# Patient Record
Sex: FEMALE | Race: WHITE | NOT HISPANIC OR LATINO | Employment: FULL TIME | URBAN - METROPOLITAN AREA
[De-identification: names, ages, dates, MRNs, and addresses within clinical notes are randomized per-mention and may not be internally consistent; named-entity substitution may affect disease eponyms.]

---

## 2017-01-01 ENCOUNTER — TELEPHONE (OUTPATIENT)
Dept: NURSING | Facility: CLINIC | Age: 37
End: 2017-01-01

## 2017-01-02 NOTE — TELEPHONE ENCOUNTER
"Call Type: Triage Call    Presenting Problem: 'I have taken the last lopez of a medrol dose pack  for back pain and today I just felt very anxious and my heart was  pounding.  Can I expect this to go on?\"  Advised that  steroid of  ten cause  increase in previous anxiety, mood diaorders per  [employer];  advised if symptoms persist see PCP; call if any new or  worsening symptoms.  Triage Note:  Guideline Title: Anxiety: Panic  Recommended Disposition: See Provider within 2 Weeks  Original Inclination: Would have called clinic  Override Disposition:  Intended Action: Follow Selfcare / Homecare  Physician Contacted: No  All other situations ?  YES  Unable to stand due to faintness, dizziness, or lightheadedness ? NO  History of panic attacks AND current episode NOT resolved ? NO  Experiencing multiple daily episodes of severe anxiety or panic ? NO  Panic attack lasts longer than 30 minutes ? NO  Frequent episodes of panic (several times per week) ? NO  Anxiety symptoms OR panic episodes increasing in frequency or length ? NO  Frequent or longer crying spells and/or despondent about symptoms ? NO  Symptoms associated with behavioral rituals ? NO  Occasional episodes of unexplained panic ? NO  Limited or no support system available ? NO  Describing symptoms of depression ? NO  Recent or new change in symptom pattern ? NO  Discomfort being in public ? NO  Disturbance in sleep patterns ? NO  Severe breathing problems ? NO  Unable to care for self or others or perform activities of daily living (ADLs) ? NO  Hearing voices that no one else hears or seeing things that no one else sees ? NO  Recurring or worsening symptoms AND history of psychiatric illness ? NO  Known or suspected current alcohol or drug use ? NO  Recent loss (through death, divorce, relocation, job loss or change, financial  issues, change in health status, etc.) ? NO  New or increasing symptoms AND not currently in treatment; not taking  medications/therapy; " or change in medication or therapy ? NO  New increase or worsening of ideas or beliefs that most other people find strange  ? NO  Dizziness, faintness, or lightheadedness lasting more than 15 minutes ? NO  Previously evaluated and worsening symptoms interfering with ability to carry out  activities of daily living (ADLs) ? NO  New or worsening confusion, disorientation, or agitation ? NO  Any other cardiac signs/symptoms for more than 5 minutes, now or within last hour.  Pain is NOT associated with taking a deep breath or a productive cough, movement,  or touch to a localized area on the chest or upper body. ? NO  Suspicious without justification; has doubts about loyalty or trust, often avoids  responsibility and blames others ? NO  Any homicidal/destructive ideation, any suicidal ideation, any history of suicide  attempts, and/or any history of self destructive behavior ? NO  Frequent periods of apprehension or excessive worry that are difficult to control  ? NO  Unintentional weight loss of 5% or more of usual body weight in less than 1 month  with or without other signs and symptoms ? NO  New or increasing symptoms AND taking medications/following therapy as prescribed  ? NO  Any suicidal or homicidal attempt(s) or gesture(s) in progress. ? NO  Physician Instructions:  Care Advice: Call provider immediately if behavior becomes a threat to self  or others.

## 2017-01-04 ENCOUNTER — THERAPY VISIT (OUTPATIENT)
Dept: PHYSICAL THERAPY | Facility: CLINIC | Age: 37
End: 2017-01-04
Payer: COMMERCIAL

## 2017-01-04 DIAGNOSIS — G89.29 CHRONIC MIDLINE LOW BACK PAIN WITHOUT SCIATICA: Primary | ICD-10-CM

## 2017-01-04 DIAGNOSIS — M54.50 CHRONIC MIDLINE LOW BACK PAIN WITHOUT SCIATICA: Primary | ICD-10-CM

## 2017-01-04 PROCEDURE — 97110 THERAPEUTIC EXERCISES: CPT | Mod: GP | Performed by: PHYSICAL THERAPIST

## 2017-01-04 PROCEDURE — 97140 MANUAL THERAPY 1/> REGIONS: CPT | Mod: GP | Performed by: PHYSICAL THERAPIST

## 2017-01-18 ENCOUNTER — THERAPY VISIT (OUTPATIENT)
Dept: PHYSICAL THERAPY | Facility: CLINIC | Age: 37
End: 2017-01-18
Payer: COMMERCIAL

## 2017-01-18 DIAGNOSIS — M54.50 CHRONIC MIDLINE LOW BACK PAIN WITHOUT SCIATICA: Primary | ICD-10-CM

## 2017-01-18 DIAGNOSIS — G89.29 CHRONIC MIDLINE LOW BACK PAIN WITHOUT SCIATICA: Primary | ICD-10-CM

## 2017-01-18 PROCEDURE — 97110 THERAPEUTIC EXERCISES: CPT | Mod: GP | Performed by: PHYSICAL THERAPIST

## 2017-01-18 PROCEDURE — 97140 MANUAL THERAPY 1/> REGIONS: CPT | Mod: GP | Performed by: PHYSICAL THERAPIST

## 2017-01-20 NOTE — PROGRESS NOTES
Subjective:    HPI                    Objective:    System    Physical Exam    General     ROS    Assessment/Plan:      PROGRESS  REPORT    Progress reporting period is from IE to 1/18/2017.     SUBJECTIVE  Subjective: Doing better but still w difficulty bending- not wanting to lift   Current Pain level: 3/10            ;   ,     The objective findings are from DOS 1/18/2017.    OBJECTIVE  Objective: Lateral shift present, Rep SGIS inc ext, flexion worse.  FlSS dec ext, inc flexion.  Trial FISS x 2-3 days. Next focus on mid range reduction (SGIS w flexion bias) w core stab due to inconsistency with reductive motion.  Overall pain is improved, however requires sself limiting functional activitites such as bending or lifting.  Neuro signs stable.      ASSESSMENT/PLAN  Updated problem list and treatment plan: Diagnosis 1:  LBP w sciatica  Pain -  manual therapy, splint/taping/bracing/orthotics, self management, education, directional preference exercise and home program  Decreased ROM/flexibility - manual therapy and therapeutic exercise  Decreased joint mobility - manual therapy and therapeutic exercise  Decreased strength - therapeutic exercise and therapeutic activities  Decreased proprioception - neuro re-education and therapeutic activities  Impaired muscle performance - neuro re-education  Decreased function - therapeutic activities  STG/LTGs have been met or progress has been made towards goals:  Yes, but exacerbations have slowed progress  Assessment of Progress: The patient's condition has potential to improve.  Patient is meeting short term goals and is progressing towards long term goals.  Self Management Plans:  Patient has been instructed in a home treatment program.  Patient  has been instructed in self management of symptoms.  I have re-evaluated this patient and find that the nature, scope, duration and intensity of the therapy is appropriate for the medical condition of the patient.  Марина continues to  require the following intervention to meet STG and LTG's: PT      Recommendations:  This patient would benefit from continued therapy.     Frequency:  1 X week, once daily  Duration:  for 6 weeks        Please refer to the daily flowsheet for treatment today, total treatment time and time spent performing 1:1 timed codes.

## 2017-01-31 ENCOUNTER — THERAPY VISIT (OUTPATIENT)
Dept: PHYSICAL THERAPY | Facility: CLINIC | Age: 37
End: 2017-01-31
Payer: COMMERCIAL

## 2017-01-31 DIAGNOSIS — M54.50 CHRONIC MIDLINE LOW BACK PAIN WITHOUT SCIATICA: Primary | ICD-10-CM

## 2017-01-31 DIAGNOSIS — G89.29 CHRONIC MIDLINE LOW BACK PAIN WITHOUT SCIATICA: Primary | ICD-10-CM

## 2017-01-31 PROCEDURE — 97110 THERAPEUTIC EXERCISES: CPT | Mod: GP | Performed by: PHYSICAL THERAPIST

## 2017-01-31 PROCEDURE — 97112 NEUROMUSCULAR REEDUCATION: CPT | Mod: GP | Performed by: PHYSICAL THERAPIST

## 2017-02-12 ENCOUNTER — OFFICE VISIT (OUTPATIENT)
Dept: URGENT CARE | Facility: URGENT CARE | Age: 37
End: 2017-02-12
Payer: COMMERCIAL

## 2017-02-12 VITALS
DIASTOLIC BLOOD PRESSURE: 70 MMHG | WEIGHT: 187 LBS | OXYGEN SATURATION: 98 % | HEART RATE: 75 BPM | SYSTOLIC BLOOD PRESSURE: 120 MMHG | TEMPERATURE: 98.2 F | BODY MASS INDEX: 27.62 KG/M2

## 2017-02-12 DIAGNOSIS — H60.502 ACUTE OTITIS EXTERNA OF LEFT EAR, UNSPECIFIED TYPE: Primary | ICD-10-CM

## 2017-02-12 DIAGNOSIS — J01.90 ACUTE SINUSITIS WITH COEXISTING CONDITION REQUIRING PROPHYLACTIC TREATMENT: ICD-10-CM

## 2017-02-12 DIAGNOSIS — H10.33 ACUTE BACTERIAL CONJUNCTIVITIS OF BOTH EYES: ICD-10-CM

## 2017-02-12 PROCEDURE — 99213 OFFICE O/P EST LOW 20 MIN: CPT | Performed by: PHYSICIAN ASSISTANT

## 2017-02-12 RX ORDER — TOBRAMYCIN 3 MG/ML
1 SOLUTION/ DROPS OPHTHALMIC EVERY 4 HOURS
Qty: 1 BOTTLE | Refills: 0 | Status: SHIPPED | OUTPATIENT
Start: 2017-02-12 | End: 2017-02-19

## 2017-02-12 RX ORDER — CIPROFLOXACIN AND DEXAMETHASONE 3; 1 MG/ML; MG/ML
4 SUSPENSION/ DROPS AURICULAR (OTIC) 2 TIMES DAILY
Qty: 7.5 ML | Refills: 0 | Status: SHIPPED | OUTPATIENT
Start: 2017-02-12 | End: 2017-02-19

## 2017-02-12 RX ORDER — AZITHROMYCIN 250 MG/1
TABLET, FILM COATED ORAL
Qty: 6 TABLET | Refills: 0 | Status: SHIPPED | OUTPATIENT
Start: 2017-02-12 | End: 2017-04-11

## 2017-02-12 NOTE — NURSING NOTE
"Chief Complaint   Patient presents with     Ear Problem     lt ear pain for 4-5 days       Initial /70 (BP Location: Left arm, Patient Position: Chair, Cuff Size: Adult Regular)  Pulse 75  Temp 98.2  F (36.8  C) (Oral)  Wt 187 lb (84.8 kg)  SpO2 98%  BMI 27.62 kg/m2 Estimated body mass index is 27.62 kg/(m^2) as calculated from the following:    Height as of 12/23/16: 5' 9\" (1.753 m).    Weight as of this encounter: 187 lb (84.8 kg).  Medication Reconciliation: complete    "

## 2017-02-12 NOTE — MR AVS SNAPSHOT
After Visit Summary   2/12/2017    Марина Whitehead    MRN: [id number]           Patient Information     Date Of Birth          1980        Visit Information        Provider Department      2/12/2017 11:45 AM Tony Lee PA-C [facility]        Today's Diagnoses     Acute otitis externa of left ear, unspecified type    -  1    Acute sinusitis with coexisting condition requiring prophylactic treatment        Acute bacterial conjunctivitis of both eyes           Follow-ups after your visit        Your next 10 appointments already scheduled     Feb 20, 2017 10:20 AM CST   [facility] with Terri Ferrell, PT   [place] [facility] ([facility]  )    [address]  [place] [address] [phone]              Who to contact     If you have questions or need follow up information about today's clinic visit or your schedule please contact [facility] directly at [phone].  Normal or non-critical lab and imaging results will be communicated to you by Chalkflyhart, letter or phone within 4 business days after the clinic has received the results. If you do not hear from us within 7 days, please contact the clinic through RegaloCardt or phone. If you have a critical or abnormal lab result, we will notify you by phone as soon as possible.  Submit refill requests through [employer] or call your pharmacy and they will forward the refill request to us. Please allow 3 business days for your refill to be completed.          Additional Information About Your Visit        MyChart Information     [employer] gives you secure access to your electronic health record. If you see a primary care provider, you can also send messages to your care team and make appointments. If you have questions, please call your primary care clinic.  If you do not have a primary care provider, please call [phone] and they will assist you.         Care EveryWhere ID     This is your Care EveryWhere ID. This could be used by other organizations to access your [facility] medical records  [id number]        Your Vitals Were     Pulse Temperature Pulse Oximetry BMI (Body Mass Index)          75 98.2  F (36.8  C) (Oral) 98% 27.62 kg/m2         Blood Pressure from Last 3 Encounters:   02/17/17 121/87   02/12/17 120/70   12/23/16 104/70    Weight from Last 3 Encounters:   02/17/17 175 lb (79.4 kg)   02/12/17 187 lb (84.8 kg)   12/23/16 182 lb 8 oz (82.8 kg)              Today, you had the following     No orders found for display         Today's Medication Changes          These changes are accurate as of: 2/12/17 11:59 PM.  If you have any questions, ask your nurse or doctor.               Start taking these medicines.        Dose/Directions    azithromycin 250 MG tablet   Commonly known as:  ZITHROMAX   Used for:  Acute otitis externa of left ear, unspecified type, Acute sinusitis with coexisting condition requiring prophylactic treatment        2 tabs po qd day 1, then 1 tab po qd days 2-5   Quantity:  6 tablet   Refills:  0       ciprofloxacin-dexamethasone otic suspension   Commonly known as:  CIPRODEX   Used for:  Acute sinusitis with coexisting condition requiring prophylactic treatment        Dose:  4 drop   Place 4 drops Into the left ear 2 times daily for 7 days   Quantity:  7.5 mL   Refills:  0       tobramycin 0.3 % ophthalmic solution   Commonly known as:  TOBREX   Used for:  Acute bacterial conjunctivitis of both eyes        Dose:  1 drop   Apply 1 drop to eye every 4 hours for 7 days   Quantity:  1 Bottle   Refills:  0            Where to get your medicines      These medications were sent to [id number] - [place], [place] - [address] AT [facility] [address]  [address], [place] [address]     Phone:  [phone]     azithromycin 250 MG tablet    ciprofloxacin-dexamethasone otic suspension    tobramycin 0.3 % ophthalmic  solution                Primary Care Provider Office Phone # Fax #    DAVIN Armstrong [phone]       [place] [address]  [place] [address]        Thank you!     Thank you for choosing [facility]  for your care. Our goal is always to provide you with excellent care. Hearing back from our patients is one way we can continue to improve our services. Please take a few minutes to complete the written survey that you may receive in the mail after your visit with us. Thank you!             Your Updated Medication List - Protect others around you: Learn how to safely use, store and throw away your medicines at www.disposemymeds.org.          This list is accurate as of: 2/12/17 11:59 PM.  Always use your most recent med list.                   Brand Name Dispense Instructions for use    azithromycin 250 MG tablet    ZITHROMAX    6 tablet    2 tabs po qd day 1, then 1 tab po qd days 2-5       ciprofloxacin-dexamethasone otic suspension    CIPRODEX    7.5 mL    Place 4 drops Into the left ear 2 times daily for 7 days       fluticasone 50 MCG/ACT spray    FLONASE     Spray 1 spray into both nostrils daily Reported on 2/17/2017       methylPREDNISolone 4 MG tablet    MEDROL DOSEPAK    21 tablet    Follow package instructions       sertraline 50 MG tablet    ZOLOFT    90 tablet    Take 1 tablet (50 mg) by mouth daily       tobramycin 0.3 % ophthalmic solution    TOBREX    1 Bottle    Apply 1 drop to eye every 4 hours for 7 days

## 2017-02-13 ENCOUNTER — TELEPHONE (OUTPATIENT)
Dept: FAMILY MEDICINE | Facility: CLINIC | Age: 37
End: 2017-02-13

## 2017-02-13 NOTE — TELEPHONE ENCOUNTER
Pt called in d/t she had scalp sensitivity this am when brushing her hair. Pt was seen yesterday for outter ear infection, rx'd drops, oral abx and eye drops.  Reassurance given and will monitor for worsening or no improvement and call back if worsening.   Pt in agreement with plan and verbalized understanding.  Thanks!  GABRIEL Cote  Triage Nurse

## 2017-02-17 ENCOUNTER — OFFICE VISIT (OUTPATIENT)
Dept: URGENT CARE | Facility: URGENT CARE | Age: 37
End: 2017-02-17
Payer: COMMERCIAL

## 2017-02-17 VITALS
HEART RATE: 85 BPM | TEMPERATURE: 97.8 F | HEIGHT: 69 IN | SYSTOLIC BLOOD PRESSURE: 121 MMHG | BODY MASS INDEX: 25.92 KG/M2 | DIASTOLIC BLOOD PRESSURE: 87 MMHG | OXYGEN SATURATION: 97 % | WEIGHT: 175 LBS

## 2017-02-17 DIAGNOSIS — J01.90 ACUTE SINUSITIS WITH SYMPTOMS > 10 DAYS: ICD-10-CM

## 2017-02-17 DIAGNOSIS — R07.0 THROAT PAIN: Primary | ICD-10-CM

## 2017-02-17 LAB
DEPRECATED S PYO AG THROAT QL EIA: NORMAL
MICRO REPORT STATUS: NORMAL
SPECIMEN SOURCE: NORMAL

## 2017-02-17 PROCEDURE — 99213 OFFICE O/P EST LOW 20 MIN: CPT | Performed by: INTERNAL MEDICINE

## 2017-02-17 PROCEDURE — 87880 STREP A ASSAY W/OPTIC: CPT | Performed by: FAMILY MEDICINE

## 2017-02-17 PROCEDURE — 87081 CULTURE SCREEN ONLY: CPT | Performed by: FAMILY MEDICINE

## 2017-02-17 RX ORDER — LEVOFLOXACIN 500 MG/1
500 TABLET, FILM COATED ORAL DAILY
Qty: 10 TABLET | Refills: 0 | Status: SHIPPED | OUTPATIENT
Start: 2017-02-17 | End: 2017-04-11

## 2017-02-17 NOTE — MR AVS SNAPSHOT
After Visit Summary   2/17/2017    Марина Whitehead    MRN: [id number]           Patient Information     Date Of Birth          1980        Visit Information        Provider Department      2/17/2017 8:15 PM Siobhan Muñoz MD [facility]        Today's Diagnoses     Throat pain    -  1    Acute sinusitis with symptoms > 10 days          Care Instructions    Fluids  Humidifier  nasacort  No antihistamines  Nasal saline  antibiotics   Probiotics  Watch for fever  Recheck with primary next week or end of course.  Call or return to clinic if symptoms worsen or fail to improve as anticipated.          Follow-ups after your visit        Your next 10 appointments already scheduled     Feb 20, 2017 10:20 AM CST   [facility] with Terri Ferrell PT   [place] [facility] ([facility] [place]  )    [address] [place] [address] [address]  [facility] [address] [phone]              Who to contact     If you have questions or need follow up information about today's clinic visit or your schedule please contact [facility] directly at [phone].  Normal or non-critical lab and imaging results will be communicated to you by AlignMedhart, letter or phone within 4 business days after the clinic has received the results. If you do not hear from us within 7 days, please contact the clinic through AlignMedhart or phone. If you have a critical or abnormal lab result, we will notify you by phone as soon as possible.  Submit refill requests through [employer] or call your pharmacy and they will forward the refill request to us. Please allow 3 business days for your refill to be completed.          Additional Information About Your Visit        MyChart Information     [employer] gives you secure access to your electronic health record. If you see a primary care provider, you can also send messages to your care team and make appointments. If you have questions,  "please call your primary care clinic.  If you do not have a primary care provider, please call [phone] and they will assist you.        Care EveryWhere ID     This is your Care EveryWhere ID. This could be used by other organizations to access your [facility] medical records  [id number]        Your Vitals Were     Pulse Temperature Height Last Period Pulse Oximetry Breastfeeding?    85 97.8  F (36.6  C) (Tympanic) 5' 9\" (1.753 m) 02/07/2017 97% No    BMI (Body Mass Index)                   25.84 kg/m2            Blood Pressure from Last 3 Encounters:   02/17/17 121/87   02/12/17 120/70   12/23/16 104/70    Weight from Last 3 Encounters:   02/17/17 175 lb (79.4 kg)   02/12/17 187 lb (84.8 kg)   12/23/16 182 lb 8 oz (82.8 kg)              We Performed the Following     Beta strep group A culture     Strep, Rapid Screen          Today's Medication Changes          These changes are accurate as of: 2/17/17  9:31 PM.  If you have any questions, ask your nurse or doctor.               Start taking these medicines.        Dose/Directions    levofloxacin 500 MG tablet   Commonly known as:  LEVAQUIN   Used for:  Acute sinusitis with symptoms > 10 days   Started by:  Siobhan Muñoz MD        Dose:  500 mg   Take 1 tablet (500 mg) by mouth daily   Quantity:  10 tablet   Refills:  0            Where to get your medicines      These medications were sent to "[id number] - [place], [place] - [address] AT [facility] [address]  [address] [place] [address]    Hours:  24-hours Phone:  [phone]     levofloxacin 500 MG tablet                Primary Care Provider Office Phone # Fax #    DAVIN Armstrong [facility] [phone]       [facility] [address] [place] [address]        Thank you!     Thank you for choosing [facility]  for your care. Our goal is always to provide you with excellent care. Hearing back " from our patients is one way we can continue to improve our services. Please take a few minutes to complete the written survey that you may receive in the mail after your visit with us. Thank you!             Your Updated Medication List - Protect others around you: Learn how to safely use, store and throw away your medicines at www.disposemymeds.org.          This list is accurate as of: 2/17/17  9:31 PM.  Always use your most recent med list.                   Brand Name Dispense Instructions for use    azithromycin 250 MG tablet    ZITHROMAX    6 tablet    2 tabs po qd day 1, then 1 tab po qd days 2-5       ciprofloxacin-dexamethasone otic suspension    CIPRODEX    7.5 mL    Place 4 drops Into the left ear 2 times daily for 7 days       fluticasone 50 MCG/ACT spray    FLONASE     Spray 1 spray into both nostrils daily Reported on 2/17/2017       levofloxacin 500 MG tablet    LEVAQUIN    10 tablet    Take 1 tablet (500 mg) by mouth daily       methylPREDNISolone 4 MG tablet    MEDROL DOSEPAK    21 tablet    Follow package instructions       sertraline 50 MG tablet    ZOLOFT    90 tablet    Take 1 tablet (50 mg) by mouth daily       tobramycin 0.3 % ophthalmic solution    TOBREX    1 Bottle    Apply 1 drop to eye every 4 hours for 7 days

## 2017-02-18 NOTE — PROGRESS NOTES
"SUBJECTIVE:   Марина Whitehead is a 37 year old female presenting with a chief complaint   Chief Complaint   Patient presents with     Urgent Care     Pharyngitis     c/o sore throat,ear pain and sinus infection for 5 days     Seen 2/12 UC & dx sinus/otitis externa & pink eye.  tx zpak.    [place] better for few days but then symptoms sinus headache returned  Onset of symptoms was few weeks    Past Medical History   Diagnosis Date     NO ACTIVE PROBLEMS      Current Outpatient Prescriptions   Medication Sig Dispense Refill     levofloxacin (LEVAQUIN) 500 MG tablet Take 1 tablet (500 mg) by mouth daily 10 tablet 0     ciprofloxacin-dexamethasone (CIPRODEX) otic suspension Place 4 drops Into the left ear 2 times daily for 7 days 7.5 mL 0     azithromycin (ZITHROMAX) 250 MG tablet 2 tabs po qd day 1, then 1 tab po qd days 2-5 (Patient not taking: Reported on 2/17/2017) 6 tablet 0     tobramycin (TOBREX) 0.3 % ophthalmic solution Apply 1 drop to eye every 4 hours for 7 days (Patient not taking: Reported on 2/17/2017) 1 Bottle 0     fluticasone (FLONASE) 50 MCG/ACT spray Spray 1 spray into both nostrils daily Reported on 2/17/2017       methylPREDNISolone (MEDROL DOSEPAK) 4 MG tablet Follow package instructions (Patient not taking: Reported on 2/12/2017) 21 tablet 0     sertraline (ZOLOFT) 50 MG tablet Take 1 tablet (50 mg) by mouth daily (Patient not taking: Reported on 2/17/2017) 90 tablet 3     Social History   Substance Use Topics     Smoking status: Never Smoker     Smokeless tobacco: Never Used     Alcohol use 1.2 oz/week     2 Standard drinks or equivalent per week       OBJECTIVE  :/87 (BP Location: Left arm, Patient Position: Chair, Cuff Size: Adult Regular)  Pulse 85  Temp 97.8  F (36.6  C) (Tympanic)  Ht 5' 9\" (1.753 m)  Wt 175 lb (79.4 kg)  LMP 02/07/2017  SpO2 97%  Breastfeeding? No  BMI 25.84 kg/m2  GENERAL APPEARANCE: healthy, alert and no distress  HENT: TM's normal bilaterally, oral mucous membranes " moist, no erythema noted and frontal sinus tenderness   NECK: supple, nontender, no lymphadenopathy  RESP: lungs clear to auscultation - no rales, rhonchi or wheezes  CV: regular rates and rhythm, normal S1 S2, no murmur noted    ASSESSMENT:    ICD-10-CM    1. Throat pain R07.0 Strep, Rapid Screen   2. Acute sinusitis with symptoms > 10 days J01.90 levofloxacin (LEVAQUIN) 500 MG tablet     Patient Instructions   Fluids  Humidifier  nasacort  No antihistamines  Nasal saline  antibiotics   Probiotics  Watch for fever  Recheck with primary next week or end of course.  Call or return to clinic if symptoms worsen or fail to improve as anticipated.      rapid strep test negative

## 2017-02-18 NOTE — NURSING NOTE
"Chief Complaint   Patient presents with     Urgent Care     Pharyngitis     c/o sore throat,ear pain and sinus infection for 5 days       Initial /87 (BP Location: Left arm, Patient Position: Chair, Cuff Size: Adult Regular)  Pulse 85  Temp 97.8  F (36.6  C) (Tympanic)  Ht 5' 9\" (1.753 m)  Wt 175 lb (79.4 kg)  LMP 02/07/2017  SpO2 97%  Breastfeeding? No  BMI 25.84 kg/m2 Estimated body mass index is 25.84 kg/(m^2) as calculated from the following:    Height as of this encounter: 5' 9\" (1.753 m).    Weight as of this encounter: 175 lb (79.4 kg).  Medication Reconciliation: complete   Ksenia Cullen MA    "

## 2017-02-18 NOTE — PATIENT INSTRUCTIONS
Fluids  Humidifier  nasacort  No antihistamines  Nasal saline  antibiotics   Probiotics  Watch for fever  Recheck with primary next week or end of course.  Call or return to clinic if symptoms worsen or fail to improve as anticipated.

## 2017-02-19 LAB
BACTERIA SPEC CULT: NORMAL
MICRO REPORT STATUS: NORMAL
SPECIMEN SOURCE: NORMAL

## 2017-02-20 ENCOUNTER — THERAPY VISIT (OUTPATIENT)
Dept: PHYSICAL THERAPY | Facility: CLINIC | Age: 37
End: 2017-02-20
Payer: COMMERCIAL

## 2017-02-20 DIAGNOSIS — G89.29 CHRONIC MIDLINE LOW BACK PAIN WITHOUT SCIATICA: ICD-10-CM

## 2017-02-20 DIAGNOSIS — M54.50 CHRONIC MIDLINE LOW BACK PAIN WITHOUT SCIATICA: ICD-10-CM

## 2017-02-20 PROCEDURE — 97110 THERAPEUTIC EXERCISES: CPT | Mod: GP | Performed by: PHYSICAL THERAPIST

## 2017-02-20 NOTE — PROGRESS NOTES
SUBJECTIVE:  Марина Whitehead is a 37 year old female who presents with left ear pain for 2 day(s).   Severity: mild and moderate   Timing:still present  Additional symptoms include congestion.      History of recurrent otitis: no    Past Medical History   Diagnosis Date     NO ACTIVE PROBLEMS      Patient Active Problem List   Diagnosis     Lumbago     Degenerative arthritis of lumbar spine     Chronic SI joint pain     Obesity     Chronic midline low back pain without sciatica     Generalized anxiety disorder     Major depressive disorder, single episode, mild (H)     Irritable bowel syndrome with diarrhea     Family history of polycythemia vera     Social History   Substance Use Topics     Smoking status: Never Smoker     Smokeless tobacco: Never Used     Alcohol use 1.2 oz/week     2 Standard drinks or equivalent per week       ROS:   CONSTITUTIONAL:NEGATIVE for fever, chills, change in weight  INTEGUMENTARY/SKIN: NEGATIVE for worrisome rashes, moles or lesions  ENT/MOUTH: POSITIVE for ear pain  RESP:NEGATIVE for significant cough or SOB  CV: NEGATIVE for chest pain, palpitations or peripheral edema  GI: NEGATIVE for nausea, abdominal pain, heartburn, or change in bowel habits  MUSCULOSKELETAL: NEGATIVE for significant arthralgias or myalgia  NEURO: NEGATIVE for weakness, dizziness or paresthesias    OBJECTIVE:  /70 (BP Location: Left arm, Patient Position: Chair, Cuff Size: Adult Regular)  Pulse 75  Temp 98.2  F (36.8  C) (Oral)  Wt 187 lb (84.8 kg)  SpO2 98%  BMI 27.62 kg/m2   EXAM:  The right TM is normal: no effusions, no erythema, and normal landmarks     The right auditory canal is swollen and tender  The left TM is normal: no effusions, no erythema, and normal landmarks  The left auditory canal is erythematous  Oropharynx exam is normal: no lesions, erythema, adenopathy or exudate.  GENERAL: no acute distress  EYES: conjunctiva/corneas- conjunctival injection OU and yellow colored discharge present  bilateral  NECK: supple, non-tender to palpation, no adenopathy noted  RESP: lungs clear to auscultation - no rales, rhonchi or wheezes  CV: regular rates and rhythm, normal S1 S2, no murmur noted  SKIN: no suspicious lesions or rashes     ASSESSMENT/PLAN:      ICD-10-CM    1. Acute otitis externa of left ear, unspecified type H60.502 azithromycin (ZITHROMAX) 250 MG tablet   2. Acute sinusitis with coexisting condition requiring prophylactic treatment J01.90 azithromycin (ZITHROMAX) 250 MG tablet     ciprofloxacin-dexamethasone (CIPRODEX) otic suspension   3. Acute bacterial conjunctivitis of both eyes H10.33 tobramycin (TOBREX) 0.3 % ophthalmic solution       Wash hands  Follow up as needed  See orders in Epic

## 2017-03-08 ENCOUNTER — MYC MEDICAL ADVICE (OUTPATIENT)
Dept: FAMILY MEDICINE | Facility: CLINIC | Age: 37
End: 2017-03-08

## 2017-03-08 DIAGNOSIS — B37.31 YEAST INFECTION OF THE VAGINA: Primary | ICD-10-CM

## 2017-03-08 RX ORDER — FLUCONAZOLE 150 MG/1
150 TABLET ORAL ONCE
Qty: 1 TABLET | Refills: 0 | Status: SHIPPED | OUTPATIENT
Start: 2017-03-08 | End: 2017-03-08

## 2017-04-11 ENCOUNTER — OFFICE VISIT (OUTPATIENT)
Dept: FAMILY MEDICINE | Facility: CLINIC | Age: 37
End: 2017-04-11
Payer: COMMERCIAL

## 2017-04-11 VITALS
HEIGHT: 69 IN | BODY MASS INDEX: 26.66 KG/M2 | HEART RATE: 76 BPM | TEMPERATURE: 99.1 F | DIASTOLIC BLOOD PRESSURE: 80 MMHG | SYSTOLIC BLOOD PRESSURE: 131 MMHG | OXYGEN SATURATION: 97 % | WEIGHT: 180 LBS

## 2017-04-11 DIAGNOSIS — J32.9 FREQUENT EPISODES OF SINUSITIS: Primary | ICD-10-CM

## 2017-04-11 DIAGNOSIS — F32.0 MAJOR DEPRESSIVE DISORDER, SINGLE EPISODE, MILD (H): ICD-10-CM

## 2017-04-11 PROCEDURE — 99213 OFFICE O/P EST LOW 20 MIN: CPT | Performed by: NURSE PRACTITIONER

## 2017-04-11 RX ORDER — DOXYCYCLINE 100 MG/1
100 CAPSULE ORAL 2 TIMES DAILY
Qty: 20 CAPSULE | Refills: 0 | Status: SHIPPED | OUTPATIENT
Start: 2017-04-11 | End: 2017-12-01

## 2017-04-11 ASSESSMENT — ANXIETY QUESTIONNAIRES
2. NOT BEING ABLE TO STOP OR CONTROL WORRYING: NOT AT ALL
5. BEING SO RESTLESS THAT IT IS HARD TO SIT STILL: SEVERAL DAYS
3. WORRYING TOO MUCH ABOUT DIFFERENT THINGS: NOT AT ALL
7. FEELING AFRAID AS IF SOMETHING AWFUL MIGHT HAPPEN: NOT AT ALL
1. FEELING NERVOUS, ANXIOUS, OR ON EDGE: SEVERAL DAYS
GAD7 TOTAL SCORE: 4
6. BECOMING EASILY ANNOYED OR IRRITABLE: SEVERAL DAYS

## 2017-04-11 ASSESSMENT — PATIENT HEALTH QUESTIONNAIRE - PHQ9: 5. POOR APPETITE OR OVEREATING: SEVERAL DAYS

## 2017-04-11 NOTE — PATIENT INSTRUCTIONS
For your sinus symptoms, continue your nasacort for now.  Follow up with ENT at your earliest convenience.  Take the doxycycline if your symptoms again worsen.  Follow up with me as needed.    Depression:  Continue your zoloft as prescribed.  Let me know if your symptoms worsen or change and follow up with me every 6-12 months.

## 2017-04-11 NOTE — NURSING NOTE
"Chief Complaint   Patient presents with     Sinus Problem       Initial /80  Pulse 76  Temp 99.1  F (37.3  C) (Oral)  Ht 5' 9\" (1.753 m)  Wt 180 lb (81.6 kg)  SpO2 97%  BMI 26.58 kg/m2 Estimated body mass index is 26.58 kg/(m^2) as calculated from the following:    Height as of this encounter: 5' 9\" (1.753 m).    Weight as of this encounter: 180 lb (81.6 kg).  Medication Reconciliation: complete         Carlos Kimble MA       "

## 2017-04-11 NOTE — MR AVS SNAPSHOT
After Visit Summary   4/11/2017    Марина Whitehead    MRN: [id number]           Patient Information     Date Of Birth          1980        Visit Information        Provider Department      4/11/2017 1:20 PM Heydi Butler APRN CNP [facility]        Today's Diagnoses     Frequent episodes of sinusitis    -  1    Major depressive disorder, single episode, mild (H)          Care Instructions    For your sinus symptoms, continue your nasacort for now.  Follow up with ENT at your earliest convenience.  Take the doxycycline if your symptoms again worsen.  Follow up with me as needed.    Depression:  Continue your zoloft as prescribed.  Let me know if your symptoms worsen or change and follow up with me every 6-12 months.          Follow-ups after your visit        Additional Services     OTOLARYNGOLOGY REFERRAL       Your provider has referred you to: [facility] - [place] [phone]  N: [facility] [place] [phone]   N: [facility] [place] [facility] [place] [phone]   [place] [phone]  [facility] [phone]     Please be aware that coverage of these services is subject to the terms and limitations of your health insurance plan.  Call member services at your health plan with any benefit or coverage questions.      Please bring the following with you to your appointment:    (1) Any X-Rays, CTs or MRIs which have been performed.  Contact the facility where they were done to arrange for  prior to your scheduled appointment.   (2) List of current medications  (3) This referral request   (4) Any documents/labs given to you for this referral                  Who to contact     If you have questions or need follow up information about today's clinic visit or your schedule please contact [facility] directly at  "[phone].  Normal or non-critical lab and imaging results will be communicated to you by MyChart, letter or phone within 4 business days after the clinic has received the results. If you do not hear from us within 7 days, please contact the clinic through [employer] or phone. If you have a critical or abnormal lab result, we will notify you by phone as soon as possible.  Submit refill requests through [employer] or call your pharmacy and they will forward the refill request to us. Please allow 3 business days for your refill to be completed.          Additional Information About Your Visit        [employer] Information     [employer] gives you secure access to your electronic health record. If you see a primary care provider, you can also send messages to your care team and make appointments. If you have questions, please call your primary care clinic.  If you do not have a primary care provider, please call [phone] and they will assist you.        Care EveryWhere ID     This is your Care EveryWhere ID. This could be used by other organizations to access your [facility] medical records  [id number]        Your Vitals Were     Pulse Temperature Height Pulse Oximetry BMI (Body Mass Index)       76 99.1  F (37.3  C) (Oral) 5' 9\" (1.753 m) 97% 26.58 kg/m2        Blood Pressure from Last 3 Encounters:   04/11/17 131/80   02/17/17 121/87   02/12/17 120/70    Weight from Last 3 Encounters:   04/11/17 180 lb (81.6 kg)   02/17/17 175 lb (79.4 kg)   02/12/17 187 lb (84.8 kg)              We Performed the Following     DEPRESSION ACTION PLAN (DAP)     OTOLARYNGOLOGY REFERRAL          Today's Medication Changes          These changes are accurate as of: 4/11/17  1:39 PM.  If you have any questions, ask your nurse or doctor.               Start taking these medicines.        Dose/Directions    doxycycline 100 MG capsule   Commonly known as:  VIBRAMYCIN   Used for:  Frequent episodes of sinusitis   Started by:  Heydi Butler " S, APRN CNP        Dose:  100 mg   Take 1 capsule (100 mg) by mouth 2 times daily   Quantity:  20 capsule   Refills:  0         Stop taking these medicines if you haven't already. Please contact your care team if you have questions.     fluticasone 50 MCG/ACT spray   Commonly known as:  FLONASE   Stopped by:  Heydi Butler APRN CNP                Where to get your medicines      These medications were sent to [id number] - [place], [place] - [address] AT [facility] [address]  [address], [place] [address]     Phone:  [phone]     doxycycline 100 MG capsule                Primary Care Provider Office Phone # Fax #    DAVIN Armstrong [phone]       [facility] [address] [place] [address]        Thank you!     Thank you for choosing [facility]  for your care. Our goal is always to provide you with excellent care. Hearing back from our patients is one way we can continue to improve our services. Please take a few minutes to complete the written survey that you may receive in the mail after your visit with us. Thank you!             Your Updated Medication List - Protect others around you: Learn how to safely use, store and throw away your medicines at www.disposemymeds.org.          This list is accurate as of: 4/11/17  1:39 PM.  Always use your most recent med list.                   Brand Name Dispense Instructions for use    doxycycline 100 MG capsule    VIBRAMYCIN    20 capsule    Take 1 capsule (100 mg) by mouth 2 times daily       NASACORT AQ NA          sertraline 50 MG tablet    ZOLOFT    90 tablet    Take 1 tablet (50 mg) by mouth daily

## 2017-04-12 ASSESSMENT — ANXIETY QUESTIONNAIRES: GAD7 TOTAL SCORE: 4

## 2017-04-12 ASSESSMENT — PATIENT HEALTH QUESTIONNAIRE - PHQ9: SUM OF ALL RESPONSES TO PHQ QUESTIONS 1-9: 1

## 2017-05-18 ENCOUNTER — MYC REFILL (OUTPATIENT)
Dept: FAMILY MEDICINE | Facility: CLINIC | Age: 37
End: 2017-05-18

## 2017-05-18 ENCOUNTER — MYC MEDICAL ADVICE (OUTPATIENT)
Dept: FAMILY MEDICINE | Facility: CLINIC | Age: 37
End: 2017-05-18

## 2017-05-18 DIAGNOSIS — F41.1 GENERALIZED ANXIETY DISORDER: ICD-10-CM

## 2017-05-18 DIAGNOSIS — F33.1 MAJOR DEPRESSIVE DISORDER, RECURRENT EPISODE, MODERATE (H): ICD-10-CM

## 2017-05-18 DIAGNOSIS — M26.609 TMJ (TEMPOROMANDIBULAR JOINT SYNDROME): Primary | ICD-10-CM

## 2017-05-19 NOTE — TELEPHONE ENCOUNTER
Message from [email or web address]:  Original authorizing provider: DAVIN Fisher CNP would like a refill of the following medications:  sertraline (ZOLOFT) 50 MG tablet [DAVIN Fisher CNP]    Preferred pharmacy: [id number] - [place], [place] - [address] AT [facility] [address]    Comment:

## 2017-05-19 NOTE — TELEPHONE ENCOUNTER
I gave her the [place] referral for the [place] [facility] which does temporomandibular joint care.  Thank you.

## 2017-05-23 ENCOUNTER — OFFICE VISIT (OUTPATIENT)
Dept: FAMILY MEDICINE | Facility: CLINIC | Age: 37
End: 2017-05-23
Payer: COMMERCIAL

## 2017-05-23 VITALS
SYSTOLIC BLOOD PRESSURE: 129 MMHG | WEIGHT: 172 LBS | OXYGEN SATURATION: 98 % | HEART RATE: 74 BPM | BODY MASS INDEX: 25.48 KG/M2 | HEIGHT: 69 IN | TEMPERATURE: 97.8 F | DIASTOLIC BLOOD PRESSURE: 80 MMHG

## 2017-05-23 DIAGNOSIS — F41.1 GENERALIZED ANXIETY DISORDER: Primary | ICD-10-CM

## 2017-05-23 DIAGNOSIS — F32.0 MAJOR DEPRESSIVE DISORDER, SINGLE EPISODE, MILD (H): ICD-10-CM

## 2017-05-23 PROCEDURE — 99213 OFFICE O/P EST LOW 20 MIN: CPT | Performed by: NURSE PRACTITIONER

## 2017-05-23 RX ORDER — BUSPIRONE HYDROCHLORIDE 5 MG/1
TABLET ORAL
Qty: 90 TABLET | Refills: 2 | Status: SHIPPED | OUTPATIENT
Start: 2017-05-23 | End: 2017-08-24

## 2017-05-23 ASSESSMENT — PATIENT HEALTH QUESTIONNAIRE - PHQ9: 5. POOR APPETITE OR OVEREATING: MORE THAN HALF THE DAYS

## 2017-05-23 ASSESSMENT — ANXIETY QUESTIONNAIRES
IF YOU CHECKED OFF ANY PROBLEMS ON THIS QUESTIONNAIRE, HOW DIFFICULT HAVE THESE PROBLEMS MADE IT FOR YOU TO DO YOUR WORK, TAKE CARE OF THINGS AT HOME, OR GET ALONG WITH OTHER PEOPLE: SOMEWHAT DIFFICULT
2. NOT BEING ABLE TO STOP OR CONTROL WORRYING: NOT AT ALL
7. FEELING AFRAID AS IF SOMETHING AWFUL MIGHT HAPPEN: SEVERAL DAYS
6. BECOMING EASILY ANNOYED OR IRRITABLE: NEARLY EVERY DAY
3. WORRYING TOO MUCH ABOUT DIFFERENT THINGS: NOT AT ALL
5. BEING SO RESTLESS THAT IT IS HARD TO SIT STILL: SEVERAL DAYS
1. FEELING NERVOUS, ANXIOUS, OR ON EDGE: NEARLY EVERY DAY
GAD7 TOTAL SCORE: 10

## 2017-05-23 ASSESSMENT — ENCOUNTER SYMPTOMS: NERVOUS/ANXIOUS: 1

## 2017-05-23 NOTE — PROGRESS NOTES
"  SUBJECTIVE:                                                    Марина Whitehead is a 37 year old female who presents to clinic today for the following health issues:      Anxiety     History of Present Illness   Depression & Anxiety Follow-up:     Depression/Anxiety:  Depression & Anxiety    Status since last visit::  Worsened    Other associated symptoms of depression and anxiety::  None    Significant life event::  YES    Current substance use::  None    Марина reports that her anxiety has been bad for the past few weeks.  She has had a few panic attacks.  Not currently in therapy.  She is considering a dose adjustment or other medication.      TMJ: diagnosed, left side.  Chewing/clenching.  This is also causing her to have some headaches.  \"I've got to get this under control.\"      Problem list and histories reviewed & adjusted, as indicated.  Additional history: as documented        Patient Active Problem List   Diagnosis     Lumbago     Degenerative arthritis of lumbar spine     Chronic SI joint pain     Obesity     Chronic midline low back pain without sciatica     Generalized anxiety disorder     Major depressive disorder, single episode, mild (H)     Irritable bowel syndrome with diarrhea     Family history of polycythemia vera     Past Surgical History:   Procedure Laterality Date      SECTION  03/19/10       Social History   Substance Use Topics     Smoking status: Never Smoker     Smokeless tobacco: Never Used     Alcohol use 1.2 oz/week     Family History   Problem Relation Age of Onset     Hyperlipidemia Mother      Hyperlipidemia Father      Hypertension Father      Coronary Artery Disease Father      Blood Disease Maternal Grandfather      polycythemia vera     Colon Cancer Paternal Grandmother      HEART DISEASE Paternal Grandfather          Current Outpatient Prescriptions   Medication Sig Dispense Refill     sertraline (ZOLOFT) 50 MG tablet Take 1 tablet (50 mg) by mouth daily 90 tablet 1     " "Triamcinolone Acetonide (NASACORT AQ NA)        doxycycline (VIBRAMYCIN) 100 MG capsule Take 1 capsule (100 mg) by mouth 2 times daily 20 capsule 0     Allergies   Allergen Reactions     Amoxicillin      Hives.     Penicillins      Hives.     No lab results found.   BP Readings from Last 3 Encounters:   05/23/17 129/80   04/11/17 131/80   02/17/17 121/87    Wt Readings from Last 3 Encounters:   05/23/17 172 lb (78 kg)   04/11/17 180 lb (81.6 kg)   02/17/17 175 lb (79.4 kg)                  Labs reviewed in EPIC    ROS:  Constitutional, HEENT, cardiovascular, pulmonary, gi and gu systems are negative, except as otherwise noted.    OBJECTIVE:                                                    /80  Pulse 74  Temp 97.8  F (36.6  C) (Oral)  Ht 5' 9\" (1.753 m)  Wt 172 lb (78 kg)  SpO2 98%  BMI 25.4 kg/m2  Body mass index is 25.4 kg/(m^2).  GENERAL APPEARANCE: healthy, alert and no distress. Smiling.   SKIN: warm and dry  PSYCH: mentation appears normal and affect normal/bright.  Good eye contact.         ASSESSMENT/PLAN:                                                    (F41.1) Generalized anxiety disorder  (primary encounter diagnosis)  Comment: worse  Plan: busPIRone (BUSPAR) 5 MG tablet        I discussed and considered different medication options for Марина.  We considered increasing her Zoloft today.  After further discussion, and after talking more about her family history, she reported that her dad has been on BuSpar and has done quite well.  For today, we agreed that she will go ahead and try BuSpar. She will start slow and work up on the dosing. She will continue her Zoloft at 50 mg a day.  If her symptoms are not dramatically improved with the buspar after 2-4 weeks, I would consider increasing the Zoloft to 75 mg a day.  She will continue her self-cares.  She will consider restarting therapy.  She will return to the clinic or doing a visit with me for follow-up in 2-3 months, sooner if " problems.    (F32.0) Major depressive disorder, single episode, mild (H)  Comment:   Plan: busPIRone (BUSPAR) 5 MG tablet          DAVIN Fernando [facility]

## 2017-05-23 NOTE — MR AVS SNAPSHOT
After Visit Summary   5/23/2017    Марина Whitehead    MRN: [id number]           Patient Information     Date Of Birth          1980        Visit Information        Provider Department      5/23/2017 1:00 PM Heydi Butler APRN CNP [facility]        Today's Diagnoses     Generalized anxiety disorder    -  1    Major depressive disorder, single episode, mild (H)           Follow-ups after your visit        Your next 10 appointments already scheduled     Jun 05, 2017  7:40 AM CDT   [facility] with Terri Ferrell PT   [facility] ([facility] [place]  )    [address]  [place] [address] [phone]              Who to contact     If you have questions or need follow up information about today's clinic visit or your schedule please contact [facility] directly at [phone].  Normal or non-critical lab and imaging results will be communicated to you by MyChart, letter or phone within 4 business days after the clinic has received the results. If you do not hear from us within 7 days, please contact the clinic through [employer] or phone. If you have a critical or abnormal lab result, we will notify you by phone as soon as possible.  Submit refill requests through [employer] or call your pharmacy and they will forward the refill request to us. Please allow 3 business days for your refill to be completed.          Additional Information About Your Visit        MyChart Information     [employer] gives you secure access to your electronic health record. If you see a primary care provider, you can also send messages to your care team and make appointments. If you have questions, please call your primary care clinic.  If you do not have a primary care provider, please call [phone] and they will assist you.        Care EveryWhere ID     This is your Care EveryWhere ID. This could be used by other organizations  "to access your [place] medical records  [id number]        Your Vitals Were     Pulse Temperature Height Pulse Oximetry BMI (Body Mass Index)       74 97.8  F (36.6  C) (Oral) 5' 9\" (1.753 m) 98% 25.4 kg/m2        Blood Pressure from Last 3 Encounters:   05/23/17 129/80   04/11/17 131/80   02/17/17 121/87    Weight from Last 3 Encounters:   05/23/17 172 lb (78 kg)   04/11/17 180 lb (81.6 kg)   02/17/17 175 lb (79.4 kg)              Today, you had the following     No orders found for display         Today's Medication Changes          These changes are accurate as of: 5/23/17  2:11 PM.  If you have any questions, ask your nurse or doctor.               Start taking these medicines.        Dose/Directions    busPIRone 5 MG tablet   Commonly known as:  BUSPAR   Used for:  Generalized anxiety disorder, Major depressive disorder, single episode, mild (H)   Started by:  Heydi Butler APRN CNP        Take 5 mg daily for 3 days, then 5mg twice a day for 3 days, and then 5mg 3 times a day.   Quantity:  90 tablet   Refills:  2            Where to get your medicines      Some of these will need a paper prescription and others can be bought over the counter.  Ask your nurse if you have questions.     Bring a paper prescription for each of these medications     busPIRone 5 MG tablet                Primary Care Provider Office Phone # Fax #    DAVIN Armstrong [phone]       [facility] [address] [place] [address]        Thank you!     Thank you for choosing [facility]  for your care. Our goal is always to provide you with excellent care. Hearing back from our patients is one way we can continue to improve our services. Please take a few minutes to complete the written survey that you may receive in the mail after your visit with us. Thank you!             Your Updated Medication List - Protect others around you: Learn how to safely " use, store and throw away your medicines at www.disposemymeds.org.          This list is accurate as of: 5/23/17  2:11 PM.  Always use your most recent med list.                   Brand Name Dispense Instructions for use    busPIRone 5 MG tablet    BUSPAR    90 tablet    Take 5 mg daily for 3 days, then 5mg twice a day for 3 days, and then 5mg 3 times a day.       doxycycline 100 MG capsule    VIBRAMYCIN    20 capsule    Take 1 capsule (100 mg) by mouth 2 times daily       NASACORT AQ NA          sertraline 50 MG tablet    ZOLOFT    90 tablet    Take 1 tablet (50 mg) by mouth daily

## 2017-05-23 NOTE — NURSING NOTE
"Chief Complaint   Patient presents with     Anxiety     Depression       Initial /80  Pulse 74  Temp 97.8  F (36.6  C) (Oral)  Ht 5' 9\" (1.753 m)  Wt 172 lb (78 kg)  SpO2 98%  BMI 25.4 kg/m2 Estimated body mass index is 25.4 kg/(m^2) as calculated from the following:    Height as of this encounter: 5' 9\" (1.753 m).    Weight as of this encounter: 172 lb (78 kg).  Medication Reconciliation: complete       Carlos Kimble MA       "

## 2017-05-24 ASSESSMENT — PATIENT HEALTH QUESTIONNAIRE - PHQ9: SUM OF ALL RESPONSES TO PHQ QUESTIONS 1-9: 6

## 2017-05-24 ASSESSMENT — ANXIETY QUESTIONNAIRES: GAD7 TOTAL SCORE: 10

## 2017-06-05 ENCOUNTER — THERAPY VISIT (OUTPATIENT)
Dept: PHYSICAL THERAPY | Facility: CLINIC | Age: 37
End: 2017-06-05
Payer: COMMERCIAL

## 2017-06-05 DIAGNOSIS — M54.50 CHRONIC MIDLINE LOW BACK PAIN WITHOUT SCIATICA: ICD-10-CM

## 2017-06-05 DIAGNOSIS — G89.29 CHRONIC MIDLINE LOW BACK PAIN WITHOUT SCIATICA: ICD-10-CM

## 2017-06-05 PROCEDURE — 97110 THERAPEUTIC EXERCISES: CPT | Mod: GP | Performed by: PHYSICAL THERAPIST

## 2017-06-05 PROCEDURE — 97112 NEUROMUSCULAR REEDUCATION: CPT | Mod: GP | Performed by: PHYSICAL THERAPIST

## 2017-06-05 PROCEDURE — 97530 THERAPEUTIC ACTIVITIES: CPT | Mod: GP | Performed by: PHYSICAL THERAPIST

## 2017-06-06 NOTE — PROGRESS NOTES
Subjective:    HPI  Oswestry Score: 2 %                 Objective:    System    Physical Exam    General     ROS    Assessment/Plan:      DISCHARGE REPORT    Progress reporting period is from IE to 6/5/2017.     SUBJECTIVE  Subjective: Pt reports no pain with daily, functional activites.  She is present today to discuss and learn how to progress into regular exercise program, has some fear over re-injury.  Also requesting letter of recommendation for work accommadation and standing work station   Current Pain level: 0/10            ;   ,     The objective findings are from DOS 6/5/2017.    OBJECTIVE  Objective: Resolution of shift.  Full AROM lumbar spine without pain.  Disc of pt goals: wants to add weight lifting and aerobic exercise.  Likes group classes but indicates she typically gets hurt.  Has access to gym environment.        ASSESSMENT/PLAN  Updated problem list and treatment plan: Diagnosis 1:  LBP    STG/LTGs have been met or progress has been made towards goals:  Yes (See Goal flow sheet completed today.)  Assessment of Progress: The patient has met all of their long term goals.  Self Management Plans:  Patient is independent in a home treatment program.  Patient is independent in self management of symptoms.  I have re-evaluated this patient and find that the nature, scope, duration and intensity of the therapy is appropriate for the medical condition of the patient.  Марина continues to require the following intervention to meet STG and LTG's: PT intervention is no longer required to meet STG/LTG.  We will discharge this patient from PT.    Recommendations:  This patient is ready to be discharged from therapy and continue their home treatment program.    Please refer to the daily flowsheet for treatment today, total treatment time and time spent performing 1:1 timed codes.

## 2017-06-21 ENCOUNTER — MYC MEDICAL ADVICE (OUTPATIENT)
Dept: FAMILY MEDICINE | Facility: CLINIC | Age: 37
End: 2017-06-21

## 2017-07-10 ENCOUNTER — NURSE TRIAGE (OUTPATIENT)
Dept: NURSING | Facility: CLINIC | Age: 37
End: 2017-07-10

## 2017-07-10 NOTE — TELEPHONE ENCOUNTER
Additional Information    Negative: [1] Major bleeding (e.g., actively dripping or spurting) AND [2] can't be stopped    Negative: Amputation    Negative: Shock suspected (e.g., cold/pale/clammy skin, too weak to stand, low BP, rapid pulse)    Negative: [1] Knife wound (or other possibly deep cut) AND [2] to chest, abdomen, back, neck, or head    Negative: [1] Cutter (self-mutilator) AND [2] suicidal or out-of-control    Negative: Sounds like a life-threatening emergency to the triager    Negative: [1] Animal bite AND [2] broken skin    Negative: [1] Human bite AND [2] broken skin    Negative: [1] Bleeding AND [2] won't stop after 10 minutes of direct pressure (using correct technique)    Negative: Skin is split open or gaping  (or length > 1/2 inch or 12 mm on the skin, 1/4 inch or 6 mm on the face)    Negative: [1] Deep cut AND [2] can see bone or tendons    Negative: Sensation of something in the wound (i.e., retained object in wound)    Negative: [1] Dirt in the wound AND [2] not removed with 15 minutes of scrubbing    Negative: Wound causes numbness (i.e., loss of sensation)    Negative: Wound causes weakness (i.e., decreased ability to move hand, finger, toe)    Negative: [1] SEVERE pain AND [2] not improved 2 hours after pain medicine    Negative: [1] Looks infected AND [2] large red area (>2 inches or 5 cm) or streak    Negative: [1] Fever AND [2] bright red area or streak    Negative: Suspicious history for the injury    Negative: [1] Looks infected (spreading redness, pus) AND [2] no fever    Negative: [1] Last tetanus shot > 5 years ago AND [2] DIRTY cut    Negative: [1] Last tetanus shot > 10 years ago AND [2] CLEAN cut    Negative: [1] After 14 days AND [2] wound isn't healed    Negative: [1] Diabetic (diabetes mellitus) AND [2] minor cut or scratch on foot    Negative: [1] Cutter (self-mutilator) AND [2] stable (i.e., not suicidal, not out of control)    Minor cut or scratch (all triage questions  negative)    Protocols used: CUTS AND LACERATIONS-ADULT-AH

## 2017-08-24 DIAGNOSIS — F32.0 MAJOR DEPRESSIVE DISORDER, SINGLE EPISODE, MILD (H): ICD-10-CM

## 2017-08-24 DIAGNOSIS — F41.1 GENERALIZED ANXIETY DISORDER: ICD-10-CM

## 2017-08-24 RX ORDER — BUSPIRONE HYDROCHLORIDE 5 MG/1
5 TABLET ORAL 3 TIMES DAILY
Qty: 90 TABLET | Refills: 3 | Status: SHIPPED | OUTPATIENT
Start: 2017-08-24 | End: 2017-12-01

## 2017-08-24 NOTE — TELEPHONE ENCOUNTER
Rx refilled per RN protocol.      busPIRone (BUSPAR) 5 MG tablet  Last Written Prescription Date: 5/23/17  Last Fill Quantity: 90; # refills: 2  Last Office Visit with [facility], P or [facility] prescribing provider:  5/23/17       Last PHQ-9 score on record=   PHQ-9 SCORE 5/23/2017   Total Score MyChart -   Total Score 6       No results found for: AST  No results found for: ALT    Salvador Desir RN

## 2017-09-19 ENCOUNTER — ALLIED HEALTH/NURSE VISIT (OUTPATIENT)
Dept: NURSING | Facility: CLINIC | Age: 37
End: 2017-09-19
Payer: COMMERCIAL

## 2017-09-19 DIAGNOSIS — Z23 NEED FOR PROPHYLACTIC VACCINATION AND INOCULATION AGAINST INFLUENZA: Primary | ICD-10-CM

## 2017-09-19 PROCEDURE — 90471 IMMUNIZATION ADMIN: CPT

## 2017-09-19 PROCEDURE — 90686 IIV4 VACC NO PRSV 0.5 ML IM: CPT

## 2017-09-19 PROCEDURE — 99207 ZZC NO CHARGE NURSE ONLY: CPT

## 2017-09-19 NOTE — PROGRESS NOTES
Injectable Influenza Immunization Documentation    1.  Is the person to be vaccinated sick today?   No    2. Does the person to be vaccinated have an allergy to a component   of the vaccine?   No    3. Has the person to be vaccinated ever had a serious reaction   to influenza vaccine in the past?   No    4. Has the person to be vaccinated ever had Guillain-Barré syndrome?   No    Form completed by Emil Jordan

## 2017-09-19 NOTE — MR AVS SNAPSHOT
After Visit Summary   9/19/2017    Марина Whitehead    MRN: [id number]           Patient Information     Date Of Birth          1980        Visit Information        Provider Department      9/19/2017 11:35 AM CARE COORDINATOR [facility]        Today's Diagnoses     Need for prophylactic vaccination and inoculation against influenza    -  1       Follow-ups after your visit        Who to contact     If you have questions or need follow up information about today's clinic visit or your schedule please contact [facility] directly at [phone].  Normal or non-critical lab and imaging results will be communicated to you by "[employer]hart, letter or phone within 4 business days after the clinic has received the results. If you do not hear from us within 7 days, please contact the clinic through [employer] or phone. If you have a critical or abnormal lab result, we will notify you by phone as soon as possible.  Submit refill requests through [employer] or call your pharmacy and they will forward the refill request to us. Please allow 3 business days for your refill to be completed.          Additional Information About Your Visit        MyChart Information     [employer] gives you secure access to your electronic health record. If you see a primary care provider, you can also send messages to your care team and make appointments. If you have questions, please call your primary care clinic.  If you do not have a primary care provider, please call [phone] and they will assist you.        Care EveryWhere ID     This is your Care EveryWhere ID. This could be used by other organizations to access your [facility] medical records  [id number]         Blood Pressure from Last 3 Encounters:   05/23/17 129/80   04/11/17 131/80   02/17/17 121/87    Weight from Last 3 Encounters:   05/23/17 172 lb (78 kg)   04/11/17 180 lb (81.6 kg)   02/17/17 175 lb (79.4 kg)               We Performed the Following     FLU VAC, SPLIT VIRUS IM > 3 YO (QUADRIVALENT) [45903]     Vaccine Administration, Initial [[id number]]        Primary Care Provider Office Phone # Fax #    DAVIN Armstrong TONI [phone] [address] [place] [address]        Equal Access to Services     [facility]: Hadii aad ku hadasho Soomaali, waaxda luqadaha, qaybta kaalmada adeegyada, waxay idiin hayaan adeeg kharash layg . So [facility] [phone].    ATENCIÓN: Si habla español, tiene a lamas disposición servicios gratuitos de asistencia lingüística. Stephanie al [phone].    We comply with applicable federal civil rights laws and [place] laws. We do not discriminate on the basis of race, color, national origin, age, disability, sex, sexual orientation, or gender identity.            Thank you!     Thank you for choosing [facility]  for your care. Our goal is always to provide you with excellent care. Hearing back from our patients is one way we can continue to improve our services. Please take a few minutes to complete the written survey that you may receive in the mail after your visit with us. Thank you!             Your Updated Medication List - Protect others around you: Learn how to safely use, store and throw away your medicines at www.disposemymeds.org.          This list is accurate as of: 9/19/17 11:59 PM.  Always use your most recent med list.                   Brand Name Dispense Instructions for use Diagnosis    busPIRone 5 MG tablet    BUSPAR    90 tablet    Take 1 tablet (5 mg) by mouth 3 times daily    Generalized anxiety disorder, Major depressive disorder, single episode, mild (H)       doxycycline 100 MG capsule    VIBRAMYCIN    20 capsule    Take 1 capsule (100 mg) by mouth 2 times daily    Frequent episodes of sinusitis       NASACORT AQ NA           sertraline 50 MG tablet    ZOLOFT    90 tablet    Take 1 tablet (50 mg) by mouth daily     Major depressive disorder, recurrent episode, moderate (H), Generalized anxiety disorder

## 2017-11-29 ENCOUNTER — THERAPY VISIT (OUTPATIENT)
Dept: PHYSICAL THERAPY | Facility: CLINIC | Age: 37
End: 2017-11-29
Payer: COMMERCIAL

## 2017-11-29 DIAGNOSIS — M54.50 LUMBAGO: Primary | ICD-10-CM

## 2017-11-29 PROCEDURE — 97161 PT EVAL LOW COMPLEX 20 MIN: CPT | Mod: GP | Performed by: PHYSICAL THERAPIST

## 2017-11-29 NOTE — PROGRESS NOTES
[facility] Initial Evaluation    Subjective:    Patient is a 37 year old female presenting with rehab back hpi. The history is provided by the patient.   Марина Whitehead is a 37 year old female with a lumbar condition.  Condition occurred with:  Insidious onset.  Condition occurred: for unknown reasons.  This is a chronic condition  10/15/2017  SMHx: Acute LBP w lateral shift resolved in June..    Patient reports pain:  Lumbar spine right (R UQ just below rib, ant groin area).  Radiates to:  Thigh right.  Pain is described as sharp and aching and is intermittent and reported as 3/10.  Associated with: constipation related to IBS. Pain is the same all the time.  Symptoms are exacerbated by standing (sneezing and coughing; wearing small heels) and relieved by nothing (some relief with stabilization).  Since onset symptoms are unchanged.    Previous treatment includes physical therapy.  There was significant improvement following previous treatment.  General health as reported by patient is good.                      Red flags:  None as reported by the patient.                        Objective:    System    Physical Exam    General     ROS     Present symptoms: right LB/12th rib  Pain quality (sharp/shooting/stabbing/aching/burning/cramping):  sharp   Paresthesia (yes/no):  no    Symptoms at onset (back/thigh/leg): back  Constant symptoms (back/thigh/leg): no  Intermittent symptoms (back/thigh/leg): back and ant groin      Disturbed sleep (yes/no):  no Sleeping postures (prone/sup/side R/L): side    Previous episodes (0/1-5/6-10/11+): 1-5 Year of first episode: 2015      Specific Questions:  Cough/Sneeze/Strain (pos/neg): pos  Bowel/Bladder (normal/abnormal): normal  Gait (normal/abnormal): antalgic  Recent or major surgery (yes/no):  no  Night pain (yes/no): no  Accidents (yes/no): no  Unexplained weight loss (yes/no): no  Barriers at home: no      EXAMINATION    Posture:   Sitting (good/fair/poor):  poor  Standing (good/fair/poor):poor  Lordosis (red/acc/normal): red  Correction of posture (better/worse/no effect): NE    Lateral Shift (right/left/nil): left  Relevant (yes/no):  yes  Other Observations: able to self correct shift, but not maintain    Neurological:    EXAMINATION  Neurological testing (myotomes, sensation, reflexes, nerve tension) not indicated at this time.      Movement Loss:   Peng Mod Min Nil Pain   Flexion x    +++   Extension    x -   Side Gliding R   x  ++   Side Gliding L    x -     Test Movements:   During: produces, abolishes, increases, decreases, no effect, centralizing, peripheralizing   After: better, worse, no better, no worse, no effect, centralized, peripheralized        If required, pretest symptoms: right LB   Symptoms During Symptoms After ROM increased ROM decreased No Effect   SGIS - R Increases    No Worse         Rep SGIS - R Centralising    Better    x     SGIS - L        Rep SGIS - L          Static Tests:  Prone lying worse    Other Tests: na    Provisional Classification:  Derangement - Asymmetrical, unilateral, symptoms above knee, with shift deformity    Principle of Management:    Education: Therapeutic Exercise (12 min including instruction with feedback regarding proper form/mechanics): Pt education regarding four stages of healing: pain reduction, maintenance through exercise, recovery of function, and prevention of re-occurrence. Utilized prescription dosage of exercise theory, cut finger analogy, and scientific method to help patient understand purpose of exercise specificity and importance of compliance with exercise.  Pt also educated in traffic light response to exercise, and self assessment to guide home exercise program.        Education: reduction of lateral shift then core porgression    Mechanical therapy (Y/N):  y           Assessment/Plan:      Patient is a 37 year old female with lumbar complaints.    Patient has the following significant findings  with corresponding treatment plan.                Diagnosis 1:  LBP  Pain -  manual therapy, self management, education, directional preference exercise and home program  Decreased ROM/flexibility - manual therapy and therapeutic exercise  Decreased strength - therapeutic exercise and therapeutic activities  Impaired muscle performance - neuro re-education  Decreased function - therapeutic activities    Therapy Evaluation Codes:   1) History comprised of:   Personal factors that impact the plan of care:      Past/current experiences.    Comorbidity factors that impact the plan of care are:      Depression.     Medications impacting care: None.  2) Examination of Body Systems comprised of:   Body structures and functions that impact the plan of care:      Lumbar spine.   Activity limitations that impact the plan of care are:      Bending and Lifting.  3) Clinical presentation characteristics are:   Evolving/Changing.  4) Decision-Making    Low complexity using standardized patient assessment instrument and/or measureable assessment of functional outcome.  Cumulative Therapy Evaluation is: Low complexity.    Previous and current functional limitations:  (See Goal Flow Sheet for this information)    Short term and Long term goals: (See Goal Flow Sheet for this information)     Communication ability:  Patient appears to be able to clearly communicate and understand verbal and written communication and follow directions correctly.  Treatment Explanation - The following has been discussed with the patient:   RX ordered/plan of care  Anticipated outcomes  Possible risks and side effects  This patient would benefit from PT intervention to resume normal activities.   Rehab potential is good.    Frequency:  1 X week, once daily  Duration:  Every 2 weeks for 8 weeks  Discharge Plan:  Achieve all LTG.  Independent in home treatment program.  Reach maximal therapeutic benefit.    Please refer to the daily flowsheet for treatment  today, total treatment time and time spent performing 1:1 timed codes.

## 2017-12-01 ENCOUNTER — OFFICE VISIT (OUTPATIENT)
Dept: FAMILY MEDICINE | Facility: CLINIC | Age: 37
End: 2017-12-01
Payer: COMMERCIAL

## 2017-12-01 VITALS
TEMPERATURE: 98.7 F | DIASTOLIC BLOOD PRESSURE: 72 MMHG | RESPIRATION RATE: 16 BRPM | SYSTOLIC BLOOD PRESSURE: 117 MMHG | BODY MASS INDEX: 25.49 KG/M2 | OXYGEN SATURATION: 98 % | WEIGHT: 172.6 LBS | HEART RATE: 81 BPM

## 2017-12-01 DIAGNOSIS — F41.1 GENERALIZED ANXIETY DISORDER: ICD-10-CM

## 2017-12-01 DIAGNOSIS — K52.9 IBD (INFLAMMATORY BOWEL DISEASE): Primary | ICD-10-CM

## 2017-12-01 DIAGNOSIS — F32.0 MAJOR DEPRESSIVE DISORDER, SINGLE EPISODE, MILD (H): ICD-10-CM

## 2017-12-01 DIAGNOSIS — M51.369 DDD (DEGENERATIVE DISC DISEASE), LUMBAR: ICD-10-CM

## 2017-12-01 PROCEDURE — 99214 OFFICE O/P EST MOD 30 MIN: CPT | Performed by: NURSE PRACTITIONER

## 2017-12-01 RX ORDER — BUSPIRONE HYDROCHLORIDE 5 MG/1
5 TABLET ORAL 2 TIMES DAILY
Qty: 90 TABLET | Refills: 3 | Status: SHIPPED | OUTPATIENT
Start: 2017-12-01 | End: 2021-03-22

## 2017-12-01 ASSESSMENT — ANXIETY QUESTIONNAIRES
IF YOU CHECKED OFF ANY PROBLEMS ON THIS QUESTIONNAIRE, HOW DIFFICULT HAVE THESE PROBLEMS MADE IT FOR YOU TO DO YOUR WORK, TAKE CARE OF THINGS AT HOME, OR GET ALONG WITH OTHER PEOPLE: SOMEWHAT DIFFICULT
3. WORRYING TOO MUCH ABOUT DIFFERENT THINGS: NOT AT ALL
7. FEELING AFRAID AS IF SOMETHING AWFUL MIGHT HAPPEN: NOT AT ALL
1. FEELING NERVOUS, ANXIOUS, OR ON EDGE: SEVERAL DAYS
GAD7 TOTAL SCORE: 3
5. BEING SO RESTLESS THAT IT IS HARD TO SIT STILL: NOT AT ALL
2. NOT BEING ABLE TO STOP OR CONTROL WORRYING: NOT AT ALL
6. BECOMING EASILY ANNOYED OR IRRITABLE: SEVERAL DAYS

## 2017-12-01 ASSESSMENT — PATIENT HEALTH QUESTIONNAIRE - PHQ9
SUM OF ALL RESPONSES TO PHQ QUESTIONS 1-9: 4
5. POOR APPETITE OR OVEREATING: SEVERAL DAYS

## 2017-12-01 NOTE — PROGRESS NOTES
SUBJECTIVE:   Марина Whitehead is a 37 year old female who presents to clinic today for the following health issues:    Depression Followup    Status since last visit: Worsened - since going off Zoloft    See PHQ-9 for current symptoms.  Other associated symptoms: None    Complicating factors:   Significant life event:  No   Current substance abuse:  None  Anxiety or Panic symptoms:  Yes-  Anxiety     PHQ-9 Score and MyChart F/U Questions 2017   Total Score 1 6 4   Q9: Suicide Ideation Not at all Not at all Not at all     PHQ-9  English  PHQ-9   Any Language  Suicide Assessment Five-step Evaluation and Treatment (SAFE-T)      Problems taking medications regularly: Yes- taking the buspar 2 times a day instead of 3 times a day. Hard to get the middle of the day dose in.    Medication side effects: none    Diet: low fat/cholesterol- for IBS    Also would like to discuss IBS symptoms bad constipation, bloating, gas and stomach pain x 1 year.  Has been dealing with this for many years.  Alternates constipation and diarrhea.  Seems like it is worsening and she is very careful re her diet.  Would like to go to GI.      Would like orders for PT for back. - Has been seen in [facility] for several years for chronic back pain.  Very helpful.  Orders have .  Would like a new PT order.          Problem list and histories reviewed & adjusted, as indicated.  Additional history: as documented    Reviewed and updated as needed this visit by clinical staffTobacco  Allergies  Meds  Problems  Med Hx  Surg Hx  Fam Hx  Soc Hx        Reviewed and updated as needed this visit by Provider  Tobacco  Allergies  Meds  Problems  Med Hx  Surg Hx  Fam Hx  Soc Hx          ROS:  Constitutional, HEENT, cardiovascular, pulmonary, gi and gu systems are negative, except as otherwise noted.      OBJECTIVE:   /72 (BP Location: Left arm, Patient Position: Chair, Cuff Size: Adult Regular)  Pulse 81  Temp 98.7   F (37.1  C) (Oral)  Resp 16  Wt 172 lb 9.6 oz (78.3 kg)  SpO2 98%  BMI 25.49 kg/m2  Body mass index is 25.49 kg/(m^2).  GENERAL: healthy, alert and no distress  RESP: lungs clear to auscultation - no rales, rhonchi or wheezes  CV: regular rate and rhythm, normal S1 S2, no S3 or S4, no murmur, click or rub, no peripheral edema and peripheral pulses strong  ABDOMEN: soft, nontender, no hepatosplenomegaly, no masses and bowel sounds normal  MS: no gross musculoskeletal defects noted, no edema  SKIN: no suspicious lesions or rashes  PSYCH: mentation appears normal and affect normal/bright    PHQ-9 SCORE 4/11/2017 5/23/2017 12/1/2017   Total Score MyChart - - -   Total Score 1 6 4         ASSESSMENT/PLAN:       ICD-10-CM    1. IBD (inflammatory bowel disease) K52.9 GASTROENTEROLOGY ADULT REF CONSULT ONLY     GASTROENTEROLOGY ADULT REF CONSULT ONLY   2. Generalized anxiety disorder F41.1 busPIRone (BUSPAR) 5 MG tablet   3. Major depressive disorder, single episode, mild (H) F32.0 sertraline (ZOLOFT) 50 MG tablet     busPIRone (BUSPAR) 5 MG tablet   4. DDD (degenerative disc disease), lumbar M51.36 [facility] PT, HAND, AND CHIROPRACTIC REFERRAL     Refer to GI for IBS.      Discussed the pathophysiology of anxiety episodes and the various symptoms seen associated with anxiety episodes.  Discussed possible triggers including fatigue, depression, stress, and chemicals such as alcohol, caffeine and certain drugs.Discussed the treatment including an aerobic exercise program, adequate rest, and both rescue meds and maintenance meds.    I discussed the potential side effects of antidepressant medications as well as the likelihood of worsening before improvement over the next few weeks.  I reemphasized the importance of a multi-armed approach towards the treatment of depression including regular exercise, adequate sleep, and a well-rounded, low-glycemic diet.  In addition, I emphasized the importance of ongoing development of her  support network. If new or worsening symptoms, she will seek help immediately.    [facility] for PT.    See Patient Instructions    DAVIN Fisher CNP  [facility]

## 2017-12-01 NOTE — MR AVS SNAPSHOT
After Visit Summary   12/1/2017    Марина Whitehead    MRN: [id number]           Patient Information     Date Of Birth          1980        Visit Information        Provider Department      12/1/2017 10:40 AM eZe Merrill APRN [facility]        Today's Diagnoses     IBD (inflammatory bowel disease)    -  1    Generalized anxiety disorder        Major depressive disorder, single episode, mild (H)        DDD (degenerative disc disease), lumbar           Follow-ups after your visit        Additional Services     GASTROENTEROLOGY ADULT REF CONSULT ONLY       Preferred Location: [place] [facility] [phone]      Please be aware that coverage of these services is subject to the terms and limitations of your health insurance plan.  Call member services at your health plan with any benefit or coverage questions.  Any procedures must be performed at a [facility] facility OR coordinated by your clinic's referral office.    Please bring the following with you to your appointment:    (1) Any X-Rays, CTs or MRIs which have been performed.  Contact the facility where they were done to arrange for  prior to your scheduled appointment.    (2) List of current medications   (3) This referral request   (4) Any documents/labs given to you for this referral            GASTROENTEROLOGY ADULT REF CONSULT ONLY       Preferred Location: [facility] [phone]      Please be aware that coverage of these services is subject to the terms and limitations of your health insurance plan.  Call member services at your health plan with any benefit or coverage questions.  Any procedures must be performed at a [facility] facility OR coordinated by your clinic's referral office.    Please bring the following with you to your appointment:    (1) Any X-Rays, CTs or MRIs which have been performed.  Contact the facility where they were done to arrange for  prior to your scheduled appointment.    (2)  List of current medications   (3) This referral request   (4) Any documents/labs given to you for this referral            [facility] PT, HAND, AND CHIROPRACTIC REFERRAL       **This order will print in the [facility] Scheduling Office**    Physical Therapy, Hand Therapy and Chiropractic Care are available through:    *[facility]  *[facility]  *[facility]    Call one number to schedule at any of the above locations: [phone].    Your provider has referred you to: Physical Therapy at [facility] or [facility]    Indication/Reason for Referral: Low Back Pain  Onset of Illness: years  Therapy Orders: Evaluate and Treat  Special Programs: None  Special Request: None    Bree Giron      Additional Comments for the Therapist or Chiropractor:     Please be aware that coverage of these services is subject to the terms and limitations of your health insurance plan.  Call member services at your health plan with any benefit or coverage questions.      Please bring the following to your appointment:    *Your personal calendar for scheduling future appointments  *Comfortable clothing                  Your next 10 appointments already scheduled     Dec 11, 2017 10:40 AM CST   [facility] Spine with Terri Ferrell PT   [facility]    [address] [place] [address] [phone]              Who to contact     If you have questions or need follow up information about today's clinic visit or your schedule please contact [facility] directly at [phone].  Normal or non-critical lab and imaging results will be communicated to you by MyChart, letter or phone within 4 business days after the clinic has received the results. If you do not hear from us within 7 days, please contact the clinic through MyChart or phone. If you have a critical or abnormal lab result, we will notify you by phone as soon as possible.  Submit refill  requests through [employer] or call your pharmacy and they will forward the refill request to us. Please allow 3 business days for your refill to be completed.          Additional Information About Your Visit        [employer] Information     [employer] gives you secure access to your electronic health record. If you see a primary care provider, you can also send messages to your care team and make appointments. If you have questions, please call your primary care clinic.  If you do not have a primary care provider, please call [phone] and they will assist you.        Care EveryWhere ID     This is your Care EveryWhere ID. This could be used by other organizations to access your [facility] medical records  [id number]        Your Vitals Were     Pulse Temperature Respirations Pulse Oximetry BMI (Body Mass Index)       81 98.7  F (37.1  C) (Oral) 16 98% 25.49 kg/m2        Blood Pressure from Last 3 Encounters:   12/01/17 117/72   05/23/17 129/80   04/11/17 131/80    Weight from Last 3 Encounters:   12/01/17 172 lb 9.6 oz (78.3 kg)   05/23/17 172 lb (78 kg)   04/11/17 180 lb (81.6 kg)              We Performed the Following     GASTROENTEROLOGY ADULT REF CONSULT ONLY     GASTROENTEROLOGY ADULT REF CONSULT ONLY     [facility], HAND, AND CHIR[facility]RACTIC REFERRAL          Today's Medication Changes          These changes are accurate as of: 12/1/17 11:06 AM.  If you have any questions, ask your nurse or doctor.               These medicines have changed or have updated prescriptions.        Dose/Directions    busPIRone 5 MG tablet   Commonly known as:  BUSPAR   This may have changed:  when to take this   Used for:  Generalized anxiety disorder, Major depressive disorder, single episode, mild (H)   Changed by:  Zee Merrill APRN CNP        Dose:  5 mg   Take 1 tablet (5 mg) by mouth 2 times daily   Quantity:  90 tablet   Refills:  3       * sertraline 50 MG tablet   Commonly known as:  ZOLOFT   This may have changed:  Another  medication with the same name was added. Make sure you understand how and when to take each.   Used for:  Major depressive disorder, recurrent episode, moderate (H), Generalized anxiety disorder   Changed by:  Zee Merrill APRN CNP        Dose:  50 mg   Take 1 tablet (50 mg) by mouth daily   Quantity:  90 tablet   Refills:  1       * sertraline 50 MG tablet   Commonly known as:  ZOLOFT   This may have changed:  You were already taking a medication with the same name, and this prescription was added. Make sure you understand how and when to take each.   Used for:  Major depressive disorder, single episode, mild (H)   Changed by:  Zee Merrill APRN CNP        Dose:  50 mg   Take 1 tablet (50 mg) by mouth daily   Quantity:  90 tablet   Refills:  3       * Notice:  This list has 2 medication(s) that are the same as other medications prescribed for you. Read the directions carefully, and ask your doctor or other care provider to review them with you.         Where to get your medicines      These medications were sent to [id number] - [place], [place] - [address] AT [facility]  [address], [place] [address]    Hours:  24-hours Phone:  [phone]     busPIRone 5 MG tablet    sertraline 50 MG tablet                Primary Care Provider Office Phone # Fax #    Heydi JACOB DAVIN Butler [phone] [address] [place] [address]        Equal Access to Services     [facility]: Hadii radha ku hadasho Soomaali, waaxda luqadaha, qaybta kaalmada adeegyada, luigi bateman. So [facility] [phone].    ATENCIÓN: Si habla vilma, tiene a lamas disposición servicios gratuitos de asistencia lingüística. Llame al [phone].    We comply with applicable federal civil rights laws and [place] laws. We do not discriminate on the basis of race, color, national origin, age, disability, sex, sexual  orientation, or gender identity.            Thank you!     Thank you for choosing [facility]  for your care. Our goal is always to provide you with excellent care. Hearing back from our patients is one way we can continue to improve our services. Please take a few minutes to complete the written survey that you may receive in the mail after your visit with us. Thank you!             Your Updated Medication List - Protect others around you: Learn how to safely use, store and throw away your medicines at www.disposemymeds.org.          This list is accurate as of: 12/1/17 11:06 AM.  Always use your most recent med list.                   Brand Name Dispense Instructions for use Diagnosis    busPIRone 5 MG tablet    BUSPAR    90 tablet    Take 1 tablet (5 mg) by mouth 2 times daily    Generalized anxiety disorder, Major depressive disorder, single episode, mild (H)       NASACORT AQ NA           * sertraline 50 MG tablet    ZOLOFT    90 tablet    Take 1 tablet (50 mg) by mouth daily    Major depressive disorder, recurrent episode, moderate (H), Generalized anxiety disorder       * sertraline 50 MG tablet    ZOLOFT    90 tablet    Take 1 tablet (50 mg) by mouth daily    Major depressive disorder, single episode, mild (H)       * Notice:  This list has 2 medication(s) that are the same as other medications prescribed for you. Read the directions carefully, and ask your doctor or other care provider to review them with you.

## 2017-12-02 ASSESSMENT — ANXIETY QUESTIONNAIRES: GAD7 TOTAL SCORE: 3

## 2017-12-06 NOTE — PROGRESS NOTES
Subjective:    Patient is a 37 year old female presenting with rehab back hpi.                                      Pertinent medical history includes:  Depression.  Medical allergies: yes (amox/penicillin).  Other surgeries include:  Other (c section 3/10).  Current medications:  Anti-depressants.  Current occupation is .    Primary job tasks include:  Prolonged sitting.              Oswestry Score: 14 %                 Objective:    System    Physical Exam    General     ROS    Assessment/Plan:

## 2017-12-11 ENCOUNTER — THERAPY VISIT (OUTPATIENT)
Dept: PHYSICAL THERAPY | Facility: CLINIC | Age: 37
End: 2017-12-11
Payer: COMMERCIAL

## 2017-12-11 DIAGNOSIS — M54.50 LUMBAGO: Primary | ICD-10-CM

## 2017-12-11 PROCEDURE — 97110 THERAPEUTIC EXERCISES: CPT | Mod: GP | Performed by: PHYSICAL THERAPIST

## 2017-12-12 ENCOUNTER — TRANSFERRED RECORDS (OUTPATIENT)
Dept: HEALTH INFORMATION MANAGEMENT | Facility: CLINIC | Age: 37
End: 2017-12-12

## 2017-12-29 ENCOUNTER — TRANSFERRED RECORDS (OUTPATIENT)
Dept: HEALTH INFORMATION MANAGEMENT | Facility: CLINIC | Age: 37
End: 2017-12-29

## 2018-01-17 ENCOUNTER — TRANSFERRED RECORDS (OUTPATIENT)
Dept: HEALTH INFORMATION MANAGEMENT | Facility: CLINIC | Age: 38
End: 2018-01-17

## 2018-01-22 ENCOUNTER — OFFICE VISIT (OUTPATIENT)
Dept: FAMILY MEDICINE | Facility: CLINIC | Age: 38
End: 2018-01-22
Payer: COMMERCIAL

## 2018-01-22 VITALS
DIASTOLIC BLOOD PRESSURE: 66 MMHG | SYSTOLIC BLOOD PRESSURE: 103 MMHG | OXYGEN SATURATION: 100 % | HEART RATE: 70 BPM | TEMPERATURE: 96.8 F | RESPIRATION RATE: 16 BRPM

## 2018-01-22 DIAGNOSIS — R68.89 FLU-LIKE SYMPTOMS: Primary | ICD-10-CM

## 2018-01-22 LAB
FLUAV+FLUBV AG SPEC QL: NEGATIVE
FLUAV+FLUBV AG SPEC QL: NEGATIVE
SPECIMEN SOURCE: NORMAL

## 2018-01-22 PROCEDURE — 87804 INFLUENZA ASSAY W/OPTIC: CPT | Performed by: NURSE PRACTITIONER

## 2018-01-22 PROCEDURE — 99213 OFFICE O/P EST LOW 20 MIN: CPT | Performed by: NURSE PRACTITIONER

## 2018-01-22 RX ORDER — OSELTAMIVIR PHOSPHATE 75 MG/1
75 CAPSULE ORAL DAILY
Qty: 10 CAPSULE | Refills: 0 | Status: SHIPPED | OUTPATIENT
Start: 2018-01-22 | End: 2019-02-18

## 2018-01-22 NOTE — MR AVS SNAPSHOT
After Visit Summary   1/22/2018    Марина Whitehead    MRN: [id number]           Patient Information     Date Of Birth          1980        Visit Information        Provider Department      1/22/2018 4:00 PM Zee Merrill APRN CNP [facility]        Today's Diagnoses     Flu-like symptoms    -  1       Follow-ups after your visit        Your next 10 appointments already scheduled     Jan 22, 2018  4:00 PM CST   SHORT with DAVIN Fisher CNP   [facility] ([facility])    [address] [id number] [phone]              Who to contact     If you have questions or need follow up information about today's clinic visit or your schedule please contact [facility] directly at [phone].  Normal or non-critical lab and imaging results will be communicated to you by MyChart, letter or phone within 4 business days after the clinic has received the results. If you do not hear from us within 7 days, please contact the clinic through WalkMehart or phone. If you have a critical or abnormal lab result, we will notify you by phone as soon as possible.  Submit refill requests through [employer] or call your pharmacy and they will forward the refill request to us. Please allow 3 business days for your refill to be completed.          Additional Information About Your Visit        MyChart Information     Add[employer]ppers gives you secure access to your electronic health record. If you see a primary care provider, you can also send messages to your care team and make appointments. If you have questions, please call your primary care clinic.  If you do not have a primary care provider, please call [phone] and they will assist you.        Care EveryWhere ID     This is your Care EveryWhere ID. This could be used by other organizations to access your [place] medical records  [id number]        Your Vitals Were      Pulse Temperature Respirations Pulse Oximetry          70 96.8  F (36  C) (Tympanic) 16 100%         Blood Pressure from Last 3 Encounters:   01/22/18 103/66   12/01/17 117/72   05/23/17 129/80    Weight from Last 3 Encounters:   12/01/17 172 lb 9.6 oz (78.3 kg)   05/23/17 172 lb (78 kg)   04/11/17 180 lb (81.6 kg)              We Performed the Following     Influenza A/B antigen          Today's Medication Changes          These changes are accurate as of: 1/22/18 12:38 PM.  If you have any questions, ask your nurse or doctor.               Start taking these medicines.        Dose/Directions    oseltamivir 75 MG capsule   Commonly known as:  TAMIFLU   Used for:  Flu-like symptoms   Started by:  Zee Merrill APRN CNP        Dose:  75 mg   Take 1 capsule (75 mg) by mouth daily   Quantity:  10 capsule   Refills:  0            Where to get your medicines      These medications were sent to [id number] - [place], [place] - [address] AT [facility]  [address], [place] [address]    Hours:  24-hours Phone:  [phone]     oseltamivir 75 MG capsule                Primary Care Provider Office Phone # Fax #    DAVIN Armstrong [phone] [address] [place] [address]        Equal Access to Services     GERARD HUMPHRIES AH: Hadii radha case hadasho Sothomali, waaxda luqadaha, qaybta kaalmada adeegyada, luigi bateman. So [facility] [phone].    ATENCIÓN: Si habla español, tiene a lamas disposición servicios gratuitos de asistencia lingüística. Llame al [phone].    We comply with applicable federal civil rights laws and [place] laws. We do not discriminate on the basis of race, color, national origin, age, disability, sex, sexual orientation, or gender identity.            Thank you!     Thank you for choosing [facility]  for your care. Our goal is always to provide you with  excellent care. Hearing back from our patients is one way we can continue to improve our services. Please take a few minutes to complete the written survey that you may receive in the mail after your visit with us. Thank you!             Your Updated Medication List - Protect others around you: Learn how to safely use, store and throw away your medicines at www.disposemymeds.org.          This list is accurate as of: 1/22/18 12:38 PM.  Always use your most recent med list.                   Brand Name Dispense Instructions for use Diagnosis    busPIRone 5 MG tablet    BUSPAR    90 tablet    Take 1 tablet (5 mg) by mouth 2 times daily    Generalized anxiety disorder, Major depressive disorder, single episode, mild (H)       NASACORT AQ NA           oseltamivir 75 MG capsule    TAMIFLU    10 capsule    Take 1 capsule (75 mg) by mouth daily    Flu-like symptoms       * sertraline 50 MG tablet    ZOLOFT    90 tablet    Take 1 tablet (50 mg) by mouth daily    Major depressive disorder, recurrent episode, moderate (H), Generalized anxiety disorder       * sertraline 50 MG tablet    ZOLOFT    90 tablet    Take 1 tablet (50 mg) by mouth daily    Major depressive disorder, single episode, mild (H)       * Notice:  This list has 2 medication(s) that are the same as other medications prescribed for you. Read the directions carefully, and ask your doctor or other care provider to review them with you.

## 2018-01-22 NOTE — PROGRESS NOTES
SUBJECTIVE:   Марина Whitehead is a 38 year old female who presents to clinic today for the following health issues:    Patient is here today for Flu swab. Son tested positive. Had some fatigue yesterday.  Slight sore throat.  Feels hot.       Problem list and histories reviewed & adjusted, as indicated.  Additional history: as documented    Reviewed and updated as needed this visit by clinical staff  Tobacco  Allergies  Meds  Problems  Med Hx  Surg Hx  Fam Hx  Soc Hx        Reviewed and updated as needed this visit by Provider  Tobacco  Allergies  Meds  Problems  Med Hx  Surg Hx  Fam Hx  Soc Hx          ROS:  Review of systems negative except as stated above.    OBJECTIVE:  /66 (BP Location: Right arm, Patient Position: Chair, Cuff Size: Adult Regular)  Pulse 70  Temp 96.8  F (36  C) (Tympanic)  Resp 16  SpO2 100%  GENERAL APPEARANCE: healthy, alert and no distress  EYES: EOMI,  PERRL, conjunctiva clear  HENT: ear canals and TM's normal.  Nose and mouth without ulcers, erythema or lesions  NECK: supple, nontender, no lymphadenopathy  RESP: lungs clear to auscultation - no rales, rhonchi or wheezes  CV: regular rates and rhythm, normal S1 S2, no murmur noted  SKIN: no suspicious lesions or rashes    ASSESSMENT:  Viral syndrome    PLAN:  Fluids, Rest, OTC cough suppressant/expectorant, OTC decongestant/antihistamine and RX influenza  Tamiflu 75 mg qd x 10 days  I think this is primarily related to a viral illness given the various associated symptoms.  Went over the treatment of viral illnesses, which is primarily supportive.    Recheck if not improving as expected    See orders in Epic    Zee Merrill RN, CNP

## 2018-02-19 DIAGNOSIS — F41.1 GENERALIZED ANXIETY DISORDER: ICD-10-CM

## 2018-02-19 DIAGNOSIS — F32.0 MAJOR DEPRESSIVE DISORDER, SINGLE EPISODE, MILD (H): ICD-10-CM

## 2018-02-20 RX ORDER — BUSPIRONE HYDROCHLORIDE 5 MG/1
TABLET ORAL
Qty: 0.1 TABLET | Refills: 0 | OUTPATIENT
Start: 2018-02-20

## 2018-02-20 NOTE — TELEPHONE ENCOUNTER
"Medication marked as patient reported not taking on 1/22/18.    Team coordinators-Please clarify with patient whether or not she is still taking Buspirone 5 mg twice daily.    Thank you!  MARIA D Grover, RN        PHQ-9 SCORE 4/11/2017 5/23/2017 12/1/2017   Total Score MyChart - - -   Total Score 1 6 4     RUBINA-7 SCORE 4/11/2017 5/23/2017 12/1/2017   Total Score - - -   Total Score 4 10 3           Requested Prescriptions   Pending Prescriptions Disp Refills     busPIRone (BUSPAR) 5 MG tablet [Pharmacy Med Name: BUSPIRONE 5MG TABLETS] 90 tablet 0     Sig: TAKE 1 TABLET(5 MG) BY MOUTH THREE TIMES DAILY    Atypical Antidepressants Protocol Passed    2/19/2018  3:49 AM       Passed - Patient has PHQ-9 score less than 5 in past 6 months.    The PHQ-9 criteria is meant to fail. It requires a PHQ-9 score review         Passed - Patient is age 18 or older       Passed - No active pregnancy on record       Passed - No positive pregnancy test in past 12 mos       Passed - Recent (6 mo) or future visit with authorizing provider's specialty    Patient had office visit in the last 6 months or has a visit in the next 30 days with authorizing provider.  See \"Patient Info\" tab in inbasket, or \"Choose Columns\" in Meds & Orders section of the refill encounter.          \    "

## 2018-03-12 ENCOUNTER — OFFICE VISIT (OUTPATIENT)
Dept: URGENT CARE | Facility: URGENT CARE | Age: 38
End: 2018-03-12
Payer: COMMERCIAL

## 2018-03-12 VITALS
TEMPERATURE: 98.1 F | SYSTOLIC BLOOD PRESSURE: 120 MMHG | BODY MASS INDEX: 25.92 KG/M2 | OXYGEN SATURATION: 99 % | HEIGHT: 69 IN | WEIGHT: 175 LBS | DIASTOLIC BLOOD PRESSURE: 77 MMHG | HEART RATE: 89 BPM

## 2018-03-12 DIAGNOSIS — R00.2 PALPITATIONS: Primary | ICD-10-CM

## 2018-03-12 LAB
BASOPHILS # BLD AUTO: 0 10E9/L (ref 0–0.2)
BASOPHILS NFR BLD AUTO: 0.5 %
DIFFERENTIAL METHOD BLD: NORMAL
EOSINOPHIL # BLD AUTO: 0.3 10E9/L (ref 0–0.7)
EOSINOPHIL NFR BLD AUTO: 4.2 %
ERYTHROCYTE [DISTWIDTH] IN BLOOD BY AUTOMATED COUNT: 12.8 % (ref 10–15)
HCT VFR BLD AUTO: 44.6 % (ref 35–47)
HGB BLD-MCNC: 14.6 G/DL (ref 11.7–15.7)
LYMPHOCYTES # BLD AUTO: 2.9 10E9/L (ref 0.8–5.3)
LYMPHOCYTES NFR BLD AUTO: 36.7 %
MCH RBC QN AUTO: 32.2 PG (ref 26.5–33)
MCHC RBC AUTO-ENTMCNC: 32.7 G/DL (ref 31.5–36.5)
MCV RBC AUTO: 98 FL (ref 78–100)
MONOCYTES # BLD AUTO: 0.6 10E9/L (ref 0–1.3)
MONOCYTES NFR BLD AUTO: 7.3 %
NEUTROPHILS # BLD AUTO: 4 10E9/L (ref 1.6–8.3)
NEUTROPHILS NFR BLD AUTO: 51.3 %
PLATELET # BLD AUTO: 299 10E9/L (ref 150–450)
RBC # BLD AUTO: 4.54 10E12/L (ref 3.8–5.2)
WBC # BLD AUTO: 7.9 10E9/L (ref 4–11)

## 2018-03-12 PROCEDURE — 93000 ELECTROCARDIOGRAM COMPLETE: CPT | Performed by: FAMILY MEDICINE

## 2018-03-12 PROCEDURE — 36415 COLL VENOUS BLD VENIPUNCTURE: CPT | Performed by: FAMILY MEDICINE

## 2018-03-12 PROCEDURE — 84443 ASSAY THYROID STIM HORMONE: CPT | Performed by: FAMILY MEDICINE

## 2018-03-12 PROCEDURE — 80053 COMPREHEN METABOLIC PANEL: CPT | Performed by: FAMILY MEDICINE

## 2018-03-12 PROCEDURE — 85025 COMPLETE CBC W/AUTO DIFF WBC: CPT | Performed by: FAMILY MEDICINE

## 2018-03-12 PROCEDURE — 99214 OFFICE O/P EST MOD 30 MIN: CPT | Performed by: FAMILY MEDICINE

## 2018-03-12 RX ORDER — POLYETHYLENE GLYCOL 3350 17 G/17G
1 POWDER, FOR SOLUTION ORAL DAILY
COMMUNITY
End: 2019-07-24

## 2018-03-12 NOTE — PATIENT INSTRUCTIONS
"  * Heart Palpitations    Palpitations refers to the feeling that your heart is beating hard, fast or irregular. Some people describe it as \"pounding\" or \"skipped beats\". Palpitations may occur in persons with heart disease, but can also occur in healthy persons. Your doctor does not believe that anything dangerous is causing your symptoms at this time.  Heart-Related Causes:    Arrhythmia (a change from the heart's normal rhythm)    Disease of the heart valves  Non-Heart-Related Causes:    Certain medicines (such as asthma inhalers and decongestants)    Some herbal supplements, energy drinks and pills, and weight loss pills    Illegal stimulant drugs (such as cocaine, crank, methamphetamine, PCP)    Caffeine, alcohol and tobacco    Medical conditions such as thyroid disease, anemia, anxiety and panic disorder  Sometimes the cause cannot be found.  Home Care:  1. Avoid excess caffeine, alcohol, tobacco and any stimulant drugs.  2. Tell your doctor about any prescription or over-the-counter or herbal medicines you take.  Follow Up  with your doctor or as advised by our staff.  Get Prompt Medical Attention  if any of the following occur together with palpitations:    Weakness, dizziness, light-headed or fainting    Chest pain or shortness of breath    Rapid heart rate (over 120 beats per minute, at rest)    Palpitations that lasts over 20 minutes    Weakness of an arm or leg or one side of the face    Difficulty with speech or vision    8963-7273 [employer]. [address] [place] [address]. All rights reserved. This information is not intended as a substitute for professional medical care. Always follow your healthcare professional's instructions.  This information has been modified by your health care provider with permission from the publisher.        Understanding Heart Palpitations    Heart palpitations are a symptom. It s the feeling you have when your heartbeat seems to be racing, " pounding, skipping, or fluttering. Heart palpitations are most often felt in the chest. Sometimes, they may also be felt in the neck.  What causes heart palpitations?  In most cases, heart palpitations are caused by:    Stress or anxiety    Exercise    Pregnancy    Some medicines    Caffeine    Nicotine    Alcohol    Illegal drugs, such as cocaine    Health problems, such as anemia or overactive thyroid  In some cases, heart palpitations may be caused by a problem with the heart. Abnormal heart rhythms (arrhythmias) are the main concern. They may need to be managed by you and your healthcare provider or treated right away.  How are heart palpitations treated?  Treatments for heart palpitations depend on the cause. Options may include:    Managing the things that trigger your heart palpitations. This could mean:    Learning ways to reduce stress and anxiety    Avoiding caffeine, nicotine, alcohol, or illegal drugs    Stopping the use of certain medicines, under your doctor s guidance    Medicines, procedures, or surgery to treat an arrhythmia or other health problem that is causing your symptoms  What are the complications of heart palpitations?  Complications of heart palpitations are rare unless they are caused by a problem such as an arrhythmia. In such cases, complications can include:    Fainting    Heart failure. This problem occurs when the heart is so weak it no longer pumps blood well.    Blood clots and stroke    Sudden cardiac arrest. This problem occurs when the heart suddenly stops beating.  When should I call my healthcare provider?  Call your healthcare provider right away if you have any of these:    Fever of 100.4 F (38 C) or higher, or as directed    Symptoms that don t get better with treatment, or symptoms that get worse    New symptoms, such as chest pain, shortness of breath, dizziness, or fainting   Date Last Reviewed: 5/1/2016 2000-2017 [employer]. [address] [facility],  [place] [address]. All rights reserved. This information is not intended as a substitute for professional medical care. Always follow your healthcare professional's instructions.

## 2018-03-12 NOTE — NURSING NOTE
"Chief Complaint   Patient presents with     Urgent Care     [facility]     Palpitations     Headache       Initial /77  Pulse 89  Temp 98.1  F (36.7  C) (Tympanic)  Ht 5' 9\" (1.753 m)  Wt 175 lb (79.4 kg)  SpO2 99%  BMI 25.84 kg/m2 Estimated body mass index is 25.84 kg/(m^2) as calculated from the following:    Height as of this encounter: 5' 9\" (1.753 m).    Weight as of this encounter: 175 lb (79.4 kg).  Medication Reconciliation: complete   Ju Allen/ MA    "

## 2018-03-12 NOTE — MR AVS SNAPSHOT
"              After Visit Summary   3/12/2018    Марина Whitehead    MRN: [id number]           Patient Information     Date Of Birth          1980        Visit Information        Provider Department      3/12/2018 5:10 PM Fabiana Billingsley MD [facility]        Today's Diagnoses     Palpitations    -  1      Care Instructions      * Heart Palpitations    Palpitations refers to the feeling that your heart is beating hard, fast or irregular. Some people describe it as \"pounding\" or \"skipped beats\". Palpitations may occur in persons with heart disease, but can also occur in healthy persons. Your doctor does not believe that anything dangerous is causing your symptoms at this time.  Heart-Related Causes:    Arrhythmia (a change from the heart's normal rhythm)    Disease of the heart valves  Non-Heart-Related Causes:    Certain medicines (such as asthma inhalers and decongestants)    Some herbal supplements, energy drinks and pills, and weight loss pills    Illegal stimulant drugs (such as cocaine, crank, methamphetamine, PCP)    Caffeine, alcohol and tobacco    Medical conditions such as thyroid disease, anemia, anxiety and panic disorder  Sometimes the cause cannot be found.  Home Care:  1. Avoid excess caffeine, alcohol, tobacco and any stimulant drugs.  2. Tell your doctor about any prescription or over-the-counter or herbal medicines you take.  Follow Up  with your doctor or as advised by our staff.  Get Prompt Medical Attention  if any of the following occur together with palpitations:    Weakness, dizziness, light-headed or fainting    Chest pain or shortness of breath    Rapid heart rate (over 120 beats per minute, at rest)    Palpitations that lasts over 20 minutes    Weakness of an arm or leg or one side of the face    Difficulty with speech or vision    [id number] [employer]. [address] [place] [address]. All rights reserved. This information is " not intended as a substitute for professional medical care. Always follow your healthcare professional's instructions.  This information has been modified by your health care provider with permission from the publisher.        Understanding Heart Palpitations    Heart palpitations are a symptom. It s the feeling you have when your heartbeat seems to be racing, pounding, skipping, or fluttering. Heart palpitations are most often felt in the chest. Sometimes, they may also be felt in the neck.  What causes heart palpitations?  In most cases, heart palpitations are caused by:    Stress or anxiety    Exercise    Pregnancy    Some medicines    Caffeine    Nicotine    Alcohol    Illegal drugs, such as cocaine    Health problems, such as anemia or overactive thyroid  In some cases, heart palpitations may be caused by a problem with the heart. Abnormal heart rhythms (arrhythmias) are the main concern. They may need to be managed by you and your healthcare provider or treated right away.  How are heart palpitations treated?  Treatments for heart palpitations depend on the cause. Options may include:    Managing the things that trigger your heart palpitations. This could mean:    Learning ways to reduce stress and anxiety    Avoiding caffeine, nicotine, alcohol, or illegal drugs    Stopping the use of certain medicines, under your doctor s guidance    Medicines, procedures, or surgery to treat an arrhythmia or other health problem that is causing your symptoms  What are the complications of heart palpitations?  Complications of heart palpitations are rare unless they are caused by a problem such as an arrhythmia. In such cases, complications can include:    Fainting    Heart failure. This problem occurs when the heart is so weak it no longer pumps blood well.    Blood clots and stroke    Sudden cardiac arrest. This problem occurs when the heart suddenly stops beating.  When should I call my healthcare provider?  Call your  healthcare provider right away if you have any of these:    Fever of 100.4 F (38 C) or higher, or as directed    Symptoms that don t get better with treatment, or symptoms that get worse    New symptoms, such as chest pain, shortness of breath, dizziness, or fainting   Date Last Reviewed: 5/1/2016 2000-2017 [employer]. [address] [place] [address]. All rights reserved. This information is not intended as a substitute for professional medical care. Always follow your healthcare professional's instructions.                Follow-ups after your visit        Follow-up notes from your care team     Return if symptoms worsen or fail to improve.      Who to contact     If you have questions or need follow up information about today's clinic visit or your schedule please contact [facility] directly at [phone].  Normal or non-critical lab and imaging results will be communicated to you by eTutorhart, letter or phone within 4 business days after the clinic has received the results. If you do not hear from us within 7 days, please contact the clinic through eTutorhart or phone. If you have a critical or abnormal lab result, we will notify you by phone as soon as possible.  Submit refill requests through [employer] or call your pharmacy and they will forward the refill request to us. Please allow 3 business days for your refill to be completed.          Additional Information About Your Visit        eTutorhart Information     [employer] gives you secure access to your electronic health record. If you see a primary care provider, you can also send messages to your care team and make appointments. If you have questions, please call your primary care clinic.  If you do not have a primary care provider, please call [phone] and they will assist you.        Care EveryWhere ID     This is your Care EveryWhere ID. This could be used by other organizations to access your [facility]  "records  [id number]        Your Vitals Were     Pulse Temperature Height Pulse Oximetry BMI (Body Mass Index)       89 98.1  F (36.7  C) (Tympanic) 5' 9\" (1.753 m) 99% 25.84 kg/m2        Blood Pressure from Last 3 Encounters:   03/12/18 120/77   01/22/18 103/66   12/01/17 117/72    Weight from Last 3 Encounters:   03/12/18 175 lb (79.4 kg)   12/01/17 172 lb 9.6 oz (78.3 kg)   05/23/17 172 lb (78 kg)              We Performed the Following     CBC with platelets and differential     Comprehensive metabolic panel (BMP + Alb, Alk Phos, ALT, AST, Total. Bili, TP)     EKG 12-lead complete w/read - Clinics     TSH with free T4 reflex        Primary Care Provider Office Phone # Fax #    Heydi Butler, APRN [phone] [address] [place] [address]        Equal Access to Services     THEODORE HUMPHRIES : Hadii radha ku hadasho Soomaali, waaxda luqadaha, qaybta kaalmada adeegyada, luigi amin . So [facility] [phone].    ATENCIÓN: Si habla español, tiene a lamas disposición servicios gratuitos de asistencia lingüística. Llame al [phone].    We comply with applicable federal civil rights laws and [place] laws. We do not discriminate on the basis of race, color, national origin, age, disability, sex, sexual orientation, or gender identity.            Thank you!     Thank you for choosing [facility]  for your care. Our goal is always to provide you with excellent care. Hearing back from our patients is one way we can continue to improve our services. Please take a few minutes to complete the written survey that you may receive in the mail after your visit with us. Thank you!             Your Updated Medication List - Protect others around you: Learn how to safely use, store and throw away your medicines at www.disposemymeds.org.          This list is accurate as of 3/12/18  6:05 PM.  Always use your most recent med list.             "       Brand Name Dispense Instructions for use Diagnosis    busPIRone 5 MG tablet    BUSPAR    90 tablet    Take 1 tablet (5 mg) by mouth 2 times daily    Generalized anxiety disorder, Major depressive disorder, single episode, mild (H)       NASACORT AQ NA           oseltamivir 75 MG capsule    TAMIFLU    10 capsule    Take 1 capsule (75 mg) by mouth daily    Flu-like symptoms       polyethylene glycol Packet    MIRALAX/GLYCOLAX     Take 1 packet by mouth daily        * sertraline 50 MG tablet    ZOLOFT    90 tablet    Take 1 tablet (50 mg) by mouth daily    Major depressive disorder, recurrent episode, moderate (H), Generalized anxiety disorder       * sertraline 50 MG tablet    ZOLOFT    90 tablet    Take 1 tablet (50 mg) by mouth daily    Major depressive disorder, single episode, mild (H)       * Notice:  This list has 2 medication(s) that are the same as other medications prescribed for you. Read the directions carefully, and ask your doctor or other care provider to review them with you.

## 2018-03-12 NOTE — PROGRESS NOTES
SUBJECTIVE:  Марина Whitehead is a 38 year old female who presents to the office with the CC of palpitations last night when laying down in bed.  Her heart beat felt fast and irregular.  The palpitations lasted about 20 seconds and she did feel scared/panicky.  No dyspnea. No chest pain.  She also felt faint.  The symptoms resolved after she sat up in bed.  She has had palpitations in the past when pregnant and had full work up and told had minor EKG finding but no other problems.      The patient has been following a low carb diet for the past 2 weeks.  Also, she consumes about 6-8ounce cups of coffee or tea per day.  She slept fine last night and felt normal today.  Denies tobacco or drug use.  did drink 1 light beer 2 nights ago and felt like it went to her head right away.  She has a h/o chronic constipation and takes Miralax daily.  Last stool was today and it was small.  Feels some nausea today.  LMP 2 weeks ago.   Today, around 3 pm while at work, she began to feel antsy, mild lightheadedness and developed a headache. Took her blood pressure with a blood pressure machine and it was 160/80.  After she sat down and drank water and ate some candy, she began to feel better.      Past Medical History:   Diagnosis Date     Degenerative arthritis of lumbar spine      Depressive disorder 2004     NO ACTIVE PROBLEMS          Current Outpatient Prescriptions:      polyethylene glycol (MIRALAX/GLYCOLAX) Packet, Take 1 packet by mouth daily, Disp: , Rfl:      oseltamivir (TAMIFLU) 75 MG capsule, Take 1 capsule (75 mg) by mouth daily (Patient not taking: Reported on 3/12/2018), Disp: 10 capsule, Rfl: 0     sertraline (ZOLOFT) 50 MG tablet, Take 1 tablet (50 mg) by mouth daily (Patient not taking: Reported on 1/22/2018), Disp: 90 tablet, Rfl: 3     busPIRone (BUSPAR) 5 MG tablet, Take 1 tablet (5 mg) by mouth 2 times daily (Patient not taking: Reported on 1/22/2018), Disp: 90 tablet, Rfl: 3     sertraline (ZOLOFT) 50 MG  "tablet, Take 1 tablet (50 mg) by mouth daily (Patient not taking: Reported on 12/1/2017), Disp: 90 tablet, Rfl: 1     Triamcinolone Acetonide (NASACORT AQ NA), , Disp: , Rfl:     Social History   Substance Use Topics     Smoking status: Never Smoker     Smokeless tobacco: Never Used     Alcohol use 1.2 oz/week       ROS:Review of systems negative except as stated above.    EXAM:  /77  Pulse 89  Temp 98.1  F (36.7  C) (Tympanic)  Ht 5' 9\" (1.753 m)  Wt 175 lb (79.4 kg)  SpO2 99%  BMI 25.84 kg/m2  GENERAL APPEARANCE: healthy, alert and no distress  EYES: conjunctiva clear  HENT: ear canals and TM's normal.  Nose and mouth without ulcers, erythema or lesions  NECK: supple, nontender, no lymphadenopathy  RESP: lungs clear to auscultation - no rales, rhonchi or wheezes  CV: regular rates and rhythm, normal S1 S2, no murmur noted  ABDOMEN:  soft, nontender, no HSM or masses and bowel sounds normal  Extremities: no peripheral edema or tenderness, peripheral pulses normal  SKIN: no suspicious lesions or rashes     Office EKG demonstrates:  Sinus Rhythm at 87 bpm, normal axis, short HI interval, no acute ST/T changes c/w ischemia      Labs:   Results for orders placed or performed in visit on 03/12/18   CBC with platelets and differential   Result Value Ref Range    WBC 7.9 4.0 - 11.0 10e9/L    RBC Count 4.54 3.8 - 5.2 10e12/L    Hemoglobin 14.6 11.7 - 15.7 g/dL    Hematocrit 44.6 35.0 - 47.0 %    MCV 98 78 - 100 fl    MCH 32.2 26.5 - 33.0 pg    MCHC 32.7 31.5 - 36.5 g/dL    RDW 12.8 10.0 - 15.0 %    Platelet Count 299 150 - 450 10e9/L    Diff Method Automated Method     % Neutrophils 51.3 %    % Lymphocytes 36.7 %    % Monocytes 7.3 %    % Eosinophils 4.2 %    % Basophils 0.5 %    Absolute Neutrophil 4.0 1.6 - 8.3 10e9/L    Absolute Lymphocytes 2.9 0.8 - 5.3 10e9/L    Absolute Monocytes 0.6 0.0 - 1.3 10e9/L    Absolute Eosinophils 0.3 0.0 - 0.7 10e9/L    Absolute Basophils 0.0 0.0 - 0.2 10e9/L      Assessment  / " IMPRESSION  Palpitations    PLAN: Await pending labs.  Recommend wean off caffeine slowly and to eat small frequent meals and to avoid extreme carb restriction as may cause lower glucose during fasting times.  She is to push fluids.  If palpitations return, she needs to follow-up with PCP for Holter monitoring or call 911 if associated with chest pain or dyspnea.    Fabiana Blandon MD

## 2018-03-13 LAB
ALBUMIN SERPL-MCNC: 4.1 G/DL (ref 3.4–5)
ALP SERPL-CCNC: 58 U/L (ref 40–150)
ALT SERPL W P-5'-P-CCNC: 19 U/L (ref 0–50)
ANION GAP SERPL CALCULATED.3IONS-SCNC: 7 MMOL/L (ref 3–14)
AST SERPL W P-5'-P-CCNC: 26 U/L (ref 0–45)
BILIRUB SERPL-MCNC: 0.3 MG/DL (ref 0.2–1.3)
BUN SERPL-MCNC: 13 MG/DL (ref 7–30)
CALCIUM SERPL-MCNC: 8.8 MG/DL (ref 8.5–10.1)
CHLORIDE SERPL-SCNC: 107 MMOL/L (ref 94–109)
CO2 SERPL-SCNC: 26 MMOL/L (ref 20–32)
CREAT SERPL-MCNC: 0.74 MG/DL (ref 0.52–1.04)
GFR SERPL CREATININE-BSD FRML MDRD: 88 ML/MIN/1.7M2
GLUCOSE SERPL-MCNC: 83 MG/DL (ref 70–99)
POTASSIUM SERPL-SCNC: 4.5 MMOL/L (ref 3.4–5.3)
PROT SERPL-MCNC: 7.3 G/DL (ref 6.8–8.8)
SODIUM SERPL-SCNC: 140 MMOL/L (ref 133–144)
TSH SERPL DL<=0.005 MIU/L-ACNC: 2.71 MU/L (ref 0.4–4)

## 2018-03-16 ENCOUNTER — OFFICE VISIT (OUTPATIENT)
Dept: FAMILY MEDICINE | Facility: CLINIC | Age: 38
End: 2018-03-16
Payer: COMMERCIAL

## 2018-03-16 ENCOUNTER — RADIANT APPOINTMENT (OUTPATIENT)
Dept: GENERAL RADIOLOGY | Facility: CLINIC | Age: 38
End: 2018-03-16
Attending: INTERNAL MEDICINE
Payer: COMMERCIAL

## 2018-03-16 VITALS
HEIGHT: 69 IN | WEIGHT: 175 LBS | SYSTOLIC BLOOD PRESSURE: 112 MMHG | TEMPERATURE: 98 F | HEART RATE: 78 BPM | DIASTOLIC BLOOD PRESSURE: 60 MMHG | BODY MASS INDEX: 25.92 KG/M2 | RESPIRATION RATE: 14 BRPM

## 2018-03-16 DIAGNOSIS — K59.04 CHRONIC IDIOPATHIC CONSTIPATION: ICD-10-CM

## 2018-03-16 DIAGNOSIS — R45.0 FEELING JITTERY: ICD-10-CM

## 2018-03-16 DIAGNOSIS — K59.04 CHRONIC IDIOPATHIC CONSTIPATION: Primary | ICD-10-CM

## 2018-03-16 PROCEDURE — 74019 RADEX ABDOMEN 2 VIEWS: CPT

## 2018-03-16 PROCEDURE — 99214 OFFICE O/P EST MOD 30 MIN: CPT | Performed by: INTERNAL MEDICINE

## 2018-03-16 RX ORDER — BISACODYL 10 MG
10 SUPPOSITORY, RECTAL RECTAL DAILY PRN
Qty: 25 SUPPOSITORY | Refills: 0 | Status: SHIPPED | OUTPATIENT
Start: 2018-03-16 | End: 2021-03-22

## 2018-03-16 RX ORDER — MAGNESIUM CARB/ALUMINUM HYDROX 105-160MG
296 TABLET,CHEWABLE ORAL ONCE
Qty: 3 BOTTLE | Refills: 0 | Status: SHIPPED | OUTPATIENT
Start: 2018-03-16 | End: 2018-03-16

## 2018-03-16 ASSESSMENT — ENCOUNTER SYMPTOMS: NERVOUS/ANXIOUS: 1

## 2018-03-16 NOTE — MR AVS SNAPSHOT
After Visit Summary   3/16/2018    Марина Whitehead    MRN: [id number]           Patient Information     Date Of Birth          1980        Visit Information        Provider Department      3/16/2018 3:00 PM Siobhan Muñoz MD [facility]        Today's Diagnoses     Chronic idiopathic constipation    -  1      Care Instructions    Abdominal xray - large gas bubble and amount of stool & gas.  Does not appear obstructed  Will ask Radiology for quick read     Continue miralax  glyerin helps for hard bowel movements  Also enema    Try gas - x for gas pains  Try stimulant suppository dulcolax  To relieve symptoms, consider 1 bottle magnesium citrate    Pelvic floor appointment next week.    Fluids  Continue healthy eating          Follow-ups after your visit        Who to contact     If you have questions or need follow up information about today's clinic visit or your schedule please contact [facility] directly at [phone].  Normal or non-critical lab and imaging results will be communicated to you by [employer], letter or phone within 4 business days after the clinic has received the results. If you do not hear from us within 7 days, please contact the clinic through [employer] or phone. If you have a critical or abnormal lab result, we will notify you by phone as soon as possible.  Submit refill requests through [employer] or call your pharmacy and they will forward the refill request to us. Please allow 3 business days for your refill to be completed.          Additional Information About Your Visit        MyChart Information     [employer] gives you secure access to your electronic health record. If you see a primary care provider, you can also send messages to your care team and make appointments. If you have questions, please call your primary care clinic.  If you do not have a primary care provider, please call [phone] and they will assist you.       "  Care EveryWhere ID     This is your Care EveryWhere ID. This could be used by other organizations to access your [facility] medical records  [id number]        Your Vitals Were     Pulse Temperature Respirations Height Last Period Breastfeeding?    78 98  F (36.7  C) (Oral) 14 5' 9\" (1.753 m) 02/23/2018 No    BMI (Body Mass Index)                   25.84 kg/m2            Blood Pressure from Last 3 Encounters:   03/16/18 112/60   03/12/18 120/77   01/22/18 103/66    Weight from Last 3 Encounters:   03/16/18 175 lb (79.4 kg)   03/12/18 175 lb (79.4 kg)   12/01/17 172 lb 9.6 oz (78.3 kg)                 Today's Medication Changes          These changes are accurate as of 3/16/18  3:51 PM.  If you have any questions, ask your nurse or doctor.               Start taking these medicines.        Dose/Directions    bisacodyl 10 MG Suppository   Commonly known as:  DULCOLAX   Used for:  Chronic idiopathic constipation   Started by:  Siobhan Muñoz MD        Dose:  10 mg   Place 1 suppository (10 mg) rectally daily as needed for constipation   Quantity:  25 suppository   Refills:  0       magnesium citrate 1.745 GM/30ML solution   Commonly known as:  RA MAGNESIUM CITRATE   Used for:  Chronic idiopathic constipation   Started by:  Siobhan Muñoz MD        Dose:  296 mL   Take 296 mLs by mouth once for 1 dose   Quantity:  3 Bottle   Refills:  0            Where to get your medicines      These medications were sent to [facility] - [place], [place] - [address]  [address], [place] [address]     Phone:  [phone]     bisacodyl 10 MG Suppository    magnesium citrate 1.745 GM/30ML solution                Primary Care Provider Office Phone # Fax #    DAVIN Armstrong [phone]       [address] [place] [address]        Equal Access to Services     THEODORE HUMPHRIES AH: Hadii radha yanceyo Sosandra, waaxda luqadaha, qaybta kaquique gamino, " luigi grayselam diaz'aan ah. So [facility] [phone].    ATENCIÓN: Si lasha esparza, tiene a lamas disposición servicios gratuitos de asistencia lingüística. Stephanie dixon [phone].    We comply with applicable federal civil rights laws and [place] laws. We do not discriminate on the basis of race, color, national origin, age, disability, sex, sexual orientation, or gender identity.            Thank you!     Thank you for choosing [facility]  for your care. Our goal is always to provide you with excellent care. Hearing back from our patients is one way we can continue to improve our services. Please take a few minutes to complete the written survey that you may receive in the mail after your visit with us. Thank you!             Your Updated Medication List - Protect others around you: Learn how to safely use, store and throw away your medicines at www.disposemymeds.org.          This list is accurate as of 3/16/18  3:51 PM.  Always use your most recent med list.                   Brand Name Dispense Instructions for use Diagnosis    bisacodyl 10 MG Suppository    DULCOLAX    25 suppository    Place 1 suppository (10 mg) rectally daily as needed for constipation    Chronic idiopathic constipation       busPIRone 5 MG tablet    BUSPAR    90 tablet    Take 1 tablet (5 mg) by mouth 2 times daily    Generalized anxiety disorder, Major depressive disorder, single episode, mild (H)       magnesium citrate 1.745 GM/30ML solution    RA MAGNESIUM CITRATE    3 Bottle    Take 296 mLs by mouth once for 1 dose    Chronic idiopathic constipation       NASACORT AQ NA           oseltamivir 75 MG capsule    TAMIFLU    10 capsule    Take 1 capsule (75 mg) by mouth daily    Flu-like symptoms       polyethylene glycol Packet    MIRALAX/GLYCOLAX     Take 1 packet by mouth daily        * sertraline 50 MG tablet    ZOLOFT    90 tablet    Take 1 tablet (50 mg) by mouth daily    Major depressive disorder,  recurrent episode, moderate (H), Generalized anxiety disorder       * sertraline 50 MG tablet    ZOLOFT    90 tablet    Take 1 tablet (50 mg) by mouth daily    Major depressive disorder, single episode, mild (H)       * Notice:  This list has 2 medication(s) that are the same as other medications prescribed for you. Read the directions carefully, and ask your doctor or other care provider to review them with you.

## 2018-03-16 NOTE — PATIENT INSTRUCTIONS
Abdominal xray - large gas bubble and amount of stool & gas.  Does not appear obstructed  Will ask Radiology for quick read     Continue miralax  glyerin helps for hard bowel movements  Also enema    Try gas - x for gas pains  Try stimulant suppository dulcolax  To relieve symptoms, consider 1 bottle magnesium citrate    Pelvic floor appointment next week.    Fluids  Continue healthy eating

## 2018-03-16 NOTE — NURSING NOTE
"Chief Complaint   Patient presents with     Constipation     dealing with constipation x 2 weeks, h/o IBS.        Initial /60  Pulse 78  Temp 98  F (36.7  C) (Oral)  Resp 14  Ht 5' 9\" (1.753 m)  Wt 175 lb (79.4 kg)  LMP 02/23/2018  Breastfeeding? No  BMI 25.84 kg/m2 Estimated body mass index is 25.84 kg/(m^2) as calculated from the following:    Height as of this encounter: 5' 9\" (1.753 m).    Weight as of this encounter: 175 lb (79.4 kg).  Medication Reconciliation: complete  "

## 2018-03-16 NOTE — PROGRESS NOTES
SUBJECTIVE:   Марина Whitehead is a 38 year old female presenting with a chief complaint of   Chief Complaint   Patient presents with     Constipation     dealing with constipation x 2 weeks, h/o IBS.        She is an established patient of [facility].  Concerned she has had an impaction  Hx IBS, has GI specialist, healthy colonoscopy few months ago.    Plan - pelvic floor center appt next week - defecalogy testing    No full bowel movement weeks  treatment miralax 2-3 cap/daily,   Fiber diet, low carb diet  Suppository/ glycerin x 2  Enema x1 which was last night.    2 children    Location: generalized   Radiation: None.    Pain character: cramping,       Duration: 2 week(s)   Course of Illness: fluctuating.  Associated Symptoms: constipation, bloating and full.  Female : ,   Surgical History: none      No emesis  decreased appetite      Component      Latest Ref Rng & Units 3/12/2018   WBC      4.0 - 11.0 10e9/L 7.9   RBC Count      3.8 - 5.2 10e12/L 4.54   Hemoglobin      11.7 - 15.7 g/dL 14.6   Hematocrit      35.0 - 47.0 % 44.6   MCV      78 - 100 fl 98   MCH      26.5 - 33.0 pg 32.2   MCHC      31.5 - 36.5 g/dL 32.7   RDW      10.0 - 15.0 % 12.8   Platelet Count      150 - 450 10e9/L 299   Diff Method       Automated Method   % Neutrophils      % 51.3   % Lymphocytes      % 36.7   % Monocytes      % 7.3   % Eosinophils      % 4.2   % Basophils      % 0.5   Absolute Neutrophil      1.6 - 8.3 10e9/L 4.0   Absolute Lymphocytes      0.8 - 5.3 10e9/L 2.9   Absolute Monocytes      0.0 - 1.3 10e9/L 0.6   Absolute Eosinophils      0.0 - 0.7 10e9/L 0.3   Absolute Basophils      0.0 - 0.2 10e9/L 0.0   Sodium      133 - 144 mmol/L 140   Potassium      3.4 - 5.3 mmol/L 4.5   Chloride      94 - 109 mmol/L 107   Carbon Dioxide      20 - 32 mmol/L 26   Anion Gap      3 - 14 mmol/L 7   Glucose      70 - 99 mg/dL 83   Urea Nitrogen      7 - 30 mg/dL 13   Creatinine      0.52 - 1.04 mg/dL 0.74   GFR Estimate      >60  mL/min/1.7m2 88   GFR Estimate If Black      >60 mL/min/1.7m2 >90   Calcium      8.5 - 10.1 mg/dL 8.8   Bilirubin Total      0.2 - 1.3 mg/dL 0.3   Albumin      3.4 - 5.0 g/dL 4.1   Protein Total      6.8 - 8.8 g/dL 7.3   Alkaline Phosphatase      40 - 150 U/L 58   ALT      0 - 50 U/L 19   AST      0 - 45 U/L 26   TSH      0.40 - 4.00 mU/L 2.71         Review of Systems   Psychiatric/Behavioral: The patient is nervous/anxious.      Seen this week for palpitations.  She admits to increasing her caffeine intake  This week she has been trying to decrease   Still having at least 2 cups in the morning coffee    Past Medical History:   Diagnosis Date     Degenerative arthritis of lumbar spine      Depressive disorder 2004     NO ACTIVE PROBLEMS      Family History   Problem Relation Age of Onset     Hyperlipidemia Mother      Hyperlipidemia Father      Hypertension Father      Coronary Artery Disease Father      Blood Disease Maternal Grandfather      polycythemia vera     Colon Cancer Paternal Grandmother      HEART DISEASE Paternal Grandfather      Current Outpatient Prescriptions   Medication Sig Dispense Refill     magnesium citrate (RA MAGNESIUM CITRATE) 1.745 GM/30ML solution Take 296 mLs by mouth once for 1 dose 3 Bottle 0     bisacodyl (DULCOLAX) 10 MG Suppository Place 1 suppository (10 mg) rectally daily as needed for constipation 25 suppository 0     polyethylene glycol (MIRALAX/GLYCOLAX) Packet Take 1 packet by mouth daily       oseltamivir (TAMIFLU) 75 MG capsule Take 1 capsule (75 mg) by mouth daily (Patient not taking: Reported on 3/12/2018) 10 capsule 0     sertraline (ZOLOFT) 50 MG tablet Take 1 tablet (50 mg) by mouth daily (Patient not taking: Reported on 1/22/2018) 90 tablet 3     busPIRone (BUSPAR) 5 MG tablet Take 1 tablet (5 mg) by mouth 2 times daily (Patient not taking: Reported on 1/22/2018) 90 tablet 3     sertraline (ZOLOFT) 50 MG tablet Take 1 tablet (50 mg) by mouth daily (Patient not taking:  "Reported on 12/1/2017) 90 tablet 1     Triamcinolone Acetonide (NASACORT AQ NA)        Social History   Substance Use Topics     Smoking status: Never Smoker     Smokeless tobacco: Never Used     Alcohol use 1.2 oz/week       OBJECTIVE  /60  Pulse 78  Temp 98  F (36.7  C) (Oral)  Resp 14  Ht 5' 9\" (1.753 m)  Wt 175 lb (79.4 kg)  LMP 02/23/2018  Breastfeeding? No  BMI 25.84 kg/m2    Physical Exam   Constitutional: She appears well-developed and well-nourished.   Cardiovascular: Normal rate, regular rhythm, normal heart sounds and intact distal pulses.    Pulmonary/Chest: Effort normal and breath sounds normal.   Abdominal: Soft. She exhibits no mass. There is tenderness. There is no rebound and no guarding.   Mild diffuse abd pain, greater in RLQ         Labs:  No results found for this or any previous visit (from the past 24 hour(s)).    X-Ray was done, my findings are: large gas bubble and amount of stool & gas.  Does not appear obstructed      ASSESSMENT:      ICD-10-CM    1. Chronic idiopathic constipation K59.04 XR Abdomen 2 Views     magnesium citrate (RA MAGNESIUM CITRATE) 1.745 GM/30ML solution     bisacodyl (DULCOLAX) 10 MG Suppository   2. Feeling jittery R45.0         Medical Decision Making:    Differential Diagnosis:  Due to ongoing symptoms of constipation without relief from treatments, plan was to check x-ray to review amount of stool and to see if this is causing significant amount of her symptoms.  Patient is also concerned about obstruction although discussed she did not have symptoms of obstruction and she is not having vomiting and she is still passing gas and stool.  She definitely appears to have full slow transit constipation related to her irritable bowel syndrome.  Hopefully  Pelvic floor clinic will have more suggestions for patient besides the ones I suggested below.  Due to her discomfort and amount of stool noted on x-ray I recommended her to try the magnesium citrate.  We " will also try Dulcolax suppositories to see if that her have bowel movements.  She should follow-up with her GI specialist for further recommendations and care in addition to the pelvic floor center.        Patient Instructions   Abdominal xray - large gas bubble and amount of stool & gas.  Does not appear obstructed  Will ask Radiology for quick read     Continue miralax  glyerin helps for hard bowel movements  Also enema    Try gas - x for gas pains  Try stimulant suppository dulcolax  To relieve symptoms, consider 1 bottle magnesium citrate    Pelvic floor appointment next week.    Fluids  Continue healthy eating      Switch to 1/2 caf for jitteriness

## 2018-03-21 ENCOUNTER — TRANSFERRED RECORDS (OUTPATIENT)
Dept: HEALTH INFORMATION MANAGEMENT | Facility: CLINIC | Age: 38
End: 2018-03-21

## 2018-05-14 ENCOUNTER — HEALTH MAINTENANCE LETTER (OUTPATIENT)
Age: 38
End: 2018-05-14

## 2018-07-25 ENCOUNTER — OFFICE VISIT (OUTPATIENT)
Dept: FAMILY MEDICINE | Facility: CLINIC | Age: 38
End: 2018-07-25
Payer: COMMERCIAL

## 2018-07-25 ENCOUNTER — TELEPHONE (OUTPATIENT)
Dept: FAMILY MEDICINE | Facility: CLINIC | Age: 38
End: 2018-07-25

## 2018-07-25 VITALS
BODY MASS INDEX: 25.48 KG/M2 | HEART RATE: 70 BPM | SYSTOLIC BLOOD PRESSURE: 114 MMHG | HEIGHT: 69 IN | RESPIRATION RATE: 14 BRPM | DIASTOLIC BLOOD PRESSURE: 72 MMHG | TEMPERATURE: 98.6 F | WEIGHT: 172 LBS

## 2018-07-25 DIAGNOSIS — S80.12XA HEMATOMA OF LEG, LEFT, INITIAL ENCOUNTER: Primary | ICD-10-CM

## 2018-07-25 DIAGNOSIS — F32.5 MAJOR DEPRESSION IN COMPLETE REMISSION (H): ICD-10-CM

## 2018-07-25 PROCEDURE — 99214 OFFICE O/P EST MOD 30 MIN: CPT | Performed by: INTERNAL MEDICINE

## 2018-07-25 NOTE — TELEPHONE ENCOUNTER
"S-(situation):  Two weeks ago had injury to her leg. Got it pinched between 2 parts of her bed and had really bad bruising in calf. Bruising still bad and \"I am feeling some bumps there still\" She is wondering if she injured her vein and worried about clots. Underneath the bruise she feels \"some injury to the vein\" lump is pea size. Area tender but pain not getting worse. Leg is  but only when touched. No swelling in the leg. Rates pain as 1 on 1-10 scale just to touch.     B-(background): no hx of clots herself  Recent clot in cousin her age - this is what got her thinking about clot  No hx of clots in immediate family  Non smoker  No current use of OCP or hormones  No recent periods of immobility  No shortness of breath or chest pain    A-(assessment): low probability of clot in leg but not able to guarantee this by phone    R-(recommendations): advised appt and pt did accept appt today at 3:45 in clinic    Esmer Baumann RN, BSN         "

## 2018-07-25 NOTE — PROGRESS NOTES
SUBJECTIVE:   Марина Whitehead is a 38 year old female presenting with a chief complaint of   Chief Complaint   Patient presents with     Musculoskeletal Problem     bruise to calf 2 1/2  weeks ago, has a small bump.      Depression     started taking zoloft again and would like to discuss this a little.        She is an established patient of [facility].        Got leg stuck in between 2 wooden slats of railing of bed 2.5 weeks ago  Very painful injury  No treatment   Concerned about lump there  Pain is mostly gone, unless bumped    Her cousin hospitalized for blood clots    non-smoker  Not on OCP    Patient complains of depressive symptoms. Onset approximately 6 month(s) ago, gradually worsening since that time.   This occurred after going off zoloft per GI suggestion to see if causing stomach symptoms     Restarted zoloft 50 mg 2-3 weeks ago  This has helped depression symptoms     Current symptoms include obsessive thoughts, feelings of guilt., felt bad      Previous treatment modalities employed include medication(s) Zoloft (sertraline).         Review of Systems    Past Medical History:   Diagnosis Date     Degenerative arthritis of lumbar spine      Depressive disorder 2004     NO ACTIVE PROBLEMS      Family History   Problem Relation Age of Onset     Hyperlipidemia Mother      Hyperlipidemia Father      Hypertension Father      Coronary Artery Disease Father      Blood Disease Maternal Grandfather      polycythemia vera     Colon Cancer Paternal Grandmother      HEART DISEASE Paternal Grandfather      Current Outpatient Prescriptions   Medication Sig Dispense Refill     sertraline (ZOLOFT) 50 MG tablet Take 1 tablet (50 mg) by mouth daily 90 tablet 1     sertraline (ZOLOFT) 50 MG tablet Take 1 tablet (50 mg) by mouth daily 90 tablet 1     bisacodyl (DULCOLAX) 10 MG Suppository Place 1 suppository (10 mg) rectally daily as needed for constipation 25 suppository 0     busPIRone (BUSPAR) 5 MG tablet Take 1 tablet (5  "mg) by mouth 2 times daily (Patient not taking: Reported on 1/22/2018) 90 tablet 3     oseltamivir (TAMIFLU) 75 MG capsule Take 1 capsule (75 mg) by mouth daily (Patient not taking: Reported on 3/12/2018) 10 capsule 0     polyethylene glycol (MIRALAX/GLYCOLAX) Packet Take 1 packet by mouth daily       sertraline (ZOLOFT) 50 MG tablet Take 1 tablet (50 mg) by mouth daily (Patient not taking: Reported on 1/22/2018) 90 tablet 3     Triamcinolone Acetonide (NASACORT AQ NA)        Social History   Substance Use Topics     Smoking status: Never Smoker     Smokeless tobacco: Never Used     Alcohol use 1.2 oz/week       OBJECTIVE  /72  Pulse 70  Temp 98.6  F (37  C) (Oral)  Resp 14  Ht 5' 9\" (1.753 m)  Wt 172 lb (78 kg)  LMP 07/25/2018  Breastfeeding? No  BMI 25.4 kg/m2    Physical Exam   Constitutional: She appears well-developed and well-nourished.   Musculoskeletal:   left medial mid calf area  2.5 x 1 cm painful mass with some evolving ecchymosis just inferior to area       Labs:  No results found for this or any previous visit (from the past 24 hour(s)).        ASSESSMENT:      ICD-10-CM    1. Hematoma of leg, left, initial encounter S80.12XA     traumatic due to contusion/injury   2. Major depression in complete remission (H) F32.5 sertraline (ZOLOFT) 50 MG tablet          Medical Decision Making:    PLAN:      Patient Instructions   May alternate with hot & cool compress  Gentle massage to help get rid of hematoma  May have residual scar tissue    Depression symptoms improved with restarting zoloft  6 month refill    Call or return to clinic if symptoms worsen or fail to improve as anticipated.              "

## 2018-07-25 NOTE — PATIENT INSTRUCTIONS
May alternate with hot & cool compress  Gentle massage to help get rid of hematoma  May have residual scar tissue    Depression symptoms improved with restarting zoloft  6 month refill    Call or return to clinic if symptoms worsen or fail to improve as anticipated.

## 2018-07-25 NOTE — MR AVS SNAPSHOT
After Visit Summary   7/25/2018    Марина Whitehead    MRN: [id number]           Patient Information     Date Of Birth          1980        Visit Information        Provider Department      7/25/2018 3:45 PM Siobhan Muñoz MD [facility]        Today's Diagnoses     Hematoma of leg, left, initial encounter    -  1    Major depression in complete remission (H)          Care Instructions    May alternate with hot & cool compress  Gentle massage to help get rid of hematoma  May have residual scar tissue    Depression symptoms improved with restarting zoloft  6 month refill    Call or return to clinic if symptoms worsen or fail to improve as anticipated.                Follow-ups after your visit        Who to contact     If you have questions or need follow up information about today's clinic visit or your schedule please contact [facility] directly at [phone].  Normal or non-critical lab and imaging results will be communicated to you by [employer] Exchange[employer], letter or phone within 4 business days after the clinic has received the results. If you do not hear from us within 7 days, please contact the clinic through [employer] or phone. If you have a critical or abnormal lab result, we will notify you by phone as soon as possible.  Submit refill requests through Apprema or call your pharmacy and they will forward the refill request to us. Please allow 3 business days for your refill to be completed.          Additional Information About Your Visit        MyChart Information     Apprema gives you secure access to your electronic health record. If you see a primary care provider, you can also send messages to your care team and make appointments. If you have questions, please call your primary care clinic.  If you do not have a primary care provider, please call [phone] and they will assist you.        Care EveryWhere ID     This is your Care EveryWhere ID. This  "could be used by other organizations to access your [place] medical records  [id number]        Your Vitals Were     Pulse Temperature Respirations Height Last Period Breastfeeding?    70 98.6  F (37  C) (Oral) 14 5' 9\" (1.753 m) 07/25/2018 No    BMI (Body Mass Index)                   25.4 kg/m2            Blood Pressure from Last 3 Encounters:   07/25/18 114/72   03/16/18 112/60   03/12/18 120/77    Weight from Last 3 Encounters:   07/25/18 172 lb (78 kg)   03/16/18 175 lb (79.4 kg)   03/12/18 175 lb (79.4 kg)              Today, you had the following     No orders found for display         Today's Medication Changes          These changes are accurate as of 7/25/18  4:31 PM.  If you have any questions, ask your nurse or doctor.               These medicines have changed or have updated prescriptions.        Dose/Directions    * sertraline 50 MG tablet   Commonly known as:  ZOLOFT   This may have changed:  Another medication with the same name was added. Make sure you understand how and when to take each.   Used for:  Major depressive disorder, recurrent episode, moderate (H), Generalized anxiety disorder   Changed by:  Siobhan Muñoz MD        Dose:  50 mg   Take 1 tablet (50 mg) by mouth daily   Quantity:  90 tablet   Refills:  1       * sertraline 50 MG tablet   Commonly known as:  ZOLOFT   This may have changed:  Another medication with the same name was added. Make sure you understand how and when to take each.   Used for:  Major depressive disorder, single episode, mild (H)   Changed by:  Siobhan Muñoz MD        Dose:  50 mg   Take 1 tablet (50 mg) by mouth daily   Quantity:  90 tablet   Refills:  3       * sertraline 50 MG tablet   Commonly known as:  ZOLOFT   This may have changed:  You were already taking a medication with the same name, and this prescription was added. Make sure you understand how and when to take each.   Used for:  Major depression in complete remission (H) "   Changed by:  Siobhan Muñoz MD        Dose:  50 mg   Take 1 tablet (50 mg) by mouth daily   Quantity:  90 tablet   Refills:  1       * Notice:  This list has 3 medication(s) that are the same as other medications prescribed for you. Read the directions carefully, and ask your doctor or other care provider to review them with you.         Where to get your medicines      These medications were sent to [id number] - [place], [place] - [address] AT [facility] [address]  [address], [place] [address]     Phone:  [phone]     sertraline 50 MG tablet                Primary Care Provider Office Phone # Fax #    Heydi DAVIN Sánchez [facility] [phone] [address] [place] [address]        Equal Access to Services     [facility] : Hadii radha case hadasho Soomaali, waaxda luqadaha, qaybta kaalmada adeegyada, luigi james hayrakel amin . So [facility] [phone].    ATENCIÓN: Si habla español, tiene a lamas disposición servicios gratuitos de asistencia lingüística. Llame al [phone].    We comply with applicable federal civil rights laws and [place] laws. We do not discriminate on the basis of race, color, national origin, age, disability, sex, sexual orientation, or gender identity.            Thank you!     Thank you for choosing [facility]  for your care. Our goal is always to provide you with excellent care. Hearing back from our patients is one way we can continue to improve our services. Please take a few minutes to complete the written survey that you may receive in the mail after your visit with us. Thank you!             Your Updated Medication List - Protect others around you: Learn how to safely use, store and throw away your medicines at www.disposemymeds.org.          This list is accurate as of 7/25/18  4:31 PM.  Always use your most recent med list.                   Brand Name Dispense Instructions  for use Diagnosis    bisacodyl 10 MG Suppository    DULCOLAX    25 suppository    Place 1 suppository (10 mg) rectally daily as needed for constipation    Chronic idiopathic constipation       busPIRone 5 MG tablet    BUSPAR    90 tablet    Take 1 tablet (5 mg) by mouth 2 times daily    Generalized anxiety disorder, Major depressive disorder, single episode, mild (H)       NASACORT AQ NA           oseltamivir 75 MG capsule    TAMIFLU    10 capsule    Take 1 capsule (75 mg) by mouth daily    Flu-like symptoms       polyethylene glycol Packet    MIRALAX/GLYCOLAX     Take 1 packet by mouth daily        * sertraline 50 MG tablet    ZOLOFT    90 tablet    Take 1 tablet (50 mg) by mouth daily    Major depressive disorder, recurrent episode, moderate (H), Generalized anxiety disorder       * sertraline 50 MG tablet    ZOLOFT    90 tablet    Take 1 tablet (50 mg) by mouth daily    Major depressive disorder, single episode, mild (H)       * sertraline 50 MG tablet    ZOLOFT    90 tablet    Take 1 tablet (50 mg) by mouth daily    Major depression in complete remission (H)       * Notice:  This list has 3 medication(s) that are the same as other medications prescribed for you. Read the directions carefully, and ask your doctor or other care provider to review them with you.

## 2018-07-26 ASSESSMENT — PATIENT HEALTH QUESTIONNAIRE - PHQ9: SUM OF ALL RESPONSES TO PHQ QUESTIONS 1-9: 4

## 2018-09-09 ENCOUNTER — OFFICE VISIT (OUTPATIENT)
Dept: URGENT CARE | Facility: URGENT CARE | Age: 38
End: 2018-09-09
Payer: COMMERCIAL

## 2018-09-09 VITALS
WEIGHT: 170 LBS | OXYGEN SATURATION: 98 % | HEART RATE: 74 BPM | HEIGHT: 69 IN | SYSTOLIC BLOOD PRESSURE: 105 MMHG | DIASTOLIC BLOOD PRESSURE: 72 MMHG | TEMPERATURE: 97.6 F | BODY MASS INDEX: 25.18 KG/M2

## 2018-09-09 DIAGNOSIS — J06.9 VIRAL URI: Primary | ICD-10-CM

## 2018-09-09 DIAGNOSIS — H65.92 MIDDLE EAR EFFUSION, LEFT: ICD-10-CM

## 2018-09-09 PROCEDURE — 99213 OFFICE O/P EST LOW 20 MIN: CPT | Performed by: PHYSICIAN ASSISTANT

## 2018-09-09 RX ORDER — FLUTICASONE PROPIONATE 50 MCG
1 SPRAY, SUSPENSION (ML) NASAL DAILY
COMMUNITY
End: 2020-02-18

## 2018-09-09 RX ORDER — PREDNISONE 20 MG/1
40 TABLET ORAL DAILY
Qty: 10 TABLET | Refills: 0 | Status: SHIPPED | OUTPATIENT
Start: 2018-09-09 | End: 2019-03-04

## 2018-09-09 ASSESSMENT — ENCOUNTER SYMPTOMS
NAUSEA: 0
FEVER: 0
SORE THROAT: 1
FOCAL WEAKNESS: 0
SHORTNESS OF BREATH: 0
HEADACHES: 0
CHILLS: 0
SINUS PAIN: 0
COUGH: 1
DIARRHEA: 0
ABDOMINAL PAIN: 0
VOMITING: 0

## 2018-09-09 NOTE — MR AVS SNAPSHOT
"              After Visit Summary   9/9/2018    Марина Whitehead    MRN: [id number]           Patient Information     Date Of Birth          1980        Visit Information        Provider Department      9/9/2018 4:30 PM Kacey Chaney PA-C [facility]        Today's Diagnoses     Viral URI    -  1    Middle ear effusion, left           Follow-ups after your visit        Follow-up notes from your care team     Return if symptoms worsen or fail to improve.      Who to contact     If you have questions or need follow up information about today's clinic visit or your schedule please contact [facility] directly at [phone].  Normal or non-critical lab and imaging results will be communicated to you by MyChart, letter or phone within 4 business days after the clinic has received the results. If you do not hear from us within 7 days, please contact the clinic through [employer] or phone. If you have a critical or abnormal lab result, we will notify you by phone as soon as possible.  Submit refill requests through Navita or call your pharmacy and they will forward the refill request to us. Please allow 3 business days for your refill to be completed.          Additional Information About Your Visit        MyChart Information     Navita gives you secure access to your electronic health record. If you see a primary care provider, you can also send messages to your care team and make appointments. If you have questions, please call your primary care clinic.  If you do not have a primary care provider, please call [phone] and they will assist you.        Care EveryWhere ID     This is your Care EveryWhere ID. This could be used by other organizations to access your [place] medical records  [id number]        Your Vitals Were     Pulse Temperature Height Pulse Oximetry BMI (Body Mass Index)       74 97.6  F (36.4  C) (Tympanic) 5' 9\" (1.753 m) 98% 25.1 kg/m2     "    Blood Pressure from Last 3 Encounters:   09/09/18 105/72   07/25/18 114/72   03/16/18 112/60    Weight from Last 3 Encounters:   09/09/18 170 lb (77.1 kg)   07/25/18 172 lb (78 kg)   03/16/18 175 lb (79.4 kg)              Today, you had the following     No orders found for display         Today's Medication Changes          These changes are accurate as of 9/9/18  5:03 PM.  If you have any questions, ask your nurse or doctor.               Start taking these medicines.        Dose/Directions    predniSONE 20 MG tablet   Commonly known as:  DELTASONE   Used for:  Middle ear effusion, left   Started by:  Kacey Chaney PA-C        Dose:  40 mg   Take 2 tablets (40 mg) by mouth daily for 5 days   Quantity:  10 tablet   Refills:  0            Where to get your medicines      These medications were sent to [id number] - [place], [place] - [address] [facility]  [address], [place] [address]     Phone:  [phone]     predniSONE 20 MG tablet                Primary Care Provider Office Phone # Fax #    Heydi S Luke, APRN [facility] [phone]       [facility] [place] [address]        Equal Access to Services     THEODORE HUMPHRIES AH: Hadii radha ku hadasho Soomaali, waaxda luqadaha, qaybta kaalmada adeegyada, luigi hahnin hayaahemant amin . So [facility] [phone].    ATENCIÓN: Si habla español, tiene a lamas disposición servicios gratuitos de asistencia lingüística. Isaame al [phone].    We comply with applicable federal civil rights laws and [place] laws. We do not discriminate on the basis of race, color, national origin, age, disability, sex, sexual orientation, or gender identity.            Thank you!     Thank you for choosing [facility]  for your care. Our goal is always to provide you with excellent care. Hearing back from our patients is one way we can continue to improve our services. Please take a  few minutes to complete the written survey that you may receive in the mail after your visit with us. Thank you!             Your Updated Medication List - Protect others around you: Learn how to safely use, store and throw away your medicines at www.disposemymeds.org.          This list is accurate as of 9/9/18  5:03 PM.  Always use your most recent med list.                   Brand Name Dispense Instructions for use Diagnosis    bisacodyl 10 MG Suppository    DULCOLAX    25 suppository    Place 1 suppository (10 mg) rectally daily as needed for constipation    Chronic idiopathic constipation       busPIRone 5 MG tablet    BUSPAR    90 tablet    Take 1 tablet (5 mg) by mouth 2 times daily    Generalized anxiety disorder, Major depressive disorder, single episode, mild (H)       dextromethorphan-guaiFENesin  MG per 12 hr tablet    MUCINEX DM     Take 1 tablet by mouth every 12 hours        fluticasone 50 MCG/ACT spray    FLONASE     Spray 1 spray into both nostrils daily        NASACORT AQ NA           oseltamivir 75 MG capsule    TAMIFLU    10 capsule    Take 1 capsule (75 mg) by mouth daily    Flu-like symptoms       polyethylene glycol Packet    MIRALAX/GLYCOLAX     Take 1 packet by mouth daily        predniSONE 20 MG tablet    DELTASONE    10 tablet    Take 2 tablets (40 mg) by mouth daily for 5 days    Middle ear effusion, left       * sertraline 50 MG tablet    ZOLOFT    90 tablet    Take 1 tablet (50 mg) by mouth daily    Major depressive disorder, recurrent episode, moderate (H), Generalized anxiety disorder       * sertraline 50 MG tablet    ZOLOFT    90 tablet    Take 1 tablet (50 mg) by mouth daily    Major depressive disorder, single episode, mild (H)       * sertraline 50 MG tablet    ZOLOFT    90 tablet    Take 1 tablet (50 mg) by mouth daily    Major depression in complete remission (H)       * Notice:  This list has 3 medication(s) that are the same as other medications prescribed for you. Read  the directions carefully, and ask your doctor or other care provider to review them with you.

## 2018-09-09 NOTE — PROGRESS NOTES
HPI  2018    HPI: Марина Whitehead is a 38 year old female who complains of moderate L ear pressure, nasal congestion, cough, & fatigue onset 2 weeks ago. Pt has also noticed some erythema to her L cheek off & on; it is not tender or itchy. Symptoms are constant in duration. She is taking Flonase, Mucinex, and zinc. Denies ear drainage, sore throat, fever/chills, HA, CP, SOB, abd pain, N/V/D, rash, or any other symptoms. Patient's family has been sick with similar symptoms.  Pt had frequent sinusitis last year and was seen by ENT who felt that she wasn't truly having sinus infections but that the pain was due to teeth grinding. She was referred for a mouth guard and this has helped.    Past Medical History:   Diagnosis Date     Degenerative arthritis of lumbar spine      Depressive disorder      NO ACTIVE PROBLEMS      Past Surgical History:   Procedure Laterality Date      SECTION  03/19/10     Social History   Substance Use Topics     Smoking status: Never Smoker     Smokeless tobacco: Never Used     Alcohol use 1.2 oz/week     Patient Active Problem List   Diagnosis     Degenerative arthritis of lumbar spine     Chronic SI joint pain     Obesity     Generalized anxiety disorder     Major depressive disorder, single episode, mild (H)     Irritable bowel syndrome with diarrhea     Family history of polycythemia vera     Family History   Problem Relation Age of Onset     Hyperlipidemia Mother      Hyperlipidemia Father      Hypertension Father      Coronary Artery Disease Father      Blood Disease Maternal Grandfather      polycythemia vera     Colon Cancer Paternal Grandmother      HEART DISEASE Paternal Grandfather         Problem list, Medication list, Allergies, and Medical/Social/Surgical histories reviewed in [facility] and updated as appropriate.      Review of Systems   Constitutional: Positive for malaise/fatigue. Negative for chills and fever.   HENT: Positive for congestion and sore  "throat. Negative for ear pain and sinus pain.         Ear pressure   Respiratory: Positive for cough. Negative for shortness of breath.    Cardiovascular: Negative for chest pain.   Gastrointestinal: Negative for abdominal pain, diarrhea, nausea and vomiting.   Skin: Negative for rash.   Neurological: Negative for focal weakness and headaches.   All other systems reviewed and are negative.      Physical Exam   Constitutional: She is oriented to person, place, and time and well-developed, well-nourished, and in no distress.   HENT:   Head: Normocephalic and atraumatic.       Right Ear: Tympanic membrane, external ear and ear canal normal.   Left Ear: External ear and ear canal normal. No drainage, swelling or tenderness. Tympanic membrane is not erythematous. A middle ear effusion is present.   Nose: Mucosal edema present. No sinus tenderness.   Mouth/Throat: Uvula is midline, oropharynx is clear and moist and mucous membranes are normal.   Cardiovascular: Normal rate, regular rhythm and normal heart sounds.    Pulmonary/Chest: Effort normal and breath sounds normal.   Musculoskeletal: Normal range of motion.   Neurological: She is alert and oriented to person, place, and time. Gait normal.   Skin: Skin is warm and dry.   Nursing note and vitals reviewed.      Vital Signs  /72  Pulse 74  Temp 97.6  F (36.4  C) (Tympanic)  Ht 5' 9\" (1.753 m)  Wt 170 lb (77.1 kg)  SpO2 98%  BMI 25.1 kg/m2     Diagnostic Test Results:  none     ASSESSMENT/PLAN      ICD-10-CM    1. Viral URI J06.9     B97.89    2. Middle ear effusion, left H65.92 predniSONE (DELTASONE) 20 MG tablet      Lungs CTAB, no sinus tenderness or pain, no otitis media. Left middle ear effusion. Mild erythema to L cheek of uncertain etiology but does not appear to be c/w cellulitis. Continue Flonase & Mucinex.  Prednisone Rx sent to pharmacy- take if symptoms not improving.      I have discussed any lab or imaging results, the patient's diagnosis, and " my plan of treatment with the patient and/or family. Patient is aware to come back in if with worsening symptoms or if no relief despite treatment plan.  Patient voiced understanding and had no further questions.       Follow Up: Return if symptoms worsen or fail to improve.    NACHO Figueroa, PA-C  [facility]

## 2018-10-15 ENCOUNTER — ALLIED HEALTH/NURSE VISIT (OUTPATIENT)
Dept: NURSING | Facility: CLINIC | Age: 38
End: 2018-10-15
Payer: COMMERCIAL

## 2018-10-15 DIAGNOSIS — Z23 NEED FOR PROPHYLACTIC VACCINATION AND INOCULATION AGAINST INFLUENZA: Primary | ICD-10-CM

## 2018-10-15 PROCEDURE — 99207 ZZC NO CHARGE NURSE ONLY: CPT

## 2018-10-15 PROCEDURE — 90471 IMMUNIZATION ADMIN: CPT

## 2018-10-15 PROCEDURE — 90686 IIV4 VACC NO PRSV 0.5 ML IM: CPT

## 2018-10-15 NOTE — PROGRESS NOTES

## 2018-10-15 NOTE — MR AVS SNAPSHOT
After Visit Summary   10/15/2018    Марина Whitehead    MRN: [id number]           Patient Information     Date Of Birth          1980        Visit Information        Provider Department      10/15/2018 2:15 PM CARE COORDINATOR FC [facility]        Today's Diagnoses     Need for prophylactic vaccination and inoculation against influenza    -  1       Follow-ups after your visit        Your next 10 appointments already scheduled     Oct 20, 2018  1:25 PM CDT   Nurse Only with [facility]   [facility] ([facility]    [address] [address] [place] [address] [phone]              Who to contact     If you have questions or need follow up information about today's clinic visit or your schedule please contact [facility] directly at [phone].  Normal or non-critical lab and imaging results will be communicated to you by MyChart, letter or phone within 4 business days after the clinic has received the results. If you do not hear from us within 7 days, please contact the clinic through MyChart or phone. If you have a critical or abnormal lab result, we will notify you by phone as soon as possible.  Submit refill requests through BitStash or call your pharmacy and they will forward the refill request to us. Please allow 3 business days for your refill to be completed.          Additional Information About Your Visit        MyChart Information     [employer] gives you secure access to your electronic health record. If you see a primary care provider, you can also send messages to your care team and make appointments. If you have questions, please call your primary care clinic.  If you do not have a primary care provider, please call [phone] and they will assist you.        Care EveryWhere ID     This is your Care EveryWhere ID. This could be used by other organizations to  access your [place] medical records  [id number]         Blood Pressure from Last 3 Encounters:   09/09/18 105/72   07/25/18 114/72   03/16/18 112/60    Weight from Last 3 Encounters:   09/09/18 170 lb (77.1 kg)   07/25/18 172 lb (78 kg)   03/16/18 175 lb (79.4 kg)              We Performed the Following     FLU VACCINE, SPLIT VIRUS, IM (QUADRIVALENT) [32659]- >3 YRS     Vaccine Administration, Initial [34853]        Primary Care Provider Office Phone # Fax #    Heydi MendozanbDAVIN ny TONI [phone] [address] [place] [address]        Equal Access to Services     THEODORE HUMPHRIES : Drew yanceyo Soomaali, waaxda luqadaha, qaybta kaalmada adeegyada, luigi amin . So [facility] [phone].    ATENCIÓN: Si habla español, tiene a lamas disposición servicios gratuitos de asistencia lingüística. Llame al [phone].    We comply with applicable federal civil rights laws and [place] laws. We do not discriminate on the basis of race, color, national origin, age, disability, sex, sexual orientation, or gender identity.            Thank you!     Thank you for choosing [facility]  for your care. Our goal is always to provide you with excellent care. Hearing back from our patients is one way we can continue to improve our services. Please take a few minutes to complete the written survey that you may receive in the mail after your visit with us. Thank you!             Your Updated Medication List - Protect others around you: Learn how to safely use, store and throw away your medicines at www.disposemymeds.org.          This list is accurate as of 10/15/18  2:16 PM.  Always use your most recent med list.                   Brand Name Dispense Instructions for use Diagnosis    bisacodyl 10 MG Suppository    DULCOLAX    25 suppository    Place 1 suppository (10 mg) rectally daily as needed for constipation    Chronic idiopathic  constipation       busPIRone 5 MG tablet    BUSPAR    90 tablet    Take 1 tablet (5 mg) by mouth 2 times daily    Generalized anxiety disorder, Major depressive disorder, single episode, mild (H)       dextromethorphan-guaiFENesin  MG per 12 hr tablet    MUCINEX DM     Take 1 tablet by mouth every 12 hours        fluticasone 50 MCG/ACT spray    FLONASE     Spray 1 spray into both nostrils daily        NASACORT AQ NA           oseltamivir 75 MG capsule    TAMIFLU    10 capsule    Take 1 capsule (75 mg) by mouth daily    Flu-like symptoms       polyethylene glycol Packet    MIRALAX/GLYCOLAX     Take 1 packet by mouth daily        * sertraline 50 MG tablet    ZOLOFT    90 tablet    Take 1 tablet (50 mg) by mouth daily    Major depressive disorder, recurrent episode, moderate (H), Generalized anxiety disorder       * sertraline 50 MG tablet    ZOLOFT    90 tablet    Take 1 tablet (50 mg) by mouth daily    Major depressive disorder, single episode, mild (H)       * sertraline 50 MG tablet    ZOLOFT    90 tablet    Take 1 tablet (50 mg) by mouth daily    Major depression in complete remission (H)       * Notice:  This list has 3 medication(s) that are the same as other medications prescribed for you. Read the directions carefully, and ask your doctor or other care provider to review them with you.

## 2018-11-26 ENCOUNTER — MYC MEDICAL ADVICE (OUTPATIENT)
Dept: FAMILY MEDICINE | Facility: CLINIC | Age: 38
End: 2018-11-26

## 2018-11-26 DIAGNOSIS — F32.0 MAJOR DEPRESSIVE DISORDER, SINGLE EPISODE, MILD (H): Primary | ICD-10-CM

## 2018-11-26 DIAGNOSIS — F41.1 GENERALIZED ANXIETY DISORDER: ICD-10-CM

## 2018-11-26 NOTE — TELEPHONE ENCOUNTER
Heydi Butler, APRN CNP    Please see my chart message from patient -   Requesting neuropsych evaluation to rule out sensory processing disorder/autism disorder - pended     Masha Adam Registered Nurse   [facility]

## 2018-11-26 NOTE — TELEPHONE ENCOUNTER
Per [employer] message below, pt request a Neuropsychology referral for sensory processing disorder or autism spectrum disorder.    Venita Taylor, [place] Referral Rep

## 2018-11-27 NOTE — TELEPHONE ENCOUNTER
I am not sure if this is the correct referral that was placed.  Under the mental health referral, there also is assessment and testing.  If this is not the correct referral, please pend the mental health assessment and testing and I will sign.  Thank you.

## 2018-11-27 NOTE — TELEPHONE ENCOUNTER
Phone call to neuropsychology - they do not do testing assessing for what is requested by patient     Mental health - referral pended and routed to pcp     Masha Adam Registered Nurse   [facility]

## 2018-11-28 NOTE — TELEPHONE ENCOUNTER
Spoke with pt and read Heydi Butler CNP message below. Gave pt [facility] number [phone] to schedule assessment appointment.    Garry Fritz Referral Rep

## 2018-12-16 ENCOUNTER — HOSPITAL ENCOUNTER (EMERGENCY)
Facility: CLINIC | Age: 38
Discharge: HOME | End: 2018-12-16
Attending: EMERGENCY MEDICINE | Admitting: EMERGENCY MEDICINE
Payer: COMMERCIAL

## 2018-12-16 VITALS
RESPIRATION RATE: 18 BRPM | OXYGEN SATURATION: 100 % | BODY MASS INDEX: 25.18 KG/M2 | DIASTOLIC BLOOD PRESSURE: 80 MMHG | WEIGHT: 170 LBS | SYSTOLIC BLOOD PRESSURE: 139 MMHG | TEMPERATURE: 98.5 F | HEIGHT: 69 IN | HEART RATE: 65 BPM

## 2018-12-16 DIAGNOSIS — S09.90XA MINOR HEAD INJURY, INITIAL ENCOUNTER: ICD-10-CM

## 2018-12-16 PROCEDURE — 99282 EMERGENCY DEPT VISIT SF MDM: CPT | Mod: Z6 | Performed by: EMERGENCY MEDICINE

## 2018-12-16 PROCEDURE — 99282 EMERGENCY DEPT VISIT SF MDM: CPT | Performed by: EMERGENCY MEDICINE

## 2018-12-16 ASSESSMENT — MIFFLIN-ST. JEOR: SCORE: 1515.49

## 2018-12-16 NOTE — ED AVS SNAPSHOT
[facility], [place], Emergency Department  [address] [place] [id number]  Phone:  [phone]                                    Марина Whitehead   MRN: [id number]    Department:  [facility], Emergency Department   Date of Visit:  12/16/2018           After Visit Summary Signature Page    I have received my discharge instructions, and my questions have been answered. I have discussed any challenges I see with this plan with the nurse or doctor.    ..........................................................................................................................................  Patient/Patient Representative Signature      ..........................................................................................................................................  Patient Representative Print Name and Relationship to Patient    ..................................................               ................................................  Date                                   Time    ..........................................................................................................................................  Reviewed by Signature/Title    ...................................................              ..............................................  Date                                               Time          22EPIC Rev 08/18

## 2018-12-16 NOTE — DISCHARGE INSTRUCTIONS
[facility]  [address]oor [address]  [address] [place] [address]  Appointments: [phone]

## 2018-12-16 NOTE — ED PROVIDER NOTES
"  History     Chief Complaint   Patient presents with     Head Injury     HPI  Марина Whitehead is a 38 year old female who presents emergency room with minor head injury.  Patient states that she was playing with her 4-year-old child with a hit their heads together around 4:00 this afternoon.  She states that the child had her in her right temple and now she has some tenderness in the area that seems to be improving.  She was slightly nauseous for a while but did not vomit.  She did not take anything for the discomfort prior to coming into the emergency room.  She has had concussions in the past but did not need intervention by a provider.    I have reviewed the Medications, Allergies, Past Medical and Surgical History, and Social History in the Epic system.    Review of Systems   All other systems reviewed and are negative.      Physical Exam   BP: 139/80  Pulse: 65  Temp: 98.5  F (36.9  C)  Resp: 18  Height: 175.3 cm (5' 9\")  Weight: 77.1 kg (170 lb)  SpO2: 100 %      Physical Exam   Constitutional: She is oriented to person, place, and time. She appears well-developed and well-nourished. No distress.   HENT:   Head: Normocephalic and atraumatic.       Eyes: No scleral icterus.   Neck: Normal range of motion. Neck supple.   Neurological: She is alert and oriented to person, place, and time.   Skin: Skin is warm and dry. No rash noted. She is not diaphoretic. No erythema. No pallor.       ED Course        Procedures             Critical Care time:  none             Labs Ordered and Resulted from Time of ED Arrival Up to the Time of Departure from the ED - No data to display         Assessments & Plan (with Medical Decision Making)   This is a 38-year-old female patient coming into the emergency room with a minor head injury after against her small child.  Her physical exam shows that she has a small area of tenderness around her right temple.  Her neurologic exam is completely intact.  She has minor tenderness in the " area but has no other signs of significant trauma.  Patient was provided with aftercare instructions for minor concussion and provided with contact information for the concussion clinic if she needs it.  She is instructed to utilize Motrin and Tylenol for discomfort and reasons to return to the emergency room.    I have reviewed the nursing notes.    I have reviewed the findings, diagnosis, plan and need for follow up with the patient.       Medication List      There are no discharge medications for this visit.         Final diagnoses:   Minor head injury, initial encounter       12/16/2018   [facility], [place], EMERGENCY DEPARTMENT     Juan Eli MD  12/16/18 [id number]

## 2018-12-16 NOTE — ED TRIAGE NOTES
Pt presents to ED with c/o head injury. Pt's son knocked heads with pt today around 1530. Pt c/o nausea, 'wooziness' and pain in jaw. Reports pain has been improving. Denies losing consciousness.

## 2019-02-18 ENCOUNTER — OFFICE VISIT (OUTPATIENT)
Dept: FAMILY MEDICINE | Facility: CLINIC | Age: 39
End: 2019-02-18
Payer: COMMERCIAL

## 2019-02-18 VITALS
BODY MASS INDEX: 24.47 KG/M2 | WEIGHT: 165.2 LBS | SYSTOLIC BLOOD PRESSURE: 110 MMHG | HEART RATE: 74 BPM | OXYGEN SATURATION: 98 % | DIASTOLIC BLOOD PRESSURE: 61 MMHG | TEMPERATURE: 98.3 F | RESPIRATION RATE: 16 BRPM | HEIGHT: 69 IN

## 2019-02-18 DIAGNOSIS — F32.5 MAJOR DEPRESSION IN COMPLETE REMISSION (H): ICD-10-CM

## 2019-02-18 DIAGNOSIS — F33.41 RECURRENT MAJOR DEPRESSIVE DISORDER, IN PARTIAL REMISSION (H): Primary | ICD-10-CM

## 2019-02-18 PROCEDURE — 99213 OFFICE O/P EST LOW 20 MIN: CPT | Performed by: NURSE PRACTITIONER

## 2019-02-18 RX ORDER — SERTRALINE HYDROCHLORIDE 100 MG/1
100 TABLET, FILM COATED ORAL DAILY
Qty: 30 TABLET | Refills: 0 | Status: SHIPPED | OUTPATIENT
Start: 2019-02-18 | End: 2019-03-12

## 2019-02-18 RX ORDER — BUPROPION HYDROCHLORIDE 150 MG/1
150 TABLET, EXTENDED RELEASE ORAL 2 TIMES DAILY
Qty: 60 TABLET | Refills: 0 | Status: SHIPPED | OUTPATIENT
Start: 2019-02-18 | End: 2020-05-29

## 2019-02-18 ASSESSMENT — ANXIETY QUESTIONNAIRES
IF YOU CHECKED OFF ANY PROBLEMS ON THIS QUESTIONNAIRE, HOW DIFFICULT HAVE THESE PROBLEMS MADE IT FOR YOU TO DO YOUR WORK, TAKE CARE OF THINGS AT HOME, OR GET ALONG WITH OTHER PEOPLE: SOMEWHAT DIFFICULT
2. NOT BEING ABLE TO STOP OR CONTROL WORRYING: NOT AT ALL
3. WORRYING TOO MUCH ABOUT DIFFERENT THINGS: NOT AT ALL
5. BEING SO RESTLESS THAT IT IS HARD TO SIT STILL: NOT AT ALL
1. FEELING NERVOUS, ANXIOUS, OR ON EDGE: SEVERAL DAYS
6. BECOMING EASILY ANNOYED OR IRRITABLE: MORE THAN HALF THE DAYS
7. FEELING AFRAID AS IF SOMETHING AWFUL MIGHT HAPPEN: NOT AT ALL
GAD7 TOTAL SCORE: 4

## 2019-02-18 ASSESSMENT — PATIENT HEALTH QUESTIONNAIRE - PHQ9
5. POOR APPETITE OR OVEREATING: SEVERAL DAYS
SUM OF ALL RESPONSES TO PHQ QUESTIONS 1-9: 10

## 2019-02-18 ASSESSMENT — MIFFLIN-ST. JEOR: SCORE: 1480.78

## 2019-02-18 NOTE — PATIENT INSTRUCTIONS
Stay on zoloft/sertraline at 50 mg for now  Start wellbutrin 150 mg in the morning x 1 week, then add a dose in the evening  After 2 weeks on the wellbutrin you can go up to 100 mg daily on the sertraline.    And stay on the wellbutrin twice a day  continue counseling  Increase exercise.    follow up in the clinic in 1 month.    Call if worsening.

## 2019-02-18 NOTE — PROGRESS NOTES
"  SUBJECTIVE:   Марина Whitehead is a 39 year old female who presents to clinic today for the following health issues:        Depression Followup    Status since last visit: Worsened pt states she has 2 special need kids at home and things are just really hard at home right now.     See PHQ-9 for current symptoms.  Other associated symptoms: None    Complicating factors:   Significant life event:  No   Current substance abuse:  None  Anxiety or Panic symptoms:  No    PHQ 12/1/2017 7/25/2018 2/18/2019   PHQ-9 Total Score 4 4 10   Q9: Suicide Ideation Not at all Not at all Not at all     Feels like depression is worse.    Used to be  The anxiety was more problematic but no longer the case.   Has been on low dose sertraline for a few years  Trying to exercise  Eats a well balanced diet.    Struggling to help both kids with special needs.    Seeing a counselor weekly.       PHQ-9  English  PHQ-9   Any Language  Suicide Assessment Five-step Evaluation and Treatment (SAFE-T)    Amount of exercise or physical activity: 2-3 days/week for an average of 30-45 minutes    Problems taking medications regularly: No    Medication side effects: none    Diet: regular (no restrictions)        Problem list and histories reviewed & adjusted, as indicated.  Additional history: as documented    Reviewed and updated as needed this visit by clinical staff  Tobacco  Allergies  Meds  Problems  Med Hx  Surg Hx  Fam Hx  Soc Hx        Reviewed and updated as needed this visit by Provider  Tobacco  Allergies  Meds  Problems  Med Hx  Surg Hx  Fam Hx         ROS:  Constitutional, HEENT, cardiovascular, pulmonary, gi and gu systems are negative, except as otherwise noted.    OBJECTIVE:     /61   Pulse 74   Temp 98.3  F (36.8  C) (Oral)   Resp 16   Ht 1.74 m (5' 8.5\")   Wt 74.9 kg (165 lb 3.2 oz)   SpO2 98%   BMI 24.75 kg/m    Body mass index is 24.75 kg/m .  GENERAL: healthy, alert and no distress  RESP: lungs clear to " auscultation - no rales, rhonchi or wheezes  CV: regular rate and rhythm, normal S1 S2, no S3 or S4, no murmur, click or rub, no peripheral edema and peripheral pulses strong  MS: no gross musculoskeletal defects noted, no edema  SKIN: no suspicious lesions or rashes  PSYCH: concentration poor, affect flat and tearful    PHQ-9 SCORE 12/1/2017 7/25/2018 2/18/2019   PHQ-9 Total Score MyChart - - -   PHQ-9 Total Score 4 4 10     RUBINA-7 SCORE 5/23/2017 12/1/2017 2/18/2019   Total Score - - -   Total Score 10 3 4           ASSESSMENT/PLAN:       ICD-10-CM    1. Recurrent major depressive disorder, in partial remission (H) F33.41 sertraline (ZOLOFT) 100 MG tablet     buPROPion (WELLBUTRIN SR) 150 MG 12 hr tablet   2. Major depression in complete remission (H) F32.5        Patient Instructions   Stay on zoloft/sertraline at 50 mg for now  Start wellbutrin 150 mg in the morning x 1 week, then add a dose in the evening  After 2 weeks on the wellbutrin you can go up to 100 mg daily on the sertraline.    And stay on the wellbutrin twice a day  continue counseling  Increase exercise.    follow up in the clinic in 1 month.    Call if worsening.         DAVIN Fisher CNP  [facility]

## 2019-02-19 ASSESSMENT — ANXIETY QUESTIONNAIRES: GAD7 TOTAL SCORE: 4

## 2019-03-01 DIAGNOSIS — F33.41 RECURRENT MAJOR DEPRESSIVE DISORDER, IN PARTIAL REMISSION (H): ICD-10-CM

## 2019-03-02 NOTE — TELEPHONE ENCOUNTER
"Requested Prescriptions   Pending Prescriptions Disp Refills     sertraline (ZOLOFT) 100 MG tablet [Pharmacy Med Name: SERTRALINE 100MG TABLETS]  Last Written Prescription Date:  2/18/19  Last Fill Quantity: 30,  # refills: 0   Last office visit: 2/18/2019 with prescribing provider:     Future Office Visit:    30 tablet 0     Sig: TAKE 1 TABLET(100 MG) BY MOUTH DAILY    SSRIs Protocol Failed - 3/1/2019 11:13 AM       Failed - PHQ-9 score less than 5 in past 6 months    Please review last PHQ-9 score.          Passed - Medication is active on med list       Passed - Patient is age 18 or older       Passed - No active pregnancy on record       Passed - No positive pregnancy test in last 12 months       Passed - Recent (6 mo) or future (30 days) visit within the authorizing provider's specialty    Patient had office visit in the last 6 months or has a visit in the next 30 days with authorizing provider or within the authorizing provider's specialty.  See \"Patient Info\" tab in inbasket, or \"Choose Columns\" in Meds & Orders section of the refill encounter.              "

## 2019-03-04 RX ORDER — SERTRALINE HYDROCHLORIDE 100 MG/1
TABLET, FILM COATED ORAL
Qty: 30 TABLET | Refills: 0 | OUTPATIENT
Start: 2019-03-04

## 2019-03-12 ENCOUNTER — OFFICE VISIT (OUTPATIENT)
Dept: FAMILY MEDICINE | Facility: CLINIC | Age: 39
End: 2019-03-12
Payer: COMMERCIAL

## 2019-03-12 VITALS
HEART RATE: 78 BPM | SYSTOLIC BLOOD PRESSURE: 112 MMHG | TEMPERATURE: 97.9 F | BODY MASS INDEX: 25.01 KG/M2 | RESPIRATION RATE: 16 BRPM | HEIGHT: 68 IN | DIASTOLIC BLOOD PRESSURE: 80 MMHG | WEIGHT: 165 LBS

## 2019-03-12 DIAGNOSIS — M62.08 DIASTASIS RECTI: ICD-10-CM

## 2019-03-12 DIAGNOSIS — F33.41 RECURRENT MAJOR DEPRESSIVE DISORDER, IN PARTIAL REMISSION (H): ICD-10-CM

## 2019-03-12 DIAGNOSIS — F32.0 MAJOR DEPRESSIVE DISORDER, SINGLE EPISODE, MILD (H): ICD-10-CM

## 2019-03-12 DIAGNOSIS — Z00.00 WELL ADULT HEALTH CHECK: Primary | ICD-10-CM

## 2019-03-12 DIAGNOSIS — F41.1 GENERALIZED ANXIETY DISORDER: ICD-10-CM

## 2019-03-12 DIAGNOSIS — N89.8 VAGINAL DISCHARGE: ICD-10-CM

## 2019-03-12 LAB
SPECIMEN SOURCE: NORMAL
WET PREP SPEC: NORMAL

## 2019-03-12 PROCEDURE — 87210 SMEAR WET MOUNT SALINE/INK: CPT | Performed by: NURSE PRACTITIONER

## 2019-03-12 PROCEDURE — 99395 PREV VISIT EST AGE 18-39: CPT | Performed by: NURSE PRACTITIONER

## 2019-03-12 PROCEDURE — G0145 SCR C/V CYTO,THINLAYER,RESCR: HCPCS | Performed by: NURSE PRACTITIONER

## 2019-03-12 PROCEDURE — 87624 HPV HI-RISK TYP POOLED RSLT: CPT | Performed by: NURSE PRACTITIONER

## 2019-03-12 RX ORDER — BUSPIRONE HYDROCHLORIDE 5 MG/1
5 TABLET ORAL 2 TIMES DAILY
Qty: 90 TABLET | Refills: 3 | Status: CANCELLED | OUTPATIENT
Start: 2019-03-12

## 2019-03-12 RX ORDER — BUPROPION HYDROCHLORIDE 150 MG/1
150 TABLET, EXTENDED RELEASE ORAL 2 TIMES DAILY
Qty: 60 TABLET | Refills: 0 | Status: CANCELLED | OUTPATIENT
Start: 2019-03-12

## 2019-03-12 RX ORDER — SERTRALINE HYDROCHLORIDE 100 MG/1
100 TABLET, FILM COATED ORAL DAILY
Qty: 90 TABLET | Refills: 3 | Status: SHIPPED | OUTPATIENT
Start: 2019-03-12 | End: 2020-02-24

## 2019-03-12 RX ORDER — BUPROPION HYDROCHLORIDE 300 MG/1
300 TABLET ORAL EVERY MORNING
Qty: 90 TABLET | Refills: 3 | Status: SHIPPED | OUTPATIENT
Start: 2019-03-12 | End: 2020-02-24

## 2019-03-12 ASSESSMENT — ENCOUNTER SYMPTOMS
DYSURIA: 0
COUGH: 0
PALPITATIONS: 0
SHORTNESS OF BREATH: 0
MYALGIAS: 0
HEARTBURN: 0
EYE PAIN: 0
ABDOMINAL PAIN: 0
HEADACHES: 0
DIARRHEA: 0
PARESTHESIAS: 0
CONSTIPATION: 1
WEAKNESS: 0
CHILLS: 0
HEMATURIA: 0
ARTHRALGIAS: 0
DIZZINESS: 0
JOINT SWELLING: 0
NERVOUS/ANXIOUS: 0
HEMATOCHEZIA: 0
NAUSEA: 0
FEVER: 0
SORE THROAT: 0

## 2019-03-12 ASSESSMENT — ANXIETY QUESTIONNAIRES
IF YOU CHECKED OFF ANY PROBLEMS ON THIS QUESTIONNAIRE, HOW DIFFICULT HAVE THESE PROBLEMS MADE IT FOR YOU TO DO YOUR WORK, TAKE CARE OF THINGS AT HOME, OR GET ALONG WITH OTHER PEOPLE: NOT DIFFICULT AT ALL
GAD7 TOTAL SCORE: 2
6. BECOMING EASILY ANNOYED OR IRRITABLE: SEVERAL DAYS
5. BEING SO RESTLESS THAT IT IS HARD TO SIT STILL: NOT AT ALL
7. FEELING AFRAID AS IF SOMETHING AWFUL MIGHT HAPPEN: NOT AT ALL
2. NOT BEING ABLE TO STOP OR CONTROL WORRYING: NOT AT ALL
1. FEELING NERVOUS, ANXIOUS, OR ON EDGE: SEVERAL DAYS
3. WORRYING TOO MUCH ABOUT DIFFERENT THINGS: NOT AT ALL

## 2019-03-12 ASSESSMENT — MIFFLIN-ST. JEOR: SCORE: 1471.94

## 2019-03-12 ASSESSMENT — PATIENT HEALTH QUESTIONNAIRE - PHQ9
SUM OF ALL RESPONSES TO PHQ QUESTIONS 1-9: 4
5. POOR APPETITE OR OVEREATING: NOT AT ALL

## 2019-03-12 NOTE — PROGRESS NOTES
SUBJECTIVE:   CC: Марина Whitehead is an 39 year old woman who presents for preventive health visit.     Physical   Annual:     Getting at least 3 servings of Calcium per day:  Yes    Bi-annual eye exam:  Yes    Dental care twice a year:  Yes    Sleep apnea or symptoms of sleep apnea:  None    Diet:  Breakfast skipped    Frequency of exercise:  2-3 days/week    Duration of exercise:  15-30 minutes    Taking medications regularly:  Yes    Additional concerns today:  No (med follow up)    PHQ-2 Total Score: 2        Today's PHQ-2 Score:   PHQ-2 ( 1999 [employer]) 3/12/2019   Q1: Little interest or pleasure in doing things 1   Q2: Feeling down, depressed or hopeless 1   PHQ-2 Score 2   Q1: Little interest or pleasure in doing things Several days   Q2: Feeling down, depressed or hopeless Several days   PHQ-2 Score 2       Abuse: Current or Past(Physical, Sexual or Emotional)- No  Do you feel safe in your environment? Yes    Social History     Tobacco Use     Smoking status: Never Smoker     Smokeless tobacco: Never Used   Substance Use Topics     Alcohol use: Yes     Alcohol/week: 1.2 oz     Alcohol Use 3/12/2019   If you drink alcohol do you typically have greater than 3 drinks per day OR greater than 7 drinks per week? No     Reviewed orders with patient.  Reviewed health maintenance and updated orders accordingly - Yes      Pertinent mammograms are reviewed under the imaging tab.  History of abnormal Pap smear: YES - updated in Problem List and Health Maintenance accordingly  PAP / HPV 3/10/2015   PAP NIL     Reviewed and updated as needed this visit by clinical staff         Reviewed and updated as needed this visit by Provider          Review of Systems   Constitutional: Negative for chills and fever.   HENT: Negative for congestion, ear pain, hearing loss and sore throat.    Eyes: Negative for pain and visual disturbance.   Respiratory: Negative for cough and shortness of breath.    Cardiovascular: Negative for chest  "pain, palpitations and peripheral edema.   Gastrointestinal: Positive for constipation. Negative for abdominal pain, diarrhea, heartburn, hematochezia and nausea.   Breasts:  Negative for tenderness and discharge.   Genitourinary: Positive for vaginal discharge. Negative for dysuria, genital sores, hematuria, pelvic pain, urgency and vaginal bleeding.   Musculoskeletal: Negative for arthralgias, joint swelling and myalgias.   Skin: Negative for rash.   Neurological: Negative for dizziness, weakness, headaches and paresthesias.   Psychiatric/Behavioral: Positive for mood changes. The patient is not nervous/anxious.        OBJECTIVE:   /80   Pulse 78   Temp 97.9  F (36.6  C) (Oral)   Resp 16   Ht 1.727 m (5' 8\")   Wt 74.8 kg (165 lb)   LMP 02/17/2019   BMI 25.09 kg/m    Physical Exam  GENERAL: healthy, alert and no distress  EYES: Eyes grossly normal to inspection, PERRL and conjunctivae and sclerae normal  HENT: ear canals and TM's normal, nose and mouth without ulcers or lesions  NECK: no adenopathy, no asymmetry, masses, or scars and thyroid normal to palpation  RESP: lungs clear to auscultation - no rales, rhonchi or wheezes  BREAST: normal without masses, tenderness or nipple discharge and no palpable axillary masses or adenopathy  CV: regular rate and rhythm, normal S1 S2, no S3 or S4, no murmur, click or rub, no peripheral edema and peripheral pulses strong  ABDOMEN: 2-3 finger width space superior to umbilicus.  soft, nontender, no hepatosplenomegaly, no masses and bowel sounds normal   (female): normal female external genitalia, normal urethral meatus, vaginal mucosa pink, moist, well rugated, and normal cervix/adnexa/uterus without masses.  thick white discharge.     MS: no gross musculoskeletal defects noted, no edema  SKIN: no suspicious lesions or rashes  NEURO: Normal strength and tone, mentation intact and speech normal  PSYCH: mentation appears normal, affect " "normal/bright      ASSESSMENT/PLAN:       ICD-10-CM    1. Well adult health check Z00.00 Pap imaged thin layer screen with HPV - recommended age 30 - 65 years (select HPV order below)     HPV High Risk Types DNA Cervical   2. Recurrent major depressive disorder, in partial remission (H) F33.41 sertraline (ZOLOFT) 100 MG tablet     buPROPion (WELLBUTRIN XL) 300 MG 24 hr tablet   3. Generalized anxiety disorder F41.1 buPROPion (WELLBUTRIN XL) 300 MG 24 hr tablet   4. Major depressive disorder, single episode, mild (H) F32.0 buPROPion (WELLBUTRIN XL) 300 MG 24 hr tablet   5. Vaginal discharge N89.8 Wet prep   6. Diastasis recti M62.08 GENERAL SURG ADULT REFERRAL      well female, not fasting for labs.  Encouraged to continue exercise and healthy diet choices.    Depression stable on zoloft and wellbutrin.  Would like to switch to daily vs BID wellbutrin.    Scant thick white discharge on vaginal exam.  Wet prep negative.    Refer to gen surg for eval of likely diastasis recti.        COUNSELING:  Reviewed preventive health counseling, as reflected in patient instructions    BP Readings from Last 1 Encounters:   02/18/19 110/61     Estimated body mass index is 24.75 kg/m  as calculated from the following:    Height as of 2/18/19: 1.74 m (5' 8.5\").    Weight as of 2/18/19: 74.9 kg (165 lb 3.2 oz).           reports that  has never smoked. she has never used smokeless tobacco.      Counseling Resources:  ATP IV Guidelines  Pooled Cohorts Equation Calculator  Breast Cancer Risk Calculator  FRAX Risk Assessment  ICSI Preventive Guidelines  Dietary Guidelines for Americans, 2010  USDA's MyPlate  ASA Prophylaxis  Lung CA Screening    DAVIN Fisher CNP  [facility]  "

## 2019-03-13 ASSESSMENT — ANXIETY QUESTIONNAIRES: GAD7 TOTAL SCORE: 2

## 2019-03-15 LAB
COPATH REPORT: NORMAL
PAP: NORMAL

## 2019-03-19 LAB
FINAL DIAGNOSIS: NORMAL
HPV HR 12 DNA CVX QL NAA+PROBE: NEGATIVE
HPV16 DNA SPEC QL NAA+PROBE: NEGATIVE
HPV18 DNA SPEC QL NAA+PROBE: NEGATIVE
SPECIMEN DESCRIPTION: NORMAL
SPECIMEN SOURCE CVX/VAG CYTO: NORMAL

## 2019-07-24 ENCOUNTER — OFFICE VISIT (OUTPATIENT)
Dept: FAMILY MEDICINE | Facility: CLINIC | Age: 39
End: 2019-07-24
Payer: COMMERCIAL

## 2019-07-24 VITALS
OXYGEN SATURATION: 98 % | HEART RATE: 98 BPM | WEIGHT: 168.6 LBS | SYSTOLIC BLOOD PRESSURE: 132 MMHG | TEMPERATURE: 98.9 F | BODY MASS INDEX: 25.64 KG/M2 | DIASTOLIC BLOOD PRESSURE: 84 MMHG

## 2019-07-24 DIAGNOSIS — Z11.3 SCREEN FOR STD (SEXUALLY TRANSMITTED DISEASE): Primary | ICD-10-CM

## 2019-07-24 PROCEDURE — 87591 N.GONORRHOEAE DNA AMP PROB: CPT | Performed by: FAMILY MEDICINE

## 2019-07-24 PROCEDURE — 86780 TREPONEMA PALLIDUM: CPT | Performed by: FAMILY MEDICINE

## 2019-07-24 PROCEDURE — 87389 HIV-1 AG W/HIV-1&-2 AB AG IA: CPT | Performed by: FAMILY MEDICINE

## 2019-07-24 PROCEDURE — 36415 COLL VENOUS BLD VENIPUNCTURE: CPT | Performed by: FAMILY MEDICINE

## 2019-07-24 PROCEDURE — 87491 CHLMYD TRACH DNA AMP PROBE: CPT | Performed by: FAMILY MEDICINE

## 2019-07-24 PROCEDURE — 99213 OFFICE O/P EST LOW 20 MIN: CPT | Performed by: FAMILY MEDICINE

## 2019-07-24 PROCEDURE — 86803 HEPATITIS C AB TEST: CPT | Performed by: FAMILY MEDICINE

## 2019-07-24 NOTE — PATIENT INSTRUCTIONS
We will release the results of your testing to [facility] in the next 3 days. Please call on Monday afternoon if you haven't seen this released to you

## 2019-07-24 NOTE — PROGRESS NOTES
Subjective:   Марина Whitehead is a 39 year old female who presents for   Chief Complaint   Patient presents with     STD     testing      Infidelity in marriage - 2 years dating back she is finding out just recently. Uncertain if he has been tested or has any symptoms.     She has no obvious symptoms including discharge or abdominal pain. Possible irritation in the spring but no yeast/BV tested at this time and this has gone away.     No painful blisters or rashes.     She is still in contact with  since finding out recently.     She is in touch with a therapist and has an appointment scheduled. Patient adherent to her wellbutrin 300 and Zoloft 100mg daily. Does not feel a dose adjustment is necessary. SInce finding out the news in the last 10 days she feels things have been more manageable. She has a good support system to reach out to if needed    Patient Active Problem List    Diagnosis Date Noted     Atypical squamous cells of undetermined significance (ASCUS) on Papanicolaou smear of cervix 03/19/2019     Priority: Medium     08/19/11: Ascus pap with pos low risk HPV type 83. ECC benign (care everywhere)  10/22/12: Ascus pap with pos low risk HPV type 83. (care everywhere)  01/04/13: [place] [facility] I. (care everywhere)  01/01/14: NIL pap, (pt reported)  03/10/15: NIL pap  03/12/19: NIL pap, Neg HR HPV result. Plan cotest in 3 years.          Family history of polycythemia vera 12/23/2016     Priority: Medium     Irritable bowel syndrome with diarrhea 11/29/2016     Priority: Medium     Major depressive disorder, single episode, mild (H) 11/14/2015     Priority: Medium     Generalized anxiety disorder 10/20/2015     Priority: Medium     Diagnosis updated by automated process. Provider to review and confirm.       Degenerative arthritis of lumbar spine 12/06/2014     Priority: Medium     Chronic SI joint pain 12/06/2014     Priority: Medium     Obesity 12/06/2014     Priority: Medium     Problem list name updated  by automated process. Provider to review         Current Outpatient Medications   Medication     buPROPion (WELLBUTRIN XL) 300 MG 24 hr tablet     fluticasone (FLONASE) 50 MCG/ACT spray     sertraline (ZOLOFT) 100 MG tablet     bisacodyl (DULCOLAX) 10 MG Suppository     buPROPion (WELLBUTRIN SR) 150 MG 12 hr tablet     busPIRone (BUSPAR) 5 MG tablet     Triamcinolone Acetonide (NASACORT AQ NA)     No current facility-administered medications for this visit.      ROS:  As above per HPI    Objective:   /84   Pulse 98   Temp 98.9  F (37.2  C) (Oral)   Wt 76.5 kg (168 lb 9.6 oz)   SpO2 98%   BMI 25.64 kg/m  , Body mass index is 25.64 kg/m .  Gen:  NAD, well-nourished, sitting in chair comfortably  HEENT: EOMI, sclera anicteric, Head normocephalic, ; nares patent; moist mucous membranes  Neck: trachea midline, no thyromegaly  CV:  Hemodynamically stable  Pulm:  no increased work of breathing , CTAB, no wheezes/rales/rhonchi   : deferred  Extrem: no cyanosis, edema or clubbing  Skin: no obvious rashes or abnormalities  Psych: Euthymic, linear thoughts, normal rate of speech    Assessment & Plan:     Марина Whitehead, 39 year old female who presents with:    Screen for STD (sexually transmitted disease)  Blood and urine testing performed. Will release results on [email or web address] per patient request.   - Hepatitis C antibody  - Chlamydia trachomatis PCR  - Neisseria gonorrhoeae PCR  - HIV Antigen Antibody Combo  - Treponema Abs w Reflex to RPR and Titer      Bayron Cummings MD   [place] [facility]    The use of Dragon/MobileSnack dictation services may have been used to construct the content in this note; any grammatical or spelling errors are non-intentional. Please contact the author of this note directly if you are in need of any clarification.

## 2019-07-25 LAB
C TRACH DNA SPEC QL NAA+PROBE: NEGATIVE
HCV AB SERPL QL IA: NONREACTIVE
HIV 1+2 AB+HIV1 P24 AG SERPL QL IA: NONREACTIVE
N GONORRHOEA DNA SPEC QL NAA+PROBE: NEGATIVE
SPECIMEN SOURCE: NORMAL
SPECIMEN SOURCE: NORMAL
T PALLIDUM AB SER QL: NONREACTIVE

## 2019-08-20 ENCOUNTER — THERAPY VISIT (OUTPATIENT)
Dept: PHYSICAL THERAPY | Facility: CLINIC | Age: 39
End: 2019-08-20
Payer: COMMERCIAL

## 2019-08-20 DIAGNOSIS — N81.10 VAGINAL PROLAPSE: ICD-10-CM

## 2019-08-20 DIAGNOSIS — M99.05 SOMATIC DYSFUNCTION OF PELVIC REGION: ICD-10-CM

## 2019-08-20 PROCEDURE — 97535 SELF CARE MNGMENT TRAINING: CPT | Mod: GP | Performed by: PHYSICAL THERAPIST

## 2019-08-20 PROCEDURE — 97161 PT EVAL LOW COMPLEX 20 MIN: CPT | Mod: GP | Performed by: PHYSICAL THERAPIST

## 2019-08-20 PROCEDURE — 97112 NEUROMUSCULAR REEDUCATION: CPT | Mod: GP | Performed by: PHYSICAL THERAPIST

## 2019-08-20 PROCEDURE — 97110 THERAPEUTIC EXERCISES: CPT | Mod: GP | Performed by: PHYSICAL THERAPIST

## 2019-08-21 PROBLEM — N81.10 VAGINAL PROLAPSE: Status: ACTIVE | Noted: 2019-08-21

## 2019-08-21 NOTE — PROGRESS NOTES
[facility] Initial Evaluation  Subjective:  The history is provided by the patient. No  was used.                       Objective:  System                                 Pelvic Dysfunction Evaluation:                      Additional History:  Delivery History:      Number of Live Births: 1    Abdominal: 2 cm GENEVA with good fascial integrity/closure w recruiment at umbilicus; rib flare present.  C-scar non tender with good mobility.    Internal PFM strength 4/5/5//5 with substitution, poor coordination with breath.   Good support on strain, no palpable or visible prolapse noted in supine positioning.                         General     ROS   History of current episode:    Onset date/MD order: self.    CC/Present symptoms: Feeling of heaviness in pelvic floor, pain with intercourse.   Pain rating (0-10): 2/10  Conditioning is improving/unchanging/worsening: unchaning    Pelvic/Abdominal Surgeries:  Hx of or present sexually transmitted disease:no  SMHx: Onset of pain w intercourse- describes as feeling like something hitting cervix and some feeling of heaviness in pelvic floor area a few months go, possibly associated with lifting weights.  Constipation present for several years, with evaluation at  [facility] ([facility] showing obstructive defecation.  Current occupation: sitting  Current activity: yoga  Goals: return to lifting, pain free intercourse  Red flags:no      Urination:  Do you leak on the way to the bathroom or with a strong urge to void? No   Do you leak with cough,sneeze, jumping, running?Yes   Any other activities that cause leaking? No   Do you have triggers that make you feel you can't wait to go to the bathroom? No What are they? na.  Type of pad and number used per day? 0  When you leak what is the amount? small  How long can you delay the need to urinate? As needed.   Do you feel excessive pressure in pelvic floor:yes.  When? End of day,  during intercourse  Frequency of daytime urination:every 3-4 hrs  Frequency of nighttime urination:1  Can you stop the flow of urine when on the toilet? Not sure  Is the volume of urine passed usually: medium. (8sec rule= 250ml with average bladder storing 400-600ml)  Do you strain to pass urine? No  Do you have a slow or hesitant urinary stream? No  Do you have difficulty initiating the urine stream? No  Is urination painful? No  How many bladder infections have you had in last 12 months?0  Fluid intake(one glass is 8oz or one cup) 6 glasses/day, 0caffinated glasses/day  0 alcohol glasses/day.  Bowel habits:  Frequency of bowel movements? 4 times a week  Consistancy of stool? soft formed, hard, [place] Stool Scale 2, 5  Do you ignore the urge to defecate? No  Do you strain to pass stool? Yes  Pelvic Pain:  Do you have any pelvic pain with intercourse, exams, use of tampons? Yes  Is initial penetration during intercourse painful? No  Is deeper penetration painful? Yes  Do you use lubricant? Yes What kind? Not sure  Are you sexually active?Yes  Have you ever been worried for your physical safety? No  Have you practiced the PF(kegel) exercises for 4 or more weeks?no  Marinoff Scale:Level 1  (Level 3: Abstinence from intercourse because of severe pain. Level 2: Painful intercourse which limites frequency of activity. Level 1: Painful intercourse not severe enough to prevent activity.)    Treatment/Education provided this session (see flow sheet for additional information):    Self Care Management/Patient education (12 min): Today's session consisted of education regarding pelvic floor muscle anatomy, normal bladder function, urge suppression techniques and/or relaxation techniques as indicated, and instruction in how to complete a bladder diary for assessment next visit. Depending on patient presentation, timed voids, double voids, and proper fluid/fiber intake discussed. Pt was instructed in the pathoanatomy of the  pelvic floor utilizing pelvic model.  We discussed what pelvic floor physical therapy is, components of exam, and typical patient progression. Prior to internal PFM exam,  patient was told that they were in control of exam progression, and if at any time were uncomfortable and wished to discontinue we would.      Pt education regarding contributing factors to pelvic pain and dysfunction related to overactivity in the pelvic floor.  Included resources for relaxed awareness and pelvic floor quieting techniqes.  Extra time spent describing pelvic floor muscle exam and treatment plan/goals, with attention to potential history that may contribute to current symptoms.  Included resources for home release techniques using dilators and/or thera wand as indicated.  Recommended partner involvement if available.  Discussed in detail potential physiological and behavioral components of pelvic pain.  Demonstrated/performed techniques for input to painful area while using visualization and relaxation.                    ASSESSMENT/PLAN:    Patient is a 39 year old male with pelvic complaints.    Patient has the following significant findings with corresponding treatment plan.                Diagnosis 1:  Prolapse  Pain -  manual therapy, self management, education, directional preference exercise and home program  Decreased ROM/flexibility - manual therapy and therapeutic exercise  Decreased joint mobility - manual therapy and therapeutic exercise  Decreased strength - therapeutic exercise and therapeutic activities  Decreased proprioception - neuro re-education and therapeutic activities  Impaired gait - gait training  Impaired muscle performance - neuro re-education  Decreased function - therapeutic activities  Impaired posture - neuro re-education    Previous and current functional limitations:  (See Goal Flow Sheet for this information)    Short term and Long term goals: (See Goal Flow Sheet for this information)      Communication ability:  Patient appears to be able to clearly communicate and understand verbal and written communication and follow directions correctly.  Treatment Explanation - The following has been discussed with the patient:   RX ordered/plan of care  Anticipated outcomes  Possible risks and side effects  This patient would benefit from PT intervention to resume normal activities.   Rehab potential is good.    Frequency:  1X week, once daily  Duration:  for 3 weeks then 1x every 3 weeks for 9 weeks  Discharge Plan:  Achieve all LTG.  Independent in home treatment program.  Reach maximal therapeutic benefit.    Please refer to the daily flowsheet for treatment today, total treatment time and time spent performing 1:1 timed codes.

## 2019-08-22 NOTE — PROGRESS NOTES
[facility] Initial Evaluation  Subjective:      and reported as 1/10 on pain scale. General health as reported by patient is good. Pertinent medical history includes:  Depression.      Current medications:  Anti-depressants.   Primary job tasks include:  Computer work and prolonged sitting.                                      Objective:  System    Physical Exam    General     ROS    Assessment/Plan:

## 2019-10-12 ENCOUNTER — IMMUNIZATION (OUTPATIENT)
Dept: NURSING | Facility: CLINIC | Age: 39
End: 2019-10-12
Payer: COMMERCIAL

## 2019-10-12 PROCEDURE — 90686 IIV4 VACC NO PRSV 0.5 ML IM: CPT

## 2019-10-12 PROCEDURE — 90471 IMMUNIZATION ADMIN: CPT

## 2019-11-19 PROBLEM — N81.10 VAGINAL PROLAPSE: Status: RESOLVED | Noted: 2019-08-21 | Resolved: 2019-11-19

## 2019-11-22 ENCOUNTER — OFFICE VISIT (OUTPATIENT)
Dept: FAMILY MEDICINE | Facility: CLINIC | Age: 39
End: 2019-11-22
Payer: COMMERCIAL

## 2019-11-22 VITALS
BODY MASS INDEX: 24.94 KG/M2 | HEART RATE: 72 BPM | WEIGHT: 164 LBS | TEMPERATURE: 98.5 F | DIASTOLIC BLOOD PRESSURE: 61 MMHG | SYSTOLIC BLOOD PRESSURE: 101 MMHG | RESPIRATION RATE: 16 BRPM | OXYGEN SATURATION: 98 %

## 2019-11-22 DIAGNOSIS — N94.6 DYSMENORRHEA: Primary | ICD-10-CM

## 2019-11-22 PROCEDURE — 99213 OFFICE O/P EST LOW 20 MIN: CPT | Performed by: NURSE PRACTITIONER

## 2019-11-22 ASSESSMENT — PATIENT HEALTH QUESTIONNAIRE - PHQ9: SUM OF ALL RESPONSES TO PHQ QUESTIONS 1-9: 4

## 2019-11-22 NOTE — PROGRESS NOTES
Subjective     Марина Whitehead is a 39 year old female who presents to clinic today for the following health issues:    HPI      Irregular Periods       Duration: 6 months     Description (location/character/radiation): heavy, irregular periods and mood swings with periods     Intensity:  moderate         Very heavy flow  periods regular in spacing and duration  Flow and symptoms seems to be drastically worsening in the last 6 months  VERY ayala. Starting to affect her work and home life.      Reviewed and updated as needed this visit by Provider  Tobacco  Allergies  Meds  Problems  Med Hx  Surg Hx  Fam Hx         Review of Systems   ROS COMP: Constitutional, HEENT, cardiovascular, pulmonary, gi and gu systems are negative, except as otherwise noted.      Objective    /61   Pulse 72   Temp 98.5  F (36.9  C)   Resp 16   Wt 74.4 kg (164 lb)   SpO2 98%   BMI 24.94 kg/m    Body mass index is 24.94 kg/m .  Physical Exam   GENERAL: healthy, alert and no distress   (female): deferred  MS: no gross musculoskeletal defects noted, no edema  SKIN: no suspicious lesions or rashes          Assessment & Plan       ICD-10-CM    1. Dysmenorrhea N94.6 OB/GYN REFERRAL     Discussed options including vaginal manual exam, vaginal US or referral to OBGYN.    Declined exam today and vaginal US.   defer GYN exam today, prefers to wait for OBGYN visit.    See Patient Instructions    No follow-ups on file.    DAVIN Fisher [facility]

## 2020-01-02 ENCOUNTER — OFFICE VISIT (OUTPATIENT)
Dept: FAMILY MEDICINE | Facility: CLINIC | Age: 40
End: 2020-01-02
Payer: COMMERCIAL

## 2020-01-02 VITALS
BODY MASS INDEX: 26.45 KG/M2 | HEIGHT: 68 IN | RESPIRATION RATE: 20 BRPM | HEART RATE: 85 BPM | OXYGEN SATURATION: 96 % | TEMPERATURE: 98.8 F | WEIGHT: 174.5 LBS | DIASTOLIC BLOOD PRESSURE: 80 MMHG | SYSTOLIC BLOOD PRESSURE: 118 MMHG

## 2020-01-02 DIAGNOSIS — R07.0 THROAT PAIN: Primary | ICD-10-CM

## 2020-01-02 DIAGNOSIS — R30.0 DYSURIA: ICD-10-CM

## 2020-01-02 LAB
ALBUMIN UR-MCNC: NEGATIVE MG/DL
APPEARANCE UR: CLEAR
BILIRUB UR QL STRIP: ABNORMAL
COLOR UR AUTO: YELLOW
DEPRECATED S PYO AG THROAT QL EIA: NORMAL
GLUCOSE UR STRIP-MCNC: NEGATIVE MG/DL
HGB UR QL STRIP: ABNORMAL
KETONES UR STRIP-MCNC: 15 MG/DL
LEUKOCYTE ESTERASE UR QL STRIP: NEGATIVE
NITRATE UR QL: NEGATIVE
PH UR STRIP: 6.5 PH (ref 5–7)
SOURCE: ABNORMAL
SP GR UR STRIP: 1.02 (ref 1–1.03)
SPECIMEN SOURCE: NORMAL
UROBILINOGEN UR STRIP-ACNC: 0.2 EU/DL (ref 0.2–1)

## 2020-01-02 PROCEDURE — 87880 STREP A ASSAY W/OPTIC: CPT | Performed by: NURSE PRACTITIONER

## 2020-01-02 PROCEDURE — 81001 URINALYSIS AUTO W/SCOPE: CPT | Performed by: NURSE PRACTITIONER

## 2020-01-02 PROCEDURE — 99213 OFFICE O/P EST LOW 20 MIN: CPT | Performed by: NURSE PRACTITIONER

## 2020-01-02 PROCEDURE — 87081 CULTURE SCREEN ONLY: CPT | Performed by: NURSE PRACTITIONER

## 2020-01-02 RX ORDER — CEFDINIR 300 MG/1
300 CAPSULE ORAL 2 TIMES DAILY
Qty: 20 CAPSULE | Refills: 0 | Status: SHIPPED | OUTPATIENT
Start: 2020-01-02 | End: 2020-05-29

## 2020-01-02 ASSESSMENT — ENCOUNTER SYMPTOMS
ABDOMINAL PAIN: 0
COUGH: 0
SORE THROAT: 1
DYSURIA: 1
LIGHT-HEADEDNESS: 0
VOMITING: 0
HEMATURIA: 0
RHINORRHEA: 1
BLOOD IN STOOL: 0
MYALGIAS: 1
FREQUENCY: 1
CHILLS: 1
FEVER: 1
NAUSEA: 0
DIARRHEA: 1
DIZZINESS: 0
HEADACHES: 1
BACK PAIN: 1

## 2020-01-02 ASSESSMENT — MIFFLIN-ST. JEOR: SCORE: 1515.03

## 2020-01-03 LAB
BACTERIA SPEC CULT: NORMAL
RBC #/AREA URNS AUTO: NORMAL /HPF
SPECIMEN SOURCE: NORMAL
WBC #/AREA URNS AUTO: NORMAL /HPF

## 2020-01-03 NOTE — PROGRESS NOTES
SUBJECTIVE:   Марина Whitehead is a 39 year old female presenting with a chief complaint of   Chief Complaint   Patient presents with     Pharyngitis     4 days      UTI     2 days u       She is an established patient of [facility].    URI Adult    Onset of symptoms was 4 day(s) ago. Did have URI for about 2 weeks got better and now worse  Course of illness is worsening.    Severity moderate  Treatment measures tried include mucinex.  Predisposing factors include ill contact: Family member , exposure to strep and seasonal allergies.      UTI    Onset of symptoms was 2day(s).  Course of illness is worsening  Severity moderate  Current and associated symptoms dysuria, frequency, urgency and back pain  Treatment and measures tried cranberry  Predisposing factors include none  Patient denies rigors, temperature > 101 degrees F., vomiting, vaginal discharge and vaginal odor        Review of Systems   Constitutional: Positive for chills and fever (low grade).   HENT: Positive for congestion, ear pain, postnasal drip, rhinorrhea and sore throat.    Respiratory: Negative for cough.    Gastrointestinal: Positive for diarrhea. Negative for abdominal pain, blood in stool, nausea and vomiting.   Genitourinary: Positive for dysuria, frequency and urgency. Negative for hematuria and vaginal discharge.   Musculoskeletal: Positive for back pain and myalgias.   Skin: Negative for rash.   Neurological: Positive for headaches. Negative for dizziness and light-headedness.       Past Medical History:   Diagnosis Date     Degenerative arthritis of lumbar spine      Depressive disorder 2004     NO ACTIVE PROBLEMS      Family History   Problem Relation Age of Onset     Hyperlipidemia Mother      Hyperlipidemia Father      Hypertension Father      Coronary Artery Disease Father      Blood Disease Maternal Grandfather         polycythemia vera     Colon Cancer Paternal Grandmother      Heart Disease Paternal Grandfather      Current Outpatient  "Medications   Medication Sig Dispense Refill     bisacodyl (DULCOLAX) 10 MG Suppository Place 1 suppository (10 mg) rectally daily as needed for constipation 25 suppository 0     buPROPion (WELLBUTRIN XL) 300 MG 24 hr tablet Take 1 tablet (300 mg) by mouth every morning 90 tablet 3     busPIRone (BUSPAR) 5 MG tablet Take 1 tablet (5 mg) by mouth 2 times daily 90 tablet 3     cefdinir (OMNICEF) 300 MG capsule Take 1 capsule (300 mg) by mouth 2 times daily for 10 days 20 capsule 0     fluticasone (FLONASE) 50 MCG/ACT spray Spray 1 spray into both nostrils daily       sertraline (ZOLOFT) 100 MG tablet Take 1 tablet (100 mg) by mouth daily 90 tablet 3     Triamcinolone Acetonide (NASACORT AQ NA)        buPROPion (WELLBUTRIN SR) 150 MG 12 hr tablet Take 1 tablet (150 mg) by mouth 2 times daily (Patient not taking: Reported on 2020) 60 tablet 0     Social History     Tobacco Use     Smoking status: Never Smoker     Smokeless tobacco: Never Used   Substance Use Topics     Alcohol use: Yes     Alcohol/week: 2.0 standard drinks       OBJECTIVE  /80   Pulse 85   Temp 98.8  F (37.1  C) (Oral)   Resp 20   Ht 1.727 m (5' 8\")   Wt 79.2 kg (174 lb 8 oz)   SpO2 96%   BMI 26.53 kg/m      Physical Exam  Vitals signs and nursing note reviewed.   Constitutional:       Appearance: She is ill-appearing.   HENT:      Head: Normocephalic and atraumatic.      Right Ear: Ear canal and external ear normal. No middle ear effusion.      Left Ear: Ear canal and external ear normal.  No middle ear effusion.      Nose: Congestion and rhinorrhea present.      Right Sinus: No maxillary sinus tenderness or frontal sinus tenderness.      Left Sinus: No maxillary sinus tenderness or frontal sinus tenderness.      Mouth/Throat:      Pharynx: Posterior oropharyngeal erythema present. No oropharyngeal exudate.      Tonsils: No tonsillar exudate. Swellin+ on the right. 2+ on the left.   Eyes:      Extraocular Movements: Extraocular " movements intact.      Conjunctiva/sclera: Conjunctivae normal.      Pupils: Pupils are equal, round, and reactive to light.   Neck:      Musculoskeletal: Normal range of motion and neck supple.   Cardiovascular:      Rate and Rhythm: Normal rate and regular rhythm.      Heart sounds: Normal heart sounds.   Pulmonary:      Effort: Pulmonary effort is normal.      Breath sounds: Normal breath sounds and air entry.   Abdominal:      General: Bowel sounds are normal.      Palpations: Abdomen is soft.      Tenderness: There is no abdominal tenderness. There is no right CVA tenderness or left CVA tenderness.   Lymphadenopathy:      Head:      Right side of head: Submandibular and tonsillar adenopathy present.      Left side of head: Submandibular and tonsillar adenopathy present.      Cervical: Cervical adenopathy present.   Skin:     General: Skin is warm and dry.   Neurological:      Mental Status: She is alert and oriented to person, place, and time.   Psychiatric:         Behavior: Behavior is cooperative.         Labs:  Results for orders placed or performed in visit on 01/02/20 (from the past 24 hour(s))   Rapid strep screen   Result Value Ref Range    Specimen Description Throat     Rapid Strep A Screen       NEGATIVE: No Group A streptococcal antigen detected by immunoassay, await culture report.   UA reflex to Microscopic and Culture   Result Value Ref Range    Color Urine Yellow     Appearance Urine Clear     Glucose Urine Negative NEG^Negative mg/dL    Bilirubin Urine Small (A) NEG^Negative    Ketones Urine 15 (A) NEG^Negative mg/dL    Specific Gravity Urine 1.020 1.003 - 1.035    Blood Urine Trace (A) NEG^Negative    pH Urine 6.5 5.0 - 7.0 pH    Protein Albumin Urine Negative NEG^Negative mg/dL    Urobilinogen Urine 0.2 0.2 - 1.0 EU/dL    Nitrite Urine Negative NEG^Negative    Leukocyte Esterase Urine Negative NEG^Negative    Source Midstream Urine          ASSESSMENT:      ICD-10-CM    1. Throat pain R07.0  Rapid strep screen     Urine Microscopic     Beta strep group A culture     cefdinir (OMNICEF) 300 MG capsule   2. Dysuria R30.0 UA reflex to Microscopic and Culture     cefdinir (OMNICEF) 300 MG capsule        Medical Decision Making:    Differential Diagnosis:  URI Adult/Peds:  Pneumonia, Sinusitis, Strep pharyngitis, Tonsilitis, Viral pharyngitis and Viral upper respiratory illness  UTI: UTI, Dysuria, Urethritis and Vaginitis    Serious Comorbid Conditions:  Adult:  None    PLAN:  Discussed with patient that rapid strep was negative. Will do confirmatory testing. UA does not show infection. Given worsening of symptoms after improvement favor a bacterial illness. Will treat with omnicef twice a day for 10 days. Educated this would cover for strep throat, sinusitis, UTI. Symptomatic treatment recommendations discussed. Education was added to AVS. Patient was agreeable to plan and verbalized understanding.     Followup:    If not improving in 5-7 days or if condition worsens, follow up with your Primary Care Provider    Patient Instructions   omnicef twice a day for 10 days  Push Fluids  Plenty of rest  Tylenol and ibuprofen to help with pain or fever  Humidified air can help with congestion  Nasal saline or Flonase nasal spray to help with congestion  Salt water gargles, anesthetic throat spray, or lozenges to help with sore throat.

## 2020-01-03 NOTE — PATIENT INSTRUCTIONS
omnicef twice a day for 10 days  Push Fluids  Plenty of rest  Tylenol and ibuprofen to help with pain or fever  Humidified air can help with congestion  Nasal saline or Flonase nasal spray to help with congestion  Salt water gargles, anesthetic throat spray, or lozenges to help with sore throat.

## 2020-01-06 ENCOUNTER — TRANSFERRED RECORDS (OUTPATIENT)
Dept: HEALTH INFORMATION MANAGEMENT | Facility: CLINIC | Age: 40
End: 2020-01-06

## 2020-01-15 ENCOUNTER — OFFICE VISIT (OUTPATIENT)
Dept: URGENT CARE | Facility: URGENT CARE | Age: 40
End: 2020-01-15
Payer: COMMERCIAL

## 2020-01-15 VITALS
BODY MASS INDEX: 25.03 KG/M2 | OXYGEN SATURATION: 98 % | HEART RATE: 87 BPM | WEIGHT: 169 LBS | DIASTOLIC BLOOD PRESSURE: 74 MMHG | SYSTOLIC BLOOD PRESSURE: 114 MMHG | TEMPERATURE: 98.9 F | HEIGHT: 69 IN

## 2020-01-15 DIAGNOSIS — R06.2 SYMPTOM OF WHEEZING: ICD-10-CM

## 2020-01-15 DIAGNOSIS — Z71.84 TRAVEL ADVICE ENCOUNTER: ICD-10-CM

## 2020-01-15 DIAGNOSIS — H69.92 DYSFUNCTION OF LEFT EUSTACHIAN TUBE: Primary | ICD-10-CM

## 2020-01-15 PROCEDURE — 99214 OFFICE O/P EST MOD 30 MIN: CPT | Performed by: INTERNAL MEDICINE

## 2020-01-15 RX ORDER — FLUTICASONE PROPIONATE 50 MCG
1 SPRAY, SUSPENSION (ML) NASAL 2 TIMES DAILY
Qty: 16 G | Refills: 0 | Status: SHIPPED | OUTPATIENT
Start: 2020-01-15 | End: 2020-05-29

## 2020-01-15 RX ORDER — AZITHROMYCIN 250 MG/1
TABLET, FILM COATED ORAL
Qty: 6 TABLET | Refills: 0 | Status: SHIPPED | OUTPATIENT
Start: 2020-01-15 | End: 2020-05-29

## 2020-01-15 ASSESSMENT — MIFFLIN-ST. JEOR: SCORE: 1500.96

## 2020-01-16 NOTE — PROGRESS NOTES
SUBJECTIVE:   Марина Whitehead is a 40 year old female presenting with a chief complaint of   Chief Complaint   Patient presents with     Urgent Care     URPATRICIA     Has been sick for 2 weeks; has had a round of cefdinir (starting 1/2/20, strep negative at the time); felt better for a few days but symptoms recurred: sore throat, fatigue, slight cough, some wheezing with breathing; left ear, left eye and left side of throat all hurt; post nasal drip; ears popping at times; slight dizziness; occasional chills/sweats.  Denies hx of asthma.  Going out of country [place] so wd like to feel better.       She is an established patient of [facility]    URI Adult    Onset of symptoms was 2 week(s) ago.  treatment antibiotics - finished antibiotics yesterday.    Component      Latest Ref Rng & Units 1/2/2020   Color Urine       Yellow   Appearance Urine       Clear   Glucose Urine      NEG:Negative mg/dL Negative   Bilirubin Urine      NEG:Negative Small (A)   Ketones Urine      NEG:Negative mg/dL 15 (A)   Specific Gravity Urine      1.003 - 1.035 1.020   Blood Urine      NEG:Negative Trace (A)   pH Urine      5.0 - 7.0 pH 6.5   Protein Albumin Urine      NEG:Negative mg/dL Negative   Urobilinogen Urine      0.2 - 1.0 EU/dL 0.2   Nitrite Urine      NEG:Negative Negative   Leukocyte Esterase Urine      NEG:Negative Negative   Source       Midstream Urine   Specimen Description       Throat   Rapid Strep A Screen       NEGATIVE: No Group A streptococcal antigen detected by immunoassay, await culture report.   WBC Urine      OTO5:0 - 5 /HPF 0 - 5   RBC Urine      OTO2:O - 2 /HPF O - 2       Initially felt better  Now back to square one    Current and Associated symptoms: runny nose, ear pain popping and sore throat  Wheeze deep breathing  Treatment measures tried include antibiotics .  Predisposing factors include recent illness .        Review of Systems    Past Medical History:   Diagnosis Date     Degenerative arthritis of lumbar spine   "    Depressive disorder 2004     NO ACTIVE PROBLEMS      Family History   Problem Relation Age of Onset     Hyperlipidemia Mother      Hyperlipidemia Father      Hypertension Father      Coronary Artery Disease Father      Blood Disease Maternal Grandfather         polycythemia vera     Colon Cancer Paternal Grandmother      Heart Disease Paternal Grandfather      Current Outpatient Medications   Medication Sig Dispense Refill     azithromycin (ZITHROMAX) 250 MG tablet Two tablets first day, then one tablet daily for four days. 6 tablet 0     buPROPion (WELLBUTRIN XL) 300 MG 24 hr tablet Take 1 tablet (300 mg) by mouth every morning 90 tablet 3     fluticasone (FLONASE) 50 MCG/ACT nasal spray Spray 1 spray into both nostrils 2 times daily for 10 days 16 g 0     fluticasone (FLONASE) 50 MCG/ACT spray Spray 1 spray into both nostrils daily       sertraline (ZOLOFT) 100 MG tablet Take 1 tablet (100 mg) by mouth daily 90 tablet 3     bisacodyl (DULCOLAX) 10 MG Suppository Place 1 suppository (10 mg) rectally daily as needed for constipation 25 suppository 0     buPROPion (WELLBUTRIN SR) 150 MG 12 hr tablet Take 1 tablet (150 mg) by mouth 2 times daily (Patient not taking: Reported on 1/15/2020) 60 tablet 0     busPIRone (BUSPAR) 5 MG tablet Take 1 tablet (5 mg) by mouth 2 times daily (Patient not taking: Reported on 1/15/2020) 90 tablet 3     Triamcinolone Acetonide (NASACORT AQ NA)        Social History     Tobacco Use     Smoking status: Never Smoker     Smokeless tobacco: Never Used   Substance Use Topics     Alcohol use: Yes     Alcohol/week: 2.0 standard drinks       OBJECTIVE  /74 (BP Location: Left arm, Patient Position: Sitting, Cuff Size: Adult Regular)   Pulse 87   Temp 98.9  F (37.2  C) (Oral)   Ht 1.753 m (5' 9\")   Wt 76.7 kg (169 lb)   LMP 01/14/2020 (Exact Date)   SpO2 98%   BMI 24.96 kg/m      Physical Exam  Constitutional:       Appearance: Normal appearance.      Comments: fatigue   HENT: "      Right Ear: Tympanic membrane normal.      Left Ear: Tympanic membrane normal.      Mouth/Throat:      Pharynx: No oropharyngeal exudate or posterior oropharyngeal erythema.      Comments: white PND  Eyes:      Conjunctiva/sclera: Conjunctivae normal.   Cardiovascular:      Rate and Rhythm: Normal rate and regular rhythm.      Pulses: Normal pulses.      Heart sounds: Normal heart sounds.   Pulmonary:      Effort: Pulmonary effort is normal.      Breath sounds: Normal breath sounds.   Neurological:      Mental Status: She is alert.         Labs:  No results found for this or any previous visit (from the past 24 hour(s)).        ASSESSMENT:      ICD-10-CM    1. Dysfunction of left eustachian tube H69.82 fluticasone (FLONASE) 50 MCG/ACT nasal spray   2. Symptom of wheezing R06.2    3. Travel advice encounter Z71.84 azithromycin (ZITHROMAX) 250 MG tablet          Patient Instructions   Flonase 2 x day for 10 days also play travel. For ear congestion & sore throat   Albuterol inhaler 2 puffs 4 x day as needed for wheezing as needed.    For traveller's diarrhea, may take z-maria luisa with you to [place].  CDC.gov/travel

## 2020-01-16 NOTE — PATIENT INSTRUCTIONS
Flonase 2 x day for 10 days also play travel. For ear congestion & sore throat   Albuterol inhaler 2 puffs 4 x day as needed for wheezing as needed.    For traveller's diarrhea, may take z-maria luisa with you to [place].  CDC.gov/travel

## 2020-01-26 ENCOUNTER — OFFICE VISIT (OUTPATIENT)
Dept: URGENT CARE | Facility: URGENT CARE | Age: 40
End: 2020-01-26
Payer: COMMERCIAL

## 2020-01-26 VITALS
WEIGHT: 167 LBS | HEIGHT: 69 IN | TEMPERATURE: 100.3 F | HEART RATE: 99 BPM | RESPIRATION RATE: 16 BRPM | BODY MASS INDEX: 24.73 KG/M2 | SYSTOLIC BLOOD PRESSURE: 120 MMHG | OXYGEN SATURATION: 99 % | DIASTOLIC BLOOD PRESSURE: 79 MMHG

## 2020-01-26 DIAGNOSIS — R50.9 FEVER, UNSPECIFIED FEVER CAUSE: Primary | ICD-10-CM

## 2020-01-26 LAB
ALBUMIN UR-MCNC: NEGATIVE MG/DL
APPEARANCE UR: CLEAR
BASOPHILS # BLD AUTO: 0 10E9/L (ref 0–0.2)
BASOPHILS NFR BLD AUTO: 0.6 %
BILIRUB UR QL STRIP: ABNORMAL
COLOR UR AUTO: YELLOW
DIFFERENTIAL METHOD BLD: ABNORMAL
EOSINOPHIL # BLD AUTO: 0.1 10E9/L (ref 0–0.7)
EOSINOPHIL NFR BLD AUTO: 1.3 %
ERYTHROCYTE [DISTWIDTH] IN BLOOD BY AUTOMATED COUNT: 12.9 % (ref 10–15)
FLUAV+FLUBV AG SPEC QL: NEGATIVE
FLUAV+FLUBV AG SPEC QL: NEGATIVE
GLUCOSE UR STRIP-MCNC: NEGATIVE MG/DL
HCT VFR BLD AUTO: 45.3 % (ref 35–47)
HGB BLD-MCNC: 14.5 G/DL (ref 11.7–15.7)
HGB UR QL STRIP: ABNORMAL
KETONES UR STRIP-MCNC: 40 MG/DL
LEUKOCYTE ESTERASE UR QL STRIP: NEGATIVE
LYMPHOCYTES # BLD AUTO: 0.5 10E9/L (ref 0.8–5.3)
LYMPHOCYTES NFR BLD AUTO: 9.4 %
MCH RBC QN AUTO: 32.2 PG (ref 26.5–33)
MCHC RBC AUTO-ENTMCNC: 32 G/DL (ref 31.5–36.5)
MCV RBC AUTO: 101 FL (ref 78–100)
MONOCYTES # BLD AUTO: 0.8 10E9/L (ref 0–1.3)
MONOCYTES NFR BLD AUTO: 14.2 %
NEUTROPHILS # BLD AUTO: 4 10E9/L (ref 1.6–8.3)
NEUTROPHILS NFR BLD AUTO: 74.5 %
NITRATE UR QL: NEGATIVE
PH UR STRIP: 6.5 PH (ref 5–7)
PLASMODIUM AG BLD QL IA: NEGATIVE
PLATELET # BLD AUTO: 268 10E9/L (ref 150–450)
RBC # BLD AUTO: 4.5 10E12/L (ref 3.8–5.2)
RBC #/AREA URNS AUTO: NORMAL /HPF
SOURCE: ABNORMAL
SP GR UR STRIP: 1.02 (ref 1–1.03)
SPECIMEN SOURCE: NORMAL
UROBILINOGEN UR STRIP-ACNC: 0.2 EU/DL (ref 0.2–1)
WBC # BLD AUTO: 5.4 10E9/L (ref 4–11)
WBC #/AREA URNS AUTO: NORMAL /HPF

## 2020-01-26 PROCEDURE — 85025 COMPLETE CBC W/AUTO DIFF WBC: CPT | Performed by: FAMILY MEDICINE

## 2020-01-26 PROCEDURE — 87899 AGENT NOS ASSAY W/OPTIC: CPT | Performed by: FAMILY MEDICINE

## 2020-01-26 PROCEDURE — 87804 INFLUENZA ASSAY W/OPTIC: CPT | Performed by: FAMILY MEDICINE

## 2020-01-26 PROCEDURE — 99214 OFFICE O/P EST MOD 30 MIN: CPT | Performed by: FAMILY MEDICINE

## 2020-01-26 PROCEDURE — 87207 SMEAR SPECIAL STAIN: CPT | Performed by: FAMILY MEDICINE

## 2020-01-26 PROCEDURE — 36415 COLL VENOUS BLD VENIPUNCTURE: CPT | Performed by: FAMILY MEDICINE

## 2020-01-26 PROCEDURE — 80053 COMPREHEN METABOLIC PANEL: CPT | Performed by: FAMILY MEDICINE

## 2020-01-26 PROCEDURE — 87015 SPECIMEN INFECT AGNT CONCNTJ: CPT | Performed by: FAMILY MEDICINE

## 2020-01-26 PROCEDURE — 81001 URINALYSIS AUTO W/SCOPE: CPT | Performed by: FAMILY MEDICINE

## 2020-01-26 ASSESSMENT — MIFFLIN-ST. JEOR: SCORE: 1491.89

## 2020-01-26 NOTE — PROGRESS NOTES
Dotty Whitehead is a 40 year old female who presents to clinic today for the following health issues:    HPI   Chief Complaint   Patient presents with     URI       Duration: yesterday     Description (location/character/radiation): fever, headaches and nosebleed (once yesterday), returned from [place] wednesday    Intensity:  moderate    Accompanying signs and symptoms: fatigue    History (similar episodes/previous evaluation): another friend also had nose bleed who was in [place] with her    Precipitating or alleviating factors: None    Therapies tried and outcome: none          Patient Active Problem List   Diagnosis     Degenerative arthritis of lumbar spine     Chronic SI joint pain     Obesity     Generalized anxiety disorder     Major depressive disorder, single episode, mild (H)     Irritable bowel syndrome with diarrhea     Family history of polycythemia vera     Atypical squamous cells of undetermined significance (ASCUS) on Papanicolaou smear of cervix     Past Surgical History:   Procedure Laterality Date      SECTION  03/19/10       Social History     Tobacco Use     Smoking status: Never Smoker     Smokeless tobacco: Never Used   Substance Use Topics     Alcohol use: Yes     Alcohol/week: 2.0 standard drinks     Family History   Problem Relation Age of Onset     Hyperlipidemia Mother      Hyperlipidemia Father      Hypertension Father      Coronary Artery Disease Father      Blood Disease Maternal Grandfather         polycythemia vera     Colon Cancer Paternal Grandmother      Heart Disease Paternal Grandfather          Current Outpatient Medications   Medication Sig Dispense Refill     azithromycin (ZITHROMAX) 250 MG tablet Two tablets first day, then one tablet daily for four days. 6 tablet 0     bisacodyl (DULCOLAX) 10 MG Suppository Place 1 suppository (10 mg) rectally daily as needed for constipation 25 suppository 0     buPROPion (WELLBUTRIN SR) 150 MG 12 hr tablet Take 1  "tablet (150 mg) by mouth 2 times daily (Patient not taking: Reported on 1/15/2020) 60 tablet 0     buPROPion (WELLBUTRIN XL) 300 MG 24 hr tablet Take 1 tablet (300 mg) by mouth every morning 90 tablet 3     busPIRone (BUSPAR) 5 MG tablet Take 1 tablet (5 mg) by mouth 2 times daily (Patient not taking: Reported on 1/15/2020) 90 tablet 3     fluticasone (FLONASE) 50 MCG/ACT spray Spray 1 spray into both nostrils daily       sertraline (ZOLOFT) 100 MG tablet Take 1 tablet (100 mg) by mouth daily 90 tablet 3     Triamcinolone Acetonide (NASACORT AQ NA)        Allergies   Allergen Reactions     Amoxicillin      Hives.     Penicillins      Hives.     Recent Labs   Lab Test 03/12/18  1811   ALT 19   CR 0.74   GFRESTIMATED 88   GFRESTBLACK >90   POTASSIUM 4.5   TSH 2.71      BP Readings from Last 3 Encounters:   01/26/20 120/79   01/15/20 114/74   01/02/20 118/80    Wt Readings from Last 3 Encounters:   01/26/20 75.8 kg (167 lb)   01/15/20 76.7 kg (169 lb)   01/02/20 79.2 kg (174 lb 8 oz)                  Reviewed and updated as needed this visit by Provider         Review of Systems   ROS COMP: Constitutional, HEENT, cardiovascular, pulmonary, GI, , musculoskeletal, neuro, skin, endocrine and psych systems are negative, except as otherwise noted.      Objective    /79 (BP Location: Right arm, Cuff Size: Adult Regular)   Pulse 99   Temp 100.3  F (37.9  C) (Oral)   Resp 16   Ht 1.753 m (5' 9\")   Wt 75.8 kg (167 lb)   LMP 01/14/2020 (Exact Date)   SpO2 99%   BMI 24.66 kg/m    Body mass index is 24.66 kg/m .  Physical Exam   GENERAL: healthy, alert and no distress  EYES: Eyes grossly normal to inspection, PERRL and conjunctivae and sclerae normal  HENT: normal cephalic/atraumatic, ear canals and TM's normal, nose and mouth without ulcers or lesions, oropharynx clear and oral mucous membranes moist  NECK: no adenopathy, no asymmetry, masses, or scars and thyroid normal to palpation  RESP: lungs clear to " auscultation - no rales, rhonchi or wheezes  CV: regular rate and rhythm, normal S1 S2, no S3 or S4, no murmur, click or rub, no peripheral edema and peripheral pulses strong  ABDOMEN: soft, nontender, no hepatosplenomegaly, no masses and bowel sounds normal  MS: no gross musculoskeletal defects noted, no edema  SKIN: no suspicious lesions or rashes  NEURO: Normal strength and tone, mentation intact and speech normal, CNII-XII intact, no neck rigidity   PSYCH: mentation appears normal, affect normal/bright      Results for orders placed or performed in visit on 01/26/20   CBC with platelets and differential     Status: Abnormal   Result Value Ref Range    WBC 5.4 4.0 - 11.0 10e9/L    RBC Count 4.50 3.8 - 5.2 10e12/L    Hemoglobin 14.5 11.7 - 15.7 g/dL    Hematocrit 45.3 35.0 - 47.0 %     (H) 78 - 100 fl    MCH 32.2 26.5 - 33.0 pg    MCHC 32.0 31.5 - 36.5 g/dL    RDW 12.9 10.0 - 15.0 %    Platelet Count 268 150 - 450 10e9/L    % Neutrophils 74.5 %    % Lymphocytes 9.4 %    % Monocytes 14.2 %    % Eosinophils 1.3 %    % Basophils 0.6 %    Absolute Neutrophil 4.0 1.6 - 8.3 10e9/L    Absolute Lymphocytes 0.5 (L) 0.8 - 5.3 10e9/L    Absolute Monocytes 0.8 0.0 - 1.3 10e9/L    Absolute Eosinophils 0.1 0.0 - 0.7 10e9/L    Absolute Basophils 0.0 0.0 - 0.2 10e9/L    Diff Method Automated Method    UA reflex to Microscopic     Status: Abnormal   Result Value Ref Range    Color Urine Yellow     Appearance Urine Clear     Glucose Urine Negative NEG^Negative mg/dL    Bilirubin Urine Small (A) NEG^Negative    Ketones Urine 40 (A) NEG^Negative mg/dL    Specific Gravity Urine 1.020 1.003 - 1.035    Blood Urine Trace (A) NEG^Negative    pH Urine 6.5 5.0 - 7.0 pH    Protein Albumin Urine Negative NEG^Negative mg/dL    Urobilinogen Urine 0.2 0.2 - 1.0 EU/dL    Nitrite Urine Negative NEG^Negative    Leukocyte Esterase Urine Negative NEG^Negative    Source Midstream Urine    Urine Microscopic     Status: None   Result Value Ref  Range    WBC Urine 0 - 5 OTO5^0 - 5 /HPF    RBC Urine O - 2 OTO2^O - 2 /HPF   Influenza A/B antigen     Status: None   Result Value Ref Range    Influenza A/B Agn Specimen Nasal     Influenza A Negative NEG^Negative    Influenza B Negative NEG^Negative         Assessment & Plan     (R50.9) Fever, unspecified fever cause  (primary encounter diagnosis)  Comment: Differentials discussed in detail including viral etiology.  Patient came back from [place] last week.  Physical examination unremarkable.  Influenza test negative and cell counts, hemoglobin normal.  Suggested to continue well hydration, over-the-counter analgesia, rest.  CMP and malaria test ordered for further evaluation considering travel history. Return criteria discussed in detail.  Patient understood and in agreement with above plan.  All questions answered.  Plan: CBC with platelets and differential,         Comprehensive metabolic panel (BMP + Alb, Alk         Phos, ALT, AST, Total. Bili, TP), Influenza A/B        antigen, Malaria screen rapid, Parasite stain,         UA reflex to Microscopic, Urine Microscopic               Charlie Tejada MD  [facility]

## 2020-01-27 LAB
ALBUMIN SERPL-MCNC: 4.2 G/DL (ref 3.4–5)
ALP SERPL-CCNC: 72 U/L (ref 40–150)
ALT SERPL W P-5'-P-CCNC: 22 U/L (ref 0–50)
ANION GAP SERPL CALCULATED.3IONS-SCNC: 7 MMOL/L (ref 3–14)
AST SERPL W P-5'-P-CCNC: 25 U/L (ref 0–45)
BILIRUB SERPL-MCNC: 0.4 MG/DL (ref 0.2–1.3)
BUN SERPL-MCNC: 12 MG/DL (ref 7–30)
CALCIUM SERPL-MCNC: 9 MG/DL (ref 8.5–10.1)
CHLORIDE SERPL-SCNC: 103 MMOL/L (ref 94–109)
CO2 SERPL-SCNC: 24 MMOL/L (ref 20–32)
CREAT SERPL-MCNC: 0.87 MG/DL (ref 0.52–1.04)
GFR SERPL CREATININE-BSD FRML MDRD: 84 ML/MIN/{1.73_M2}
GLUCOSE SERPL-MCNC: 88 MG/DL (ref 70–99)
PARASITE SPEC INSPECT: NORMAL
PARASITE SPEC INSPECT: NORMAL
POTASSIUM SERPL-SCNC: 4.8 MMOL/L (ref 3.4–5.3)
PROT SERPL-MCNC: 7.8 G/DL (ref 6.8–8.8)
SODIUM SERPL-SCNC: 134 MMOL/L (ref 133–144)
SPECIMEN SOURCE: NORMAL

## 2020-02-18 DIAGNOSIS — J31.0 OTHER RHINITIS: Primary | ICD-10-CM

## 2020-02-18 NOTE — TELEPHONE ENCOUNTER
"Requested Prescriptions   Pending Prescriptions Disp Refills     fluticasone 50 MCG/ACT NA nasal spray  Last Written Prescription Date:  1-15-20  Last Fill Quantity: 16 g,  # refills: 0   Last office visit: 1/22/2019 with prescribing provider:  Zee Merrill   Future Office Visit:         Sig: Spray 1 spray into both nostrils daily       Inhaled Steroids Protocol Passed - 2/18/2020  3:06 PM        Passed - Patient is age 12 or older        Passed - Recent (12 mo) or future (30 days) visit within the authorizing provider's specialty     Patient has had an office visit with the authorizing provider or a provider within the authorizing providers department within the previous 12 mos or has a future within next 30 days. See \"Patient Info\" tab in inbasket, or \"Choose Columns\" in Meds & Orders section of the refill encounter.              Passed - Medication is active on med list         "

## 2020-02-20 RX ORDER — FLUTICASONE PROPIONATE 50 MCG
1 SPRAY, SUSPENSION (ML) NASAL DAILY
Qty: 11 ML | Refills: 0 | Status: SHIPPED | OUTPATIENT
Start: 2020-02-20 | End: 2020-05-26

## 2020-02-24 DIAGNOSIS — F33.41 RECURRENT MAJOR DEPRESSIVE DISORDER, IN PARTIAL REMISSION (H): ICD-10-CM

## 2020-02-24 DIAGNOSIS — F41.1 GENERALIZED ANXIETY DISORDER: ICD-10-CM

## 2020-02-24 DIAGNOSIS — F32.0 MAJOR DEPRESSIVE DISORDER, SINGLE EPISODE, MILD (H): ICD-10-CM

## 2020-02-24 RX ORDER — SERTRALINE HYDROCHLORIDE 100 MG/1
TABLET, FILM COATED ORAL
Qty: 90 TABLET | Refills: 0 | Status: SHIPPED | OUTPATIENT
Start: 2020-02-24 | End: 2020-05-26

## 2020-02-24 RX ORDER — BUPROPION HYDROCHLORIDE 300 MG/1
TABLET ORAL
Qty: 90 TABLET | Refills: 0 | Status: SHIPPED | OUTPATIENT
Start: 2020-02-24 | End: 2020-05-26

## 2020-02-25 NOTE — TELEPHONE ENCOUNTER
"Requested Prescriptions   Pending Prescriptions Disp Refills     buPROPion (WELLBUTRIN XL) 300 MG 24 hr tablet [Pharmacy Med Name: BUPROPION XL 300MG TABLETS] 90 tablet 3     Sig: TAKE 1 TABLET(300 MG) BY MOUTH EVERY MORNING       SSRIs Protocol Passed - 2/24/2020  8:48 AM        Passed - PHQ-9 score less than 5 in past 6 months     Please review last PHQ-9 score.     PHQ 2/18/2019 3/12/2019 11/22/2019   PHQ-9 Total Score 10 4 4   Q9: Thoughts of better off dead/self-harm past 2 weeks Not at all Not at all Not at all               Passed - Medication is Bupropion     If the medication is Bupropion (Wellbutrin), and the patient is taking for smoking cessation; OK to refill.          Passed - Medication is active on med list        Passed - Patient is age 18 or older        Passed - No active pregnancy on record        Passed - No positive pregnancy test in last 12 months        Passed - Recent (6 mo) or future (30 days) visit within the authorizing provider's specialty     Patient had office visit in the last 6 months or has a visit in the next 30 days with authorizing provider or within the authorizing provider's specialty.  See \"Patient Info\" tab in inbasket, or \"Choose Columns\" in Meds & Orders section of the refill encounter.            sertraline (ZOLOFT) 100 MG tablet [Pharmacy Med Name: SERTRALINE 100MG TABLETS] 90 tablet 3     Sig: TAKE 1 TABLET(100 MG) BY MOUTH DAILY       SSRIs Protocol Passed - 2/24/2020  8:48 AM        Passed - PHQ-9 score less than 5 in past 6 months     Please review last PHQ-9 score.           Passed - Medication is Bupropion     If the medication is Bupropion (Wellbutrin), and the patient is taking for smoking cessation; OK to refill.          Passed - Medication is active on med list        Passed - Patient is age 18 or older        Passed - No active pregnancy on record        Passed - No positive pregnancy test in last 12 months        Passed - Recent (6 mo) or future (30 days) " "visit within the authorizing provider's specialty     Patient had office visit in the last 6 months or has a visit in the next 30 days with authorizing provider or within the authorizing provider's specialty.  See \"Patient Info\" tab in inbasket, or \"Choose Columns\" in Meds & Orders section of the refill encounter.            Signed Prescriptions:                        Disp   Refills    buPROPion (WELLBUTRIN XL) 300 MG 24 hr tab*90 tab*0        Sig: TAKE 1 TABLET(300 MG) BY MOUTH EVERY MORNING  Authorizing Provider: KRISTA AL  Ordering User: TAMERA HERNANDEZ    sertraline (ZOLOFT) 100 MG tablet          90 tab*0        Sig: TAKE 1 TABLET(100 MG) BY MOUTH DAILY  Authorizing Provider: KRISTA AL  Ordering User: TAMERA HERNANDEZ      "

## 2020-05-20 DIAGNOSIS — F41.1 GENERALIZED ANXIETY DISORDER: ICD-10-CM

## 2020-05-20 DIAGNOSIS — F32.0 MAJOR DEPRESSIVE DISORDER, SINGLE EPISODE, MILD (H): ICD-10-CM

## 2020-05-20 DIAGNOSIS — F33.41 RECURRENT MAJOR DEPRESSIVE DISORDER, IN PARTIAL REMISSION (H): ICD-10-CM

## 2020-05-25 NOTE — TELEPHONE ENCOUNTER
Needs to update PHQ 9  Patient has not been seen in the clinic within the past 6 months  Pended 3 month refill of each and routing to the provider and BRADY Rivera RN

## 2020-05-26 ENCOUNTER — MYC REFILL (OUTPATIENT)
Dept: FAMILY MEDICINE | Facility: CLINIC | Age: 40
End: 2020-05-26

## 2020-05-26 DIAGNOSIS — F41.1 GENERALIZED ANXIETY DISORDER: ICD-10-CM

## 2020-05-26 DIAGNOSIS — J31.0 OTHER RHINITIS: ICD-10-CM

## 2020-05-26 DIAGNOSIS — F32.0 MAJOR DEPRESSIVE DISORDER, SINGLE EPISODE, MILD (H): ICD-10-CM

## 2020-05-26 DIAGNOSIS — F33.41 RECURRENT MAJOR DEPRESSIVE DISORDER, IN PARTIAL REMISSION (H): ICD-10-CM

## 2020-05-26 RX ORDER — FLUTICASONE PROPIONATE 50 MCG
1 SPRAY, SUSPENSION (ML) NASAL DAILY
Qty: 11 ML | Refills: 0 | Status: SHIPPED | OUTPATIENT
Start: 2020-05-26 | End: 2020-07-22

## 2020-05-26 RX ORDER — BUPROPION HYDROCHLORIDE 300 MG/1
TABLET ORAL
Qty: 90 TABLET | Refills: 0 | Status: SHIPPED | OUTPATIENT
Start: 2020-05-26 | End: 2020-05-29

## 2020-05-26 RX ORDER — SERTRALINE HYDROCHLORIDE 100 MG/1
100 TABLET, FILM COATED ORAL DAILY
Qty: 90 TABLET | Refills: 0 | OUTPATIENT
Start: 2020-05-26

## 2020-05-26 RX ORDER — BUPROPION HYDROCHLORIDE 300 MG/1
TABLET ORAL
Qty: 90 TABLET | Refills: 0 | OUTPATIENT
Start: 2020-05-26

## 2020-05-26 RX ORDER — SERTRALINE HYDROCHLORIDE 100 MG/1
TABLET, FILM COATED ORAL
Qty: 90 TABLET | Refills: 0 | Status: SHIPPED | OUTPATIENT
Start: 2020-05-26 | End: 2020-05-29

## 2020-05-26 NOTE — TELEPHONE ENCOUNTER
Prescription approved per [facility] Refill Protocol.  Lynn Franklin RN

## 2020-05-27 NOTE — TELEPHONE ENCOUNTER
Sent MyChart of questionnaire, patient has scheduled appointment 05/29/2020, can complete at visit time.    Niki Cortez MA on 5/27/2020 at 4:10 PM

## 2020-05-29 ENCOUNTER — VIRTUAL VISIT (OUTPATIENT)
Dept: FAMILY MEDICINE | Facility: CLINIC | Age: 40
End: 2020-05-29
Payer: COMMERCIAL

## 2020-05-29 DIAGNOSIS — F41.1 GENERALIZED ANXIETY DISORDER: ICD-10-CM

## 2020-05-29 DIAGNOSIS — F32.0 MAJOR DEPRESSIVE DISORDER, SINGLE EPISODE, MILD (H): ICD-10-CM

## 2020-05-29 DIAGNOSIS — F33.41 RECURRENT MAJOR DEPRESSIVE DISORDER, IN PARTIAL REMISSION (H): ICD-10-CM

## 2020-05-29 PROCEDURE — 96127 BRIEF EMOTIONAL/BEHAV ASSMT: CPT | Performed by: NURSE PRACTITIONER

## 2020-05-29 PROCEDURE — 99214 OFFICE O/P EST MOD 30 MIN: CPT | Mod: GT | Performed by: NURSE PRACTITIONER

## 2020-05-29 RX ORDER — SERTRALINE HYDROCHLORIDE 100 MG/1
100 TABLET, FILM COATED ORAL DAILY
Qty: 90 TABLET | Refills: 3 | Status: SHIPPED | OUTPATIENT
Start: 2020-05-29 | End: 2021-03-22

## 2020-05-29 RX ORDER — BUPROPION HYDROCHLORIDE 300 MG/1
300 TABLET ORAL EVERY MORNING
Qty: 90 TABLET | Refills: 3 | Status: SHIPPED | OUTPATIENT
Start: 2020-05-29 | End: 2021-03-22

## 2020-05-29 ASSESSMENT — PATIENT HEALTH QUESTIONNAIRE - PHQ9: SUM OF ALL RESPONSES TO PHQ QUESTIONS 1-9: 4
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
## 2020-07-05 NOTE — PROGRESS NOTES
"Date: 2020 11:15:06  Clinician: Tony Lee  Clinician NPI: [id number]  Patient: Марина Whitehead  Patient : 1980  Patient Address: [address], [place] [address]  Patient Phone: [phone]  Visit Protocol: URI  Patient Summary:  Марина is a 40 year old ( : 1980 ) female who initiated a Visit for COVID-19 (Coronavirus) evaluation and screening. When asked the question \"Please sign me up to receive news, health information and promotions. \", Марина responded \"No\".    Марина states her symptoms started today.   Her symptoms consist of malaise, ear pain, a sore throat, and nasal congestion. She is experiencing difficulty breathing due to nasal congestion but she is not short of breath.   Symptom details     Nasal secretions: The color of her mucus is clear.    Sore throat: Марина reports having moderate throat pain (4-6 on a 10 point pain scale), does not have exudate on her tonsils, and can swallow liquids. She is not sure if the lymph nodes in her neck are enlarged. A rash has not appeared on the skin since the sore throat started.      Марина denies having wheezing, nausea, teeth pain, ageusia, diarrhea, vomiting, rhinitis, headache, chills, myalgias, anosmia, facial pain or pressure, fever, and cough. She also denies having recent facial or sinus surgery in the past 60 days and taking antibiotic medication in the past month.   Precipitating events  Within the past week, Марина has not been exposed to someone with strep throat.   Pertinent COVID-19 (Coronavirus) information  In the past 14 days, Марина has not worked in a congregate living setting.   She does not work or volunteer as healthcare worker or a  and does not work or volunteer in a healthcare facility.   Марина also has not lived in a congregate living setting in the past 14 days. She does not live with a healthcare worker.   Марина has not had a close contact with a laboratory-confirmed COVID-19 patient within 14 " days of symptom onset.   Pertinent medical history  Марина typically gets a yeast infection when she takes antibiotics. She has not used fluconazole (Diflucan) to treat previous yeast infections.   Марина does not need a return to work/school note.   Weight: 170 lbs   Марина does not smoke or use smokeless tobacco.   She denies pregnancy and denies breastfeeding. She has menstruated in the past month.   Additional information as reported by the patient (free text): I am having dizziness   Weight: 170 lbs    MEDICATIONS: Wellbutrin XL oral, Zoloft oral, ALLERGIES: amoxicillin  Clinician Response:  Dear Марина,   Your symptoms show that you may have coronavirus (COVID-19). This illness can cause fever, cough and trouble breathing. Many people get a mild case and get better on their own. Some people can get very sick.  What should I do?  We would like to test you for this virus.   1. Please call [phone] to schedule your visit. Explain that you were referred by [facility] to have a COVID-19 test. Be ready to share your [facility] visit ID number.  The following will serve as your written order for this COVID Test, ordered by me, for the indication of suspected COVID [Z20.828]: The test will be ordered in [employer], our electronic health record, after you are scheduled. It will show as ordered and authorized by Mickey Anne MD.  Order: COVID-19 (Coronavirus) PCR for SYMPTOMATIC testing from [facility].      2. When it's time for your COVID test:  Stay at least 6 feet away from others. (If someone will drive you to your test, stay in the backseat, as far away from the  as you can.)   Cover your mouth and nose with a mask, tissue or washcloth.  Go straight to the testing site. Don't make any stops on the way there or back.      3.Starting now: Stay home and away from others (self-isolate) until:   You've had no fever---and no medicine that reduces fever---for 3 full days (72 hours). And...   Your other symptoms have gotten better.  "For example, your cough or breathing has improved. And...   At least 10 days have passed since your symptoms started.       During this time, don't leave the house except for testing or medical care.   Stay in your own room, even for meals. Use your own bathroom if you can.   Stay away from others in your home. No hugging, kissing or shaking hands. No visitors.  Don't go to work, school or anywhere else.    Clean \"high touch\" surfaces often (doorknobs, counters, handles, etc.). Use a household cleaning spray or wipes. You'll find a full list of  on the EPA website: www.epa.gov/pesticide-registration/list-n-disinfectants-use-against-sars-cov-2.   Cover your mouth and nose with a mask, tissue or washcloth to avoid spreading germs.  Wash your hands and face often. Use soap and water.  Caregivers in these groups are at risk for severe illness due to COVID-19:  o People 65 years and older  o People who live in a nursing home or long-term care facility  o People with chronic disease (lung, heart, cancer, diabetes, kidney, liver, immunologic)  o People who have a weakened immune system, including those who:   Are in cancer treatment  Take medicine that weakens the immune system, such as corticosteroids  Had a bone marrow or organ transplant  Have an immune deficiency  Have poorly controlled HIV or AIDS  Are obese (body mass index of 40 or higher)  Smoke regularly   o Caregivers should wear gloves while washing dishes, handling laundry and cleaning bedrooms and bathrooms.  o Use caution when washing and drying laundry: Don't shake dirty laundry, and use the warmest water setting that you can.  o For more tips, go to www.cdc.gov/coronavirus/2019-ncov/downloads/10Things.pdf.    4.Sign up for Christina Lawler. We know it's scary to hear that you might have COVID-19. We want to track your symptoms to make sure you're okay over the next 2 weeks. Please look for an email from Christina Lawler---this is a free, online program that " we'll use to keep in touch. To sign up, follow the link in the email. Learn more at [email or web address]  How can I take care of myself?   Get lots of rest. Drink extra fluids (unless a doctor has told you not to).   Take Tylenol (acetaminophen) for fever or pain. If you have liver or kidney problems, ask your family doctor if it's okay to take Tylenol.   Adults can take either:    650 mg (two 325 mg pills) every 4 to 6 hours, or...   1,000 mg (two 500 mg pills) every 8 hours as needed.    Note: Don't take more than 3,000 mg in one day. Acetaminophen is found in many medicines (both prescribed and over-the-counter medicines). Read all labels to be sure you don't take too much.   For children, check the Tylenol bottle for the right dose. The dose is based on the child's age or weight.    If you have other health problems (like cancer, heart failure, an organ transplant or severe kidney disease): Call your specialty clinic if you don't feel better in the next 2 days.       Know when to call 911. Emergency warning signs include:    Trouble breathing or shortness of breath Pain or pressure in the chest that doesn't go away Feeling confused like you haven't felt before, or not being able to wake up Bluish-colored lips or face.  Where can I get more information?   [facility] -- About COVID-19: [email or web address]   CDC -- What to Do If You're Sick: www.cdc.gov/coronavirus/2019-ncov/about/steps-when-sick.html   CDC -- Ending Home Isolation: www.cdc.gov/coronavirus/2019-ncov/hcp/disposition-in-home-patients.html   CDC -- Caring for Someone: www.cdc.gov/coronavirus/2019-ncov/if-you-are-sick/care-for-someone.html   [facility] -- Interim Guidance for Hospital Discharge to Home: www.health.[facility].[place]/diseases/coronavirus/hcp/hospdischarge.pdf   [facility] clinical trials (COVID-19 research studies): clinicalaffairs.[facility]/umn-clinical-trials    Below are the COVID-19 hotlines at the  [place] Department of Health ([facility]). Interpreters are available.    For health questions: Call 345-359-1447 or 1-679.651.3200 (7 a.m. to 7 p.m.) For questions about schools and childcare: Call 838-820-1375 or 1-544.947.7292 (7 a.m. to 7 p.m.)       [place] Strep Test    I am sorry you are not feeling well. Your strep test has . Based on the information provided, it is possible that you could have strep throat. When left untreated, strep can spread to other areas of the body and cause a more serious infection.  A strep test is the only way to determine if a bacterial infection or a virus is causing your sore throat. This is done in a lab where a swab of the back of your throat is collected tested for the bacteria that causes strep.  Since you chose not to use the lab order, please be seen:     In a clinic or urgent care    Within 24 hours     Call 911 or go to the emergency room if you suddenly develop a rash, are drooling or unable to swallow fluids, if you are having difficulty breathing, or feel that your throat is closing off.   Diagnosis: Pain in throat  Diagnosis ICD: R07.0  [employer] Results: [place] Strep Test -   [employer] Secondary Results: null

## 2020-07-21 DIAGNOSIS — J31.0 OTHER RHINITIS: ICD-10-CM

## 2020-07-22 RX ORDER — FLUTICASONE PROPIONATE 50 MCG
1 SPRAY, SUSPENSION (ML) NASAL DAILY
Qty: 11 ML | Refills: 4 | Status: SHIPPED | OUTPATIENT
Start: 2020-07-22 | End: 2022-03-03

## 2020-10-02 ENCOUNTER — IMMUNIZATION (OUTPATIENT)
Dept: NURSING | Facility: CLINIC | Age: 40
End: 2020-10-02
Payer: COMMERCIAL

## 2020-10-02 PROCEDURE — 90473 IMMUNE ADMIN ORAL/NASAL: CPT

## 2020-10-02 PROCEDURE — 90672 LAIV4 VACCINE INTRANASAL: CPT

## 2020-11-09 ENCOUNTER — MYC MEDICAL ADVICE (OUTPATIENT)
Dept: FAMILY MEDICINE | Facility: CLINIC | Age: 40
End: 2020-11-09

## 2020-11-16 NOTE — PROGRESS NOTES
"  SUBJECTIVE:                                                    Марина Whitehead is a 37 year old female who presents to clinic today for the following health issues:  Chief Complaint   Patient presents with     Sinus Problem     Referral       RESPIRATORY SYMPTOMS      Duration: two -two and a half weeks     Description  Symptoms started as a typical headcold. Her symptoms did start to improve and then her symptoms settled more in her sinuses. PND, nasal congestion and facial pain/pressure, headache and dizziness, fatigue. Feeling a little better today than yesterday. Problems with the sensation that sounds are muffled and difficlty hearing.     Severity: severe    Accompanying signs and symptoms: None    History (predisposing factors):  Fourth sinus infection of the year. Requesting referral to ent     Precipitating or alleviating factors: None    Therapies tried and outcome:  nasacort and mucinex      Depression/anxiety.  Going well.  No problems at this time.        Past Medical History:   Diagnosis Date     NO ACTIVE PROBLEMS      Current Outpatient Prescriptions   Medication Sig Dispense Refill     Triamcinolone Acetonide (NASACORT AQ NA)        levofloxacin (LEVAQUIN) 500 MG tablet Take 1 tablet (500 mg) by mouth daily 10 tablet 0     sertraline (ZOLOFT) 50 MG tablet Take 1 tablet (50 mg) by mouth daily 90 tablet 3     fluticasone (FLONASE) 50 MCG/ACT spray Spray 1 spray into both nostrils daily Reported on 4/11/2017       Social History   Substance Use Topics     Smoking status: Never Smoker     Smokeless tobacco: Never Used     Alcohol use 1.2 oz/week     2 Standard drinks or equivalent per week       ROS:  ROS:  Constitutional, HEENT, cardiovascular, pulmonary, gi and gu systems are negative, except as otherwise noted.    OBJECTIVE  :/80  Pulse 76  Temp 99.1  F (37.3  C) (Oral)  Ht 5' 9\" (1.753 m)  Wt 180 lb (81.6 kg)  SpO2 97%  BMI 26.58 kg/m2  General: healthy, alert and mild distress  Skin: " Notes:   I have reviewed notes from Dr. Jeffrey Nicolas dated 02/21/20 and 10/16/20 for right Tm perforation and subsequently identified Right TM retraction, with findings of TM calcification during myringotomy.  Plan of care included referral to Neurotology.    Prior Laboratory Testing:  Lab Results   Component Value Date    WBC 9.5 05/18/2017    HCT 46.4 05/18/2017    HGB 15.3 05/18/2017     05/18/2017     Lab Results   Component Value Date    FSTS1 UNK 05/18/2017    SODIUM 140 05/18/2017    POTASSIUM 4.5 05/18/2017    CHLORIDE 104 05/18/2017    CO2 24 05/18/2017    ANIONGAP 17 05/18/2017    GLUCOSE 119 (H) 05/18/2017    BUN 33 (H) 05/18/2017    CREATININE 0.88 05/18/2017    GFRA 86 05/18/2017    GFRNA 74 05/18/2017    BCRAT 38 (H) 05/18/2017    CALCIUM 9.4 05/18/2017    BILIRUBIN 0.3 05/18/2017    AST 36 05/18/2017    GPT 77 05/18/2017    ALKPT 154 (H) 05/18/2017    TOTPROTEIN 7.5 05/18/2017    ALBUMIN 4.0 05/18/2017    GLOB 3.5 05/18/2017    AGR 1.1 05/18/2017            warm and dry.  HEENT:   Ears/TM's: bilateral TM fluid noted  Neck: bilateral anterior cervical nodes enlarged  Nares: congested  Posterior orpharynx: mildly erythemetous  Sinuses: + tenderness with palpation  Lungs: chest clear no tachypnea, retractions or cyanosis  Heart: S1, S2 normal, no murmur, no gallop, rate regular      ASSESSMENT:  (J32.9) Frequent episodes of sinusitis  (primary encounter diagnosis)  Comment:   Plan: OTOLARYNGOLOGY REFERRAL, doxycycline         (VIBRAMYCIN) 100 MG capsule            (F32.0) Major depressive disorder, single episode, mild (H)  Comment:   Plan:     Patient Instructions   For your sinus symptoms, continue your nasacort for now.  Follow up with ENT at your earliest convenience.  Take the doxycycline if your symptoms again worsen.  Follow up with me as needed.    Depression:  Continue your zoloft as prescribed.  Let me know if your symptoms worsen or change and follow up with me every 6-12 months.

## 2020-12-14 ENCOUNTER — HEALTH MAINTENANCE LETTER (OUTPATIENT)
Age: 40
End: 2020-12-14

## 2021-03-09 ENCOUNTER — RECORDS - HEALTHEAST (OUTPATIENT)
Dept: ADMINISTRATIVE | Facility: OTHER | Age: 41
End: 2021-03-09

## 2021-03-19 ENCOUNTER — TRANSFERRED RECORDS (OUTPATIENT)
Dept: HEALTH INFORMATION MANAGEMENT | Facility: CLINIC | Age: 41
End: 2021-03-19

## 2021-03-22 ENCOUNTER — OFFICE VISIT (OUTPATIENT)
Dept: FAMILY MEDICINE | Facility: CLINIC | Age: 41
End: 2021-03-22
Payer: COMMERCIAL

## 2021-03-22 VITALS
OXYGEN SATURATION: 97 % | BODY MASS INDEX: 26.96 KG/M2 | RESPIRATION RATE: 15 BRPM | TEMPERATURE: 98.1 F | DIASTOLIC BLOOD PRESSURE: 68 MMHG | WEIGHT: 182 LBS | SYSTOLIC BLOOD PRESSURE: 112 MMHG | HEART RATE: 86 BPM | HEIGHT: 69 IN

## 2021-03-22 DIAGNOSIS — Z00.00 ROUTINE GENERAL MEDICAL EXAMINATION AT A HEALTH CARE FACILITY: Primary | ICD-10-CM

## 2021-03-22 DIAGNOSIS — Z30.430 ENCOUNTER FOR IUD INSERTION: ICD-10-CM

## 2021-03-22 DIAGNOSIS — F33.41 RECURRENT MAJOR DEPRESSIVE DISORDER, IN PARTIAL REMISSION (H): ICD-10-CM

## 2021-03-22 DIAGNOSIS — F41.1 GENERALIZED ANXIETY DISORDER: ICD-10-CM

## 2021-03-22 DIAGNOSIS — F32.0 MAJOR DEPRESSIVE DISORDER, SINGLE EPISODE, MILD (H): ICD-10-CM

## 2021-03-22 PROCEDURE — 96127 BRIEF EMOTIONAL/BEHAV ASSMT: CPT | Mod: 59 | Performed by: NURSE PRACTITIONER

## 2021-03-22 PROCEDURE — 99214 OFFICE O/P EST MOD 30 MIN: CPT | Mod: 25 | Performed by: NURSE PRACTITIONER

## 2021-03-22 PROCEDURE — 99396 PREV VISIT EST AGE 40-64: CPT | Performed by: NURSE PRACTITIONER

## 2021-03-22 RX ORDER — LORAZEPAM 1 MG/1
1 TABLET ORAL EVERY 6 HOURS PRN
Qty: 2 TABLET | Refills: 0 | Status: SHIPPED | OUTPATIENT
Start: 2021-03-22 | End: 2022-03-03

## 2021-03-22 RX ORDER — SERTRALINE HYDROCHLORIDE 100 MG/1
150 TABLET, FILM COATED ORAL DAILY
Qty: 135 TABLET | Refills: 3 | Status: SHIPPED | OUTPATIENT
Start: 2021-03-22 | End: 2022-03-03

## 2021-03-22 RX ORDER — BUPROPION HYDROCHLORIDE 300 MG/1
300 TABLET ORAL EVERY MORNING
Qty: 90 TABLET | Refills: 3 | Status: SHIPPED | OUTPATIENT
Start: 2021-03-22 | End: 2022-03-03

## 2021-03-22 ASSESSMENT — ANXIETY QUESTIONNAIRES
5. BEING SO RESTLESS THAT IT IS HARD TO SIT STILL: NOT AT ALL
IF YOU CHECKED OFF ANY PROBLEMS ON THIS QUESTIONNAIRE, HOW DIFFICULT HAVE THESE PROBLEMS MADE IT FOR YOU TO DO YOUR WORK, TAKE CARE OF THINGS AT HOME, OR GET ALONG WITH OTHER PEOPLE: SOMEWHAT DIFFICULT
2. NOT BEING ABLE TO STOP OR CONTROL WORRYING: SEVERAL DAYS
6. BECOMING EASILY ANNOYED OR IRRITABLE: MORE THAN HALF THE DAYS
GAD7 TOTAL SCORE: 7
7. FEELING AFRAID AS IF SOMETHING AWFUL MIGHT HAPPEN: SEVERAL DAYS
3. WORRYING TOO MUCH ABOUT DIFFERENT THINGS: SEVERAL DAYS
1. FEELING NERVOUS, ANXIOUS, OR ON EDGE: SEVERAL DAYS

## 2021-03-22 ASSESSMENT — PATIENT HEALTH QUESTIONNAIRE - PHQ9
SUM OF ALL RESPONSES TO PHQ QUESTIONS 1-9: 8
5. POOR APPETITE OR OVEREATING: SEVERAL DAYS

## 2021-03-22 ASSESSMENT — MIFFLIN-ST. JEOR: SCORE: 1546.99

## 2021-03-22 NOTE — PROGRESS NOTES
SUBJECTIVE:   CC: Марина Whitehead is an 41 year old woman who presents for preventive health visit.       Patient has been advised of split billing requirements and indicates understanding: Yes  Healthy Habits:    Do you get at least three servings of calcium containing foods daily (dairy, green leafy vegetables, etc.)? yes    Amount of exercise or daily activities, outside of work: 5 day(s) per week    Problems taking medications regularly No    Medication side effects: No    Have you had an eye exam in the past two years? yes    Do you see a dentist twice per year? yes    Do you have sleep apnea, excessive snoring or daytime drowsiness?no    2. Consult about allergies   Feels like she is always on the edge of a sinus infection  muicinex often  Always congested.   Taking zyrtec when stuffy      3. Consulst about menstrual cycles   Thinking about IUD as moods are getting worse with periods for 5 days pre cycle  VERY nervous about the possible IUD insertion.  Wondering if there is something she can take before???          Depression and Anxiety Follow-Up    How are you doing with your depression since your last visit? No change    How are you doing with your anxiety since your last visit?  Slightly ??? Worsened     Are you having other symptoms that might be associated with depression or anxiety? No    Have you had a significant life event? OTHER: COVID     Do you have any concerns with your use of alcohol or other drugs? No    Social History     Tobacco Use     Smoking status: Never Smoker     Smokeless tobacco: Never Used   Substance Use Topics     Alcohol use: Yes     Alcohol/week: 2.0 standard drinks     Drug use: No     PHQ 11/22/2019 5/29/2020 3/22/2021   PHQ-9 Total Score 4 4 8   Q9: Thoughts of better off dead/self-harm past 2 weeks Not at all Not at all Not at all     RUBINA-7 SCORE 2/18/2019 3/12/2019 3/22/2021   Total Score - - -   Total Score 4 2 7     Last PHQ-9 3/22/2021   1.  Little interest or pleasure  in doing things 1   2.  Feeling down, depressed, or hopeless 2   3.  Trouble falling or staying asleep, or sleeping too much 1   4.  Feeling tired or having little energy 2   5.  Poor appetite or overeating 0   6.  Feeling bad about yourself 1   7.  Trouble concentrating 1   8.  Moving slowly or restless 0   Q9: Thoughts of better off dead/self-harm past 2 weeks 0   PHQ-9 Total Score 8   Difficulty at work, home, or with people Somewhat difficult     RUBINA-7  3/22/2021   1. Feeling nervous, anxious, or on edge 1   2. Not being able to stop or control worrying 1   3. Worrying too much about different things 1   4. Trouble relaxing 1   5. Being so restless that it is hard to sit still 0   6. Becoming easily annoyed or irritable 2   7. Feeling afraid, as if something awful might happen 1   RUBINA-7 Total Score 7   If you checked any problems, how difficult have they made it for you to do your work, take care of things at home, or get along with other people? Somewhat difficult       Suicide Assessment Five-step Evaluation and Treatment (SAFE-T)      Today's PHQ-2 Score:   PHQ-2 ( 1999 Pfizer) 3/22/2021 3/12/2019   Q1: Little interest or pleasure in doing things 1 1   Q2: Feeling down, depressed or hopeless 2 1   PHQ-2 Score 3 2   Q1: Little interest or pleasure in doing things - Several days   Q2: Feeling down, depressed or hopeless - Several days   PHQ-2 Score Incomplete 2     Abuse: Current or Past(Physical, Sexual or Emotional)- No  Do you feel safe in your environment? Yes    Have you ever done Advance Care Planning? (For example, a Health Directive, POLST, or a discussion with a medical provider or your loved ones about your wishes): No, advance care planning information given to patient to review.  Patient plans to discuss their wishes with loved ones or provider.      Social History     Tobacco Use     Smoking status: Never Smoker     Smokeless tobacco: Never Used   Substance Use Topics     Alcohol use: Yes      Alcohol/week: 2.0 standard drinks     If you drink alcohol do you typically have >3 drinks per day or >7 drinks per week? Yes - AUDIT SCORE:  4  AUDIT - Alcohol Use Disorders Identification Test - Reproduced from the World Health Organization Audit 2001 (Second Edition) 3/22/2021   1.  How often do you have a drink containing alcohol? 4 or more times a week   2.  How many drinks containing alcohol do you have on a typical day when you are drinking? 1 or 2   3.  How often do you have five or more drinks on one occasion? Never   4.  How often during the last year have you found that you were not able to stop drinking once you had started? Never   5.  How often during the last year have you failed to do what was normally expected of you because of drinking? Never   6.  How often during the last year have you needed a first drink in the morning to get yourself going after a heavy drinking session? Never   7.  How often during the last year have you had a feeling of guilt or remorse after drinking? Never   8.  How often during the last year have you been unable to remember what happened the night before because of your drinking? Never   9.  Have you or someone else been injured because of your drinking? No   10. Has a relative, friend, doctor or other health care worker been concerned about your drinking or suggested you cut down? No   TOTAL SCORE 4                        Reviewed orders with patient.  Reviewed health maintenance and updated orders accordingly - Yes  Lab work is in process    Breast CA Risk Screening:  No flowsheet data found.      Mammogram Screening - Offered annual screening and updated Health Maintenance for mutual plan based on risk factor consideration    Pertinent mammograms are reviewed under the imaging tab.    Pertinent mammograms are reviewed under the imaging tab.  History of abnormal Pap smear: NO - age 30-65 PAP every 5 years with negative HPV co-testing recommended  PAP / HPV Latest Ref  "Rng & Units 3/12/2019 3/10/2015   PAP - NIL NIL   HPV 16 DNA NEG:Negative Negative -   HPV 18 DNA NEG:Negative Negative -   OTHER HR HPV NEG:Negative Negative -     Reviewed and updated as needed this visit by clinical staff  Tobacco  Allergies  Meds  Problems  Med Hx  Surg Hx  Fam Hx  Soc Hx          Reviewed and updated as needed this visit by Provider  Tobacco  Allergies  Meds  Problems  Med Hx  Surg Hx  Fam Hx           ROS:  CONSTITUTIONAL: NEGATIVE for fever, chills, change in weight  INTEGUMENTARU/SKIN: NEGATIVE for worrisome rashes, moles or lesions  EYES: NEGATIVE for vision changes or irritation  ENT: NEGATIVE for ear, mouth and throat problems  RESP: NEGATIVE for significant cough or SOB  BREAST: NEGATIVE for masses, tenderness or discharge  CV: NEGATIVE for chest pain, palpitations or peripheral edema  GI: NEGATIVE for nausea, abdominal pain, heartburn, or change in bowel habits  : NEGATIVE for unusual urinary or vaginal symptoms. Periods are regular.  MUSCULOSKELETAL: NEGATIVE for significant arthralgias or myalgia  NEURO: NEGATIVE for weakness, dizziness or paresthesias  PSYCHIATRIC: NEGATIVE for changes in mood or affect    OBJECTIVE:   /68 (BP Location: Left arm, Patient Position: Chair, Cuff Size: Adult Regular)   Pulse 86   Temp 98.1  F (36.7  C) (Tympanic)   Resp 15   Ht 1.74 m (5' 8.5\")   Wt 82.6 kg (182 lb)   LMP 03/19/2021 (Exact Date)   SpO2 97%   BMI 27.27 kg/m    EXAM:  GENERAL: healthy, alert and no distress  EYES: Eyes grossly normal to inspection, PERRL and conjunctivae and sclerae normal  HENT: ear canals and TM's normal, nose and mouth without ulcers or lesions  NECK: no adenopathy, no asymmetry, masses, or scars and thyroid normal to palpation  RESP: lungs clear to auscultation - no rales, rhonchi or wheezes  CV: regular rate and rhythm, normal S1 S2, no S3 or S4, no murmur, click or rub, no peripheral edema and peripheral pulses strong  ABDOMEN: soft, " "nontender, no hepatosplenomegaly, no masses and bowel sounds normal  MS: no gross musculoskeletal defects noted, no edema  SKIN: no suspicious lesions or rashes  NEURO: Normal strength and tone, mentation intact and speech normal  PSYCH: mentation appears normal, affect normal/bright      ASSESSMENT/PLAN:       ICD-10-CM    1. Routine general medical examination at a health care facility  Z00.00    2. Recurrent major depressive disorder, in partial remission (H)  F33.41 sertraline (ZOLOFT) 100 MG tablet     buPROPion (WELLBUTRIN XL) 300 MG 24 hr tablet     OFFICE/OUTPT VISIT,EST,LEVL III   3. Generalized anxiety disorder  F41.1 buPROPion (WELLBUTRIN XL) 300 MG 24 hr tablet     OFFICE/OUTPT VISIT,EST,LEVL III   4. Major depressive disorder, single episode, mild (H)  F32.0 buPROPion (WELLBUTRIN XL) 300 MG 24 hr tablet     OFFICE/OUTPT VISIT,EST,LEVL III   5. Encounter for IUD insertion  Z30.430 OB/GYN REFERRAL     LORazepam (ATIVAN) 1 MG tablet     OFFICE/OUTPT VISIT,EST,LEVL III      well female, not fasting for labs.  Encouraged to continue exercise and healthy diet choices.      Depression/anxiety mostly  Stable and well controlled.  wellbutrin Continue this dose without change, will increase sertraline to 150 mg daily.    F/u in 1 year or sooner if worsening.      Refer to GYN for IUD consult  Given 2 doses ativan for use PRN for insertion.  Discussed would need a  after if she used it.          Patient has been advised of split billing requirements and indicates understanding: Yes  COUNSELING:   Reviewed preventive health counseling, as reflected in patient instructions    Estimated body mass index is 27.27 kg/m  as calculated from the following:    Height as of this encounter: 1.74 m (5' 8.5\").    Weight as of this encounter: 82.6 kg (182 lb).        She reports that she has never smoked. She has never used smokeless tobacco.      Counseling Resources:  ATP IV Guidelines  Pooled Cohorts Equation " Calculator  Breast Cancer Risk Calculator  BRCA-Related Cancer Risk Assessment: FHS-7 Tool  FRAX Risk Assessment  ICSI Preventive Guidelines  Dietary Guidelines for Americans, 2010  USDA's MyPlate  ASA Prophylaxis  Lung CA Screening    DAVIN Fisher CNP  [facility]

## 2021-03-23 ASSESSMENT — ANXIETY QUESTIONNAIRES: GAD7 TOTAL SCORE: 7

## 2021-04-27 ENCOUNTER — MYC MEDICAL ADVICE (OUTPATIENT)
Dept: FAMILY MEDICINE | Facility: CLINIC | Age: 41
End: 2021-04-27

## 2021-04-27 DIAGNOSIS — M25.519 SHOULDER PAIN, UNSPECIFIED CHRONICITY, UNSPECIFIED LATERALITY: ICD-10-CM

## 2021-04-27 DIAGNOSIS — M54.2 NECK PAIN: Primary | ICD-10-CM

## 2021-04-28 NOTE — TELEPHONE ENCOUNTER
Referral ordered.    Writer responded via [employer].      DAY GroverN, RN  [facility]

## 2021-06-09 ENCOUNTER — THERAPY VISIT (OUTPATIENT)
Dept: PHYSICAL THERAPY | Facility: CLINIC | Age: 41
End: 2021-06-09
Attending: NURSE PRACTITIONER
Payer: COMMERCIAL

## 2021-06-09 DIAGNOSIS — M25.519 SHOULDER PAIN, UNSPECIFIED CHRONICITY, UNSPECIFIED LATERALITY: ICD-10-CM

## 2021-06-09 DIAGNOSIS — M54.2 NECK PAIN: ICD-10-CM

## 2021-06-09 PROCEDURE — 97110 THERAPEUTIC EXERCISES: CPT | Mod: GP | Performed by: PHYSICAL THERAPIST

## 2021-06-09 PROCEDURE — 97161 PT EVAL LOW COMPLEX 20 MIN: CPT | Mod: GP | Performed by: PHYSICAL THERAPIST

## 2021-06-09 NOTE — PROGRESS NOTES
Physical Therapy Initial Evaluation  Subjective:  The history is provided by the patient. No  was used.   Patient Health History  Марина Whitehead being seen for Neck/ shoulder pain.     Problem began: 2020.   Problem occurred: Unsure   Pain is reported as 2/10 on pain scale.  General health as reported by patient is good.           Other surgery history details: .    Current medications:  Anti-depressants.    Current occupation is IT.   Primary job tasks include:  Computer work.                                    Objective:  System    Physical Exam    General     ROS   IHISTORY:    Present symptoms:  Intermittant left cervical, shoulder pain, reduced mobility in neck and shoulder.  Difficulty with putting on sports bra, sleeping hard to find positions.  Pain quality (sharp/shooting/stabbing/aching/burning/cramping):   aching.  Paresthesia (yes/no):  no    Present since (onset date): 2020.     Symptoms (improving/unchanging/worsening):  unchanging.    Symptoms commenced as a result of: not sure but may be related to weightlifting   Condition occurred in the following environment:  home    Symptoms at onset (neck/arm/forearm/headache): neck  Constant symptoms (neck/arm/forearm/headache): no  Intermittent symptoms (neck/arm/forearm/headache): neck, arm    Symptoms are made worse with the following: raising arm out to side, turning head   Symptoms are made better with the following:massage around scapular area     Disturbed sleep (yes/no): y  Number of pillows: 1  Sleeping postures (prone/sup/side R/L): side    Previous episodes (0/1-5/6-10/11+): 1 Year of first episode: current    Previous history: no cervical history  Previous treatments: self    EXAMINATION    Posture:   Sitting (good/fair/poor): poor  Standing (good/fair/poor): fair     Protruded head (yes/no): yes sig    Wry Neck (right/left/nil):  no  Relevant (yes/no):  na     Correction of posture(better/worse/no effect): inc  post cerv pain    Neurological:Neurological testing (myotomes, sensation, reflexes, nerve tension) not indicated at this time.        Movement Loss:  Left shoulder: ERP extension   Peng Mod Min Nil Pain   Protrusion        Flexion        Retraction x    ++   Extension  x   ++   Lateral flexion R        Lateral flexion L        Rotation R   x  +   Rotation L  x   +     Test Movements:   During: produces, abolishes, increases, decreases, no effect, centralizing, peripheralizing  After: better, worse, no better, no worse, no effect, centralized, peripheralized    Pretest symptoms sitting:    Symptoms During Symptoms After ROM increased ROM decreased No Effect   PRO        Rep PRO        RET Increases    No Worse         Rep RET Decreases    Better    Cervical and left shoulder     RET EXT        Rep RET EXT              Provisional Classification:  Derangement - Asymmetrical, unilateral, symptoms above elbow    Principle of Management:  Education: (Therapeutic Exercise) Pt education regarding four stages of healing: pain reduction, maintenance through exercise, recovery of function, and prevention of re-occurrence. Utilized prescription dosage of exercise theory, cut finger analogy, and scientific method to help patient understand purpose of exercise specificity and importance of compliance with exercise.  Pt also educated in traffic light response to exercise, and self assessment to guide home exercise program.    Equipment provided:  Postural correction with instruct in use of lumbar roll and sitting posture when unsupported, w education provided re: impact of posture and body mechanics on symptom production. Pt encouraged to monitor this correlation as part of overall self management.  Mechanical therapy (Y/N):  y   Extension principle:  n   Lateral principle:  y  Flexion principle:  y   Other:  Posture and sleep positioning    ASSESSMENT/PLAN:    Patient is a 41 year old female with cervical complaints.    Patient  has the following significant findings with corresponding treatment plan.                Diagnosis 1:  Cervical pain  Pain -  manual therapy, self management, education, directional preference exercise and home program  Decreased ROM/flexibility - manual therapy and therapeutic exercise  Impaired muscle performance - neuro re-education  Decreased function - therapeutic activities  Impaired posture - neuro re-education    Therapy Evaluation Codes:   1) History comprised of:   Personal factors that impact the plan of care:      None.    Comorbidity factors that impact the plan of care are:      None.     Medications impacting care: None.  2) Examination of Body Systems comprised of:   Body structures and functions that impact the plan of care:      Cervical spine.   Activity limitations that impact the plan of care are:      Sleeping.  3) Clinical presentation characteristics are:   Stable/Uncomplicated.  4) Decision-Making    Low complexity using standardized patient assessment instrument and/or measureable assessment of functional outcome.  Cumulative Therapy Evaluation is: Low complexity.    Previous and current functional limitations:  (See Goal Flow Sheet for this information)    Short term and Long term goals: (See Goal Flow Sheet for this information)     Communication ability:  Patient appears to be able to clearly communicate and understand verbal and written communication and follow directions correctly.  Treatment Explanation - The following has been discussed with the patient:   RX ordered/plan of care  Anticipated outcomes  Possible risks and side effects  This patient would benefit from PT intervention to resume normal activities.   Rehab potential is good.    Frequency:  1 X week, once daily every other week  Duration:  for 8 weeks  Discharge Plan:  Achieve all LTG.  Independent in home treatment program.  Reach maximal therapeutic benefit.    Please refer to the daily flowsheet for treatment today,  total treatment time and time spent performing 1:1 timed codes.

## 2021-06-23 ENCOUNTER — THERAPY VISIT (OUTPATIENT)
Dept: PHYSICAL THERAPY | Facility: CLINIC | Age: 41
End: 2021-06-23
Attending: NURSE PRACTITIONER
Payer: COMMERCIAL

## 2021-06-23 DIAGNOSIS — M25.519 SHOULDER PAIN, UNSPECIFIED CHRONICITY, UNSPECIFIED LATERALITY: ICD-10-CM

## 2021-06-23 DIAGNOSIS — M54.2 NECK PAIN: Primary | ICD-10-CM

## 2021-06-23 PROCEDURE — 97140 MANUAL THERAPY 1/> REGIONS: CPT | Mod: GP | Performed by: PHYSICAL THERAPIST

## 2021-06-23 PROCEDURE — 97110 THERAPEUTIC EXERCISES: CPT | Mod: GP | Performed by: PHYSICAL THERAPIST

## 2021-07-16 ENCOUNTER — TRANSFERRED RECORDS (OUTPATIENT)
Dept: HEALTH INFORMATION MANAGEMENT | Facility: CLINIC | Age: 41
End: 2021-07-16

## 2021-08-03 ENCOUNTER — MYC MEDICAL ADVICE (OUTPATIENT)
Dept: FAMILY MEDICINE | Facility: CLINIC | Age: 41
End: 2021-08-03

## 2021-08-03 DIAGNOSIS — M25.561 RIGHT KNEE PAIN: Primary | ICD-10-CM

## 2021-08-17 ENCOUNTER — OFFICE VISIT (OUTPATIENT)
Dept: URGENT CARE | Facility: URGENT CARE | Age: 41
End: 2021-08-17
Payer: COMMERCIAL

## 2021-08-17 ENCOUNTER — ANCILLARY PROCEDURE (OUTPATIENT)
Dept: GENERAL RADIOLOGY | Facility: CLINIC | Age: 41
End: 2021-08-17
Attending: NURSE PRACTITIONER
Payer: COMMERCIAL

## 2021-08-17 VITALS
OXYGEN SATURATION: 98 % | DIASTOLIC BLOOD PRESSURE: 86 MMHG | SYSTOLIC BLOOD PRESSURE: 126 MMHG | HEART RATE: 77 BPM | HEIGHT: 69 IN | WEIGHT: 185 LBS | BODY MASS INDEX: 27.4 KG/M2

## 2021-08-17 DIAGNOSIS — R06.02 SOB (SHORTNESS OF BREATH): ICD-10-CM

## 2021-08-17 DIAGNOSIS — R00.2 PALPITATIONS: ICD-10-CM

## 2021-08-17 DIAGNOSIS — R51.9 ACUTE NONINTRACTABLE HEADACHE, UNSPECIFIED HEADACHE TYPE: ICD-10-CM

## 2021-08-17 DIAGNOSIS — R00.2 PALPITATIONS: Primary | ICD-10-CM

## 2021-08-17 PROBLEM — R10.9 ABDOMINAL PAIN: Status: ACTIVE | Noted: 2021-08-17

## 2021-08-17 PROBLEM — N94.0 MITTELSCHMERZ: Status: ACTIVE | Noted: 2021-08-17

## 2021-08-17 PROBLEM — F41.1 ANXIETY STATE: Status: ACTIVE | Noted: 2021-08-17

## 2021-08-17 PROBLEM — F45.8 BRUXISM: Status: ACTIVE | Noted: 2021-08-17

## 2021-08-17 PROBLEM — D23.9 BENIGN NEOPLASM OF SKIN: Status: ACTIVE | Noted: 2021-08-17

## 2021-08-17 PROBLEM — N94.6 DYSMENORRHEA: Status: ACTIVE | Noted: 2021-08-17

## 2021-08-17 PROBLEM — J01.90 ACUTE SINUSITIS: Status: ACTIVE | Noted: 2021-08-17

## 2021-08-17 PROBLEM — R89.6 OTHER ABNORMAL PAPANICOLAOU SMEAR OF CERVIX AND CERVICAL HPV(795.09): Status: ACTIVE | Noted: 2021-08-17

## 2021-08-17 PROBLEM — K42.9 UMBILICAL HERNIA: Status: ACTIVE | Noted: 2021-08-17

## 2021-08-17 PROBLEM — M46.1 SACROILIITIS (H): Status: ACTIVE | Noted: 2021-08-17

## 2021-08-17 PROBLEM — R55 SYNCOPE AND COLLAPSE: Status: ACTIVE | Noted: 2021-08-17

## 2021-08-17 PROBLEM — M27.9 DISEASE OF JAW: Status: ACTIVE | Noted: 2021-08-17

## 2021-08-17 LAB
BASOPHILS # BLD AUTO: 0.1 10E3/UL (ref 0–0.2)
BASOPHILS NFR BLD AUTO: 1 %
D DIMER PPP FEU-MCNC: <0.27 UG/ML FEU (ref 0–0.5)
EOSINOPHIL # BLD AUTO: 0.2 10E3/UL (ref 0–0.7)
EOSINOPHIL NFR BLD AUTO: 3 %
ERYTHROCYTE [DISTWIDTH] IN BLOOD BY AUTOMATED COUNT: 12 % (ref 10–15)
HCT VFR BLD AUTO: 46.4 % (ref 35–47)
HGB BLD-MCNC: 15.1 G/DL (ref 11.7–15.7)
IMM GRANULOCYTES # BLD: 0 10E3/UL
IMM GRANULOCYTES NFR BLD: 0 %
LYMPHOCYTES # BLD AUTO: 2.2 10E3/UL (ref 0.8–5.3)
LYMPHOCYTES NFR BLD AUTO: 29 %
MCH RBC QN AUTO: 32.2 PG (ref 26.5–33)
MCHC RBC AUTO-ENTMCNC: 32.5 G/DL (ref 31.5–36.5)
MCV RBC AUTO: 99 FL (ref 78–100)
MONOCYTES # BLD AUTO: 0.6 10E3/UL (ref 0–1.3)
MONOCYTES NFR BLD AUTO: 7 %
NEUTROPHILS # BLD AUTO: 4.5 10E3/UL (ref 1.6–8.3)
NEUTROPHILS NFR BLD AUTO: 60 %
PLATELET # BLD AUTO: 336 10E3/UL (ref 150–450)
RBC # BLD AUTO: 4.69 10E6/UL (ref 3.8–5.2)
TROPONIN I SERPL-MCNC: <0.015 UG/L (ref 0–0.04)
TSH SERPL DL<=0.005 MIU/L-ACNC: 2.47 MU/L (ref 0.4–4)
WBC # BLD AUTO: 7.5 10E3/UL (ref 4–11)

## 2021-08-17 PROCEDURE — 85025 COMPLETE CBC W/AUTO DIFF WBC: CPT | Performed by: NURSE PRACTITIONER

## 2021-08-17 PROCEDURE — 93000 ELECTROCARDIOGRAM COMPLETE: CPT | Performed by: NURSE PRACTITIONER

## 2021-08-17 PROCEDURE — 99214 OFFICE O/P EST MOD 30 MIN: CPT | Performed by: NURSE PRACTITIONER

## 2021-08-17 PROCEDURE — 71046 X-RAY EXAM CHEST 2 VIEWS: CPT | Performed by: RADIOLOGY

## 2021-08-17 PROCEDURE — 84443 ASSAY THYROID STIM HORMONE: CPT | Performed by: NURSE PRACTITIONER

## 2021-08-17 PROCEDURE — 84484 ASSAY OF TROPONIN QUANT: CPT | Performed by: NURSE PRACTITIONER

## 2021-08-17 PROCEDURE — 36415 COLL VENOUS BLD VENIPUNCTURE: CPT | Performed by: NURSE PRACTITIONER

## 2021-08-17 PROCEDURE — 85379 FIBRIN DEGRADATION QUANT: CPT | Performed by: NURSE PRACTITIONER

## 2021-08-17 ASSESSMENT — ENCOUNTER SYMPTOMS
COUGH: 0
ABDOMINAL PAIN: 0
ARTHRALGIAS: 0
FATIGUE: 0
ACTIVITY CHANGE: 0
EYE PAIN: 0
HEADACHES: 1
NAUSEA: 1
APPETITE CHANGE: 0
DIAPHORESIS: 0
NUMBNESS: 0
SLEEP DISTURBANCE: 0
UNEXPECTED WEIGHT CHANGE: 1
DIZZINESS: 0
PALPITATIONS: 1
WEAKNESS: 0
PARESTHESIAS: 0
COLOR CHANGE: 0
CHILLS: 1
CHEST TIGHTNESS: 0
SHORTNESS OF BREATH: 1
NERVOUS/ANXIOUS: 1
VOMITING: 0
WHEEZING: 0
POLYDIPSIA: 1
LIGHT-HEADEDNESS: 0
FEVER: 0
PHOTOPHOBIA: 0
SPEECH DIFFICULTY: 0
MYALGIAS: 0

## 2021-08-17 ASSESSMENT — MIFFLIN-ST. JEOR: SCORE: 1568.53

## 2021-08-17 NOTE — PROGRESS NOTES
Chief Complaint   Patient presents with     Urgent Care     Pt in clinic to have eval for SOB, headache, palpatations, and elevated blood pressure for 5 days.     Respiratory Problems     Palpitations     Headache     Hypertension     SUBJECTIVE:  Марина Whitehead is a 41 year old female who presents to the clinic today with intermittent palpitations, shortness of breath, headache, nausea, diarrhea, blurred vision, thirst for 5 days. Her current major symptom is anxiety and shortness of breath. She has been working out lifting heavily for 5 months and taking pre workout. She cut her caffeine this week and that did not help much. LMP started yesterday, believes she is perimenopausal. Wonders if she has hormonal imbalance with weight gain, feeling cold and acne. Her dad just had a MI. She is on wellbutrin and zoloft for anxiety.    Past Medical History:   Diagnosis Date     Degenerative arthritis of lumbar spine      Depressive disorder 2004     NO ACTIVE PROBLEMS      ASHWAGANDHA PO,   buPROPion (WELLBUTRIN XL) 300 MG 24 hr tablet, Take 1 tablet (300 mg) by mouth every morning  sertraline (ZOLOFT) 100 MG tablet, Take 1.5 tablets (150 mg) by mouth daily  fluticasone (FLONASE) 50 MCG/ACT nasal spray, Spray 1 spray into both nostrils daily (Patient not taking: Reported on 8/17/2021)  LORazepam (ATIVAN) 1 MG tablet, Take 1 tablet (1 mg) by mouth every 6 hours as needed for anxiety (take 1 hour prior to IUD placement, do not drive after taking this) (Patient not taking: Reported on 8/17/2021)    No current facility-administered medications on file prior to visit.    Social History     Tobacco Use     Smoking status: Never Smoker     Smokeless tobacco: Never Used   Substance Use Topics     Alcohol use: Yes     Alcohol/week: 2.0 standard drinks     Allergies   Allergen Reactions     Amoxicillin      Hives.     Penicillins      Hives.     Review of Systems   Constitutional: Positive for chills and unexpected weight change.  "Negative for activity change, appetite change, diaphoresis, fatigue and fever.   Eyes: Positive for visual disturbance (blurred momentarily). Negative for photophobia and pain.   Respiratory: Positive for shortness of breath. Negative for cough, chest tightness and wheezing.    Cardiovascular: Positive for palpitations. Negative for chest pain and peripheral edema.   Gastrointestinal: Positive for nausea. Negative for abdominal pain and vomiting.   Endocrine: Positive for polydipsia.   Genitourinary: Positive for menstrual problem.   Musculoskeletal: Negative for arthralgias and myalgias.   Skin: Negative for color change and rash.   Neurological: Positive for headaches. Negative for dizziness, syncope, speech difficulty, weakness, light-headedness, numbness and paresthesias.   Psychiatric/Behavioral: Negative for sleep disturbance. The patient is nervous/anxious.      /86   Pulse 77   Ht 1.753 m (5' 9\")   Wt 83.9 kg (185 lb)   SpO2 98%   BMI 27.32 kg/m      Physical Exam  Vitals reviewed.   Constitutional:       Appearance: Normal appearance. She is not ill-appearing.   HENT:      Head: Normocephalic and atraumatic.      Nose: Nose normal.   Eyes:      General:         Right eye: No discharge.         Left eye: No discharge.      Extraocular Movements: Extraocular movements intact.      Conjunctiva/sclera: Conjunctivae normal.      Pupils: Pupils are equal, round, and reactive to light.   Cardiovascular:      Rate and Rhythm: Normal rate and regular rhythm.      Pulses: Normal pulses.      Heart sounds: Normal heart sounds. No murmur heard.   No friction rub. No gallop.    Pulmonary:      Effort: Pulmonary effort is normal. No respiratory distress.      Breath sounds: Normal breath sounds. No stridor. No wheezing, rhonchi or rales.   Chest:      Chest wall: No tenderness.   Abdominal:      General: Abdomen is flat.      Tenderness: There is no abdominal tenderness. There is no guarding. "   Musculoskeletal:         General: Normal range of motion.      Cervical back: Normal range of motion.      Right lower leg: No edema.      Left lower leg: No edema.   Skin:     General: Skin is warm and dry.      Findings: No rash.   Neurological:      General: No focal deficit present.      Mental Status: She is alert and oriented to person, place, and time.      Cranial Nerves: No cranial nerve deficit.      Sensory: No sensory deficit.      Motor: No weakness.      Coordination: Coordination normal.      Gait: Gait normal.   Psychiatric:         Mood and Affect: Mood normal.         Behavior: Behavior normal.       EKG unchanged since last with low voltage in AVL  Xray done in clinic negative for effusion, cardiomyopathy  Results for orders placed or performed in visit on 08/17/21   XR Chest 2 Views     Status: None    Narrative    XR CHEST 2 VW  8/17/2021 11:06 AM       INDICATION: palpitations, sob, tightness for 5 days  COMPARISON: None       Impression    IMPRESSION: Negative chest.    TORIE ONTIVEROS MD         SYSTEM ID:  [id number]   Results for orders placed or performed in visit on 08/17/21   CBC with platelets and differential     Status: None    Narrative    The following orders were created for panel order CBC with platelets and differential.  Procedure                               Abnormality         Status                     ---------                               -----------         ------                     CBC with platelets and d...[[id number]]                      Final result                 Please view results for these tests on the individual orders.   TSH with free T4 reflex     Status: Normal   Result Value Ref Range    TSH 2.47 0.40 - 4.00 mU/L   Troponin I     Status: Normal   Result Value Ref Range    Troponin I <0.015 0.000 - 0.045 ug/L   D dimer, quantitative     Status: Normal   Result Value Ref Range    D-Dimer Quantitative <0.27 0.00 - 0.50 ug/mL FEU    Narrative    This D-dimer  assay is intended for use in conjunction with a clinical pretest probability assessment model to exclude pulmonary embolism (PE) and deep venous thrombosis (DVT) in outpatients suspected of PE or DVT. The cut-off value is 0.50 ug/mL FEU.   CBC with platelets and differential     Status: None   Result Value Ref Range    WBC Count 7.5 4.0 - 11.0 10e3/uL    RBC Count 4.69 3.80 - 5.20 10e6/uL    Hemoglobin 15.1 11.7 - 15.7 g/dL    Hematocrit 46.4 35.0 - 47.0 %    MCV 99 78 - 100 fL    MCH 32.2 26.5 - 33.0 pg    MCHC 32.5 31.5 - 36.5 g/dL    RDW 12.0 10.0 - 15.0 %    Platelet Count 336 150 - 450 10e3/uL    % Neutrophils 60 %    % Lymphocytes 29 %    % Monocytes 7 %    % Eosinophils 3 %    % Basophils 1 %    % Immature Granulocytes 0 %    Absolute Neutrophils 4.5 1.6 - 8.3 10e3/uL    Absolute Lymphocytes 2.2 0.8 - 5.3 10e3/uL    Absolute Monocytes 0.6 0.0 - 1.3 10e3/uL    Absolute Eosinophils 0.2 0.0 - 0.7 10e3/uL    Absolute Basophils 0.1 0.0 - 0.2 10e3/uL    Absolute Immature Granulocytes 0.0 <=0.0 10e3/uL     ASSESSMENT:    ICD-10-CM    1. Palpitations  R00.2 EKG 12-lead complete w/read - Clinics     XR Chest 2 Views     CBC with platelets and differential     TSH with free T4 reflex     Troponin I     D dimer, quantitative     CBC with platelets and differential     TSH with free T4 reflex     Troponin I     D dimer, quantitative   2. SOB (shortness of breath)  R06.02    3. Acute nonintractable headache, unspecified headache type  R51.9      PLAN:   Patient Instructions   EKG unchanged since last with low voltage in AVL  Xray done in clinic negative for effusion, cardiomyopathy  Negative trop and ddimer results today  Will call if thyroid stimulating hormone or cbc is abnormal  Should follow-up with primary care provider if symptoms linger in a couple days  ER if severe chest pain, shortness of breath, palpitations, confusion, dizziness, horrible headache, weakness    Follow up with primary care provider with any  problems, questions or concerns or if symptoms worsen or fail to improve. Patient agreed to plan and verbalized understanding.    Taina Oakes, ADITI-BC  [facility]

## 2021-08-17 NOTE — PATIENT INSTRUCTIONS
EKG unchanged since last with low voltage in AVL  Xray done in clinic negative for effusion, cardiomyopathy  Negative trop and ddimer results today  Will call if thyroid stimulating hormone or cbc is abnormal  Should follow-up with primary care provider if symptoms linger in a couple days  ER if severe chest pain, shortness of breath, palpitations, confusion, dizziness, horrible headache, weakness

## 2021-08-19 ENCOUNTER — NURSE TRIAGE (OUTPATIENT)
Dept: NURSING | Facility: CLINIC | Age: 41
End: 2021-08-19

## 2021-08-19 ENCOUNTER — OFFICE VISIT (OUTPATIENT)
Dept: URGENT CARE | Facility: URGENT CARE | Age: 41
End: 2021-08-19
Payer: COMMERCIAL

## 2021-08-19 VITALS
WEIGHT: 185 LBS | SYSTOLIC BLOOD PRESSURE: 114 MMHG | RESPIRATION RATE: 15 BRPM | BODY MASS INDEX: 27.32 KG/M2 | DIASTOLIC BLOOD PRESSURE: 64 MMHG | HEART RATE: 82 BPM | TEMPERATURE: 99 F | OXYGEN SATURATION: 99 %

## 2021-08-19 DIAGNOSIS — R35.0 URINARY FREQUENCY: ICD-10-CM

## 2021-08-19 DIAGNOSIS — R63.1 INCREASED THIRST: Primary | ICD-10-CM

## 2021-08-19 LAB
ALBUMIN UR-MCNC: NEGATIVE MG/DL
APPEARANCE UR: CLEAR
BILIRUB UR QL STRIP: NEGATIVE
COLOR UR AUTO: YELLOW
ERYTHROCYTE [SEDIMENTATION RATE] IN BLOOD BY WESTERGREN METHOD: 6 MM/HR (ref 0–20)
GLUCOSE BLD-MCNC: 78 MG/DL (ref 79–116)
GLUCOSE UR STRIP-MCNC: NEGATIVE MG/DL
HGB UR QL STRIP: NEGATIVE
KETONES UR STRIP-MCNC: NEGATIVE MG/DL
LEUKOCYTE ESTERASE UR QL STRIP: NEGATIVE
NITRATE UR QL: NEGATIVE
PH UR STRIP: 6.5 [PH] (ref 5–7)
SP GR UR STRIP: 1.02 (ref 1–1.03)
UROBILINOGEN UR STRIP-ACNC: 0.2 E.U./DL

## 2021-08-19 PROCEDURE — 81003 URINALYSIS AUTO W/O SCOPE: CPT | Performed by: NURSE PRACTITIONER

## 2021-08-19 PROCEDURE — 99214 OFFICE O/P EST MOD 30 MIN: CPT | Performed by: NURSE PRACTITIONER

## 2021-08-19 PROCEDURE — 36415 COLL VENOUS BLD VENIPUNCTURE: CPT | Performed by: NURSE PRACTITIONER

## 2021-08-19 PROCEDURE — 80053 COMPREHEN METABOLIC PANEL: CPT | Performed by: NURSE PRACTITIONER

## 2021-08-19 PROCEDURE — 85652 RBC SED RATE AUTOMATED: CPT | Performed by: NURSE PRACTITIONER

## 2021-08-19 PROCEDURE — 82947 ASSAY GLUCOSE BLOOD QUANT: CPT | Mod: 59 | Performed by: NURSE PRACTITIONER

## 2021-08-19 NOTE — TELEPHONE ENCOUNTER
Dizziness. Heart racing. Shortness of breath.  Seen in  on 8/17/21.    Wondering if her IBS flare could have caused the above symptoms.  Asked if her blood glucose was tested when in . It was not.    Is leaving [facility] 8/22.  Wondering if she can be tested for diabetes before she leaves [facility]. She's also noted excessive thirst and blurred vision.    Leaving [facility] Sunday. Will be in a remote area. Would like to be tested prior to leaving.    Recommended appointment.  Transferred to scheduling.    COVID 19 Nurse Triage Plan/Patient Instructions    Please be aware that novel coronavirus (COVID-19) may be circulating in the community. If you develop symptoms such as fever, cough, or SOB or if you have concerns about the presence of another infection including coronavirus (COVID-19), please contact your health care provider or visit [email or web address].     Disposition/Instructions    In-Person Visit with provider recommended. Reference Visit Selection Guide.    Thank you for taking steps to prevent the spread of this virus.  o Limit your contact with others.  o Wear a simple mask to cover your cough.  o Wash your hands well and often.    Resources    [facility]: About COVID-19: [email or web address]    CDC: What to Do If You're Sick: www.cdc.gov/coronavirus/2019-ncov/about/steps-when-sick.html    CDC: Ending Home Isolation: www.cdc.gov/coronavirus/2019-ncov/hcp/disposition-in-home-patients.html     CDC: Caring for Someone: www.cdc.gov/coronavirus/2019-ncov/if-you-are-sick/care-for-someone.html     [facility]: Interim Guidance for Hospital Discharge to Home: www.health.[facility].mn.us/diseases/coronavirus/hcp/hospdischarge.pdf    [facility] clinical trials (COVID-19 research studies): [email or web address]-trials     Below are the COVID-19 hotlines at the [facility] ([facility]). Interpreters are available.   o For health questions: Call 193-743-8645 or  1-753.513.4273 (7 a.m. to 7 p.m.)  o For questions about schools and childcare: Call [phone] or 1-843.349.3895 (7 a.m. to 7 p.m.)    Additional Information    Information only question and nurse able to answer    Protocols used: INFORMATION ONLY CALL - NO TRIAGE-A-OH    Zainab ROA RN [facility]

## 2021-08-19 NOTE — PROGRESS NOTES
Assessment & Plan     Increased thirst  - Comprehensive metabolic panel (BMP + Alb, Alk Phos, ALT, AST, Total. Bili, TP) pending   - ESR: Erythrocyte sedimentation rate 6  - Glucose, whole blood 78    Urinary frequency  - UA Macro with Reflex to Micro and Culture - lab collect   UA RESULTS:  Recent Labs   Lab Test 08/19/21  1634 01/26/20  1333   COLOR Yellow Yellow   APPEARANCE Clear Clear   URINEGLC Negative Negative   URINEBILI Negative Small*   URINEKETONE Negative 40*   SG 1.020 1.020   UBLD Negative Trace*   URINEPH 6.5 6.5   PROTEIN Negative Negative   UROBILINOGEN 0.2 0.2   NITRITE Negative Negative   LEUKEST Negative Negative   RBCU  --  O - 2   WBCU  --  0 - 5     NP called patient at 20:57 pm related to CMP still in process and to follow up with primary this week and if increased weakness, fatigue or if increase palpitations lightheaded or blurred vision to go to emergency department.  Luma Zhou NP    [facility] [place]    Dotty Parish is a 41 year old female who presents to clinic today for the following health issues:  Chief Complaint   Patient presents with     Urgent Care     Tachycardia     feels heart racing when she eats, excessive thirst and feeling awful. Was here Tuesday and all was normal.      HPI  Марина Whitehead is a 41 year old female less racing, extreme thirst and fatigue, less lightheaded with changing positions, no history of the room spinning and less nausea, fatigue and she not feeling well. Patient states the blurred vision has gotten less than the last time. Patient states she does not have blurred vision now. Patient had 3 bouts of diarrhea last week , and last episode one 8/14/2021. Patient states intermittently after eating feeling sometimes increase palpations.Patient states she has less intermittent palpitations, shortness of breath, headache, nausea, diarrhea, blurred vision than the last time she was here on 8/16/2021.    She has been  working out lifting heavily for 5 months and taking pre workout. She decreased her caffeine. LMP 8/16/2021. She had been taking an herbal supplement last Friday 8/13/2021 and Saturday 8/14/2021but discontinue.  Patient is able to drink fluids.     Her dad 55 or 58 years just had a MI.     She is on wellbutrin and zoloft for anxiety    Patient states she wonders if she has diabetes or an electrolyte imbalance and will the test check her kidneys?    When patient was last seen on 8/17/2021 she had a negative chest xray, EKG normal sinus rhythm, normal    D-dimer and troponin, TSH 2.47, and CBC.     Review of Systems  CONSTITUTIONAL: NEGATIVE for no fever, yes chills, change in gaining weight in 10 pounds since spring 2021 and working out 5 times a week lifting weight one hour  INTEGUMENTARY/SKIN: NEGATIVE for worrisome rashes, moles or lesions  EYES: NEGATIVE for intermittent blurred vision but not now  ENT/MOUTH: NEGATIVE for ear, mouth and throat problems  RESP: NEGATIVE for significant cough or no increase SOB  CV: NEGATIVE for chest pain, peripheral edema  GI: NEGATIVE patient has had abdominal pain 4 to 5/10 but she states now 1/10 gas pain, normal BM so far after the episodes of diarrhea.   : NEGATIVE  no dysuria, but has urinary frequency  MUSCULOSKELETAL: Patient has significant body aches  NEURO: NEGATIVE for paresthesias but states she has generalized weakness  ENDOCRINE: NEGATIVE for temperature intolerance, skin/hair changes  HEME: NEGATIVE for bleeding problems      Objective    /64   Pulse 82   Temp 99  F (37.2  C)   Resp 15   Wt 83.9 kg (185 lb)   LMP 08/15/2021   SpO2 99%   Breastfeeding No   BMI 27.32 kg/m    Physical Exam  Vitals and nursing note reviewed.   Constitutional:       Appearance: Normal appearance.   HENT:      Head: Normocephalic and atraumatic.      Nose: Nose normal.      Mouth/Throat:      Mouth: Mucous membranes are dry.      Pharynx: Oropharynx is clear.   Eyes:       Conjunctiva/sclera: Conjunctivae normal.   Cardiovascular:      Rate and Rhythm: Normal rate and regular rhythm.      Pulses: Normal pulses.      Heart sounds: Normal heart sounds.   Pulmonary:      Effort: Pulmonary effort is normal.      Breath sounds: Normal breath sounds.   Abdominal:      General: Abdomen is flat. Bowel sounds are normal.      Palpations: Abdomen is soft.      Tenderness: There is no abdominal tenderness. There is no guarding or rebound.   Skin:     General: Skin is warm and dry.   Neurological:      Mental Status: She is alert.      Cranial Nerves: No cranial nerve deficit.      Coordination: Coordination normal.      Gait: Gait normal.      Deep Tendon Reflexes: Reflexes normal.      Comments: Cranial II-XII intact and able to do heel toe walk in the exam room    Psychiatric:         Mood and Affect: Mood normal.

## 2021-08-19 NOTE — PATIENT INSTRUCTIONS
Emergency room if severe chest pain, shortness of breath, palpitations, confusion, dizziness, horrible headache, weakness   Follow up with primary care provider with any problems, questions or concerns or if symptoms worsen or fail to improve. Patient agreed to plan and verbalized understanding.

## 2021-08-19 NOTE — TELEPHONE ENCOUNTER
Patient calling back to say there weren't any appointments available for a while, and that she wasn't able to get an appointment.      Advised patient if no appointments available, she should be seen in UC or WIC.      Elisa Chadwick RN  [facility]

## 2021-08-20 ENCOUNTER — NURSE TRIAGE (OUTPATIENT)
Dept: NURSING | Facility: CLINIC | Age: 41
End: 2021-08-20

## 2021-08-20 LAB
ALBUMIN SERPL-MCNC: 3.9 G/DL (ref 3.4–5)
ALP SERPL-CCNC: 79 U/L (ref 40–150)
ALT SERPL W P-5'-P-CCNC: 20 U/L (ref 0–50)
ANION GAP SERPL CALCULATED.3IONS-SCNC: 2 MMOL/L (ref 3–14)
AST SERPL W P-5'-P-CCNC: 18 U/L (ref 0–45)
BILIRUB SERPL-MCNC: 0.4 MG/DL (ref 0.2–1.3)
BUN SERPL-MCNC: 14 MG/DL (ref 7–30)
CALCIUM SERPL-MCNC: 8.8 MG/DL (ref 8.5–10.1)
CHLORIDE BLD-SCNC: 105 MMOL/L (ref 94–109)
CO2 SERPL-SCNC: 30 MMOL/L (ref 20–32)
CREAT SERPL-MCNC: 1.12 MG/DL (ref 0.52–1.04)
GFR SERPL CREATININE-BSD FRML MDRD: 61 ML/MIN/1.73M2
GLUCOSE BLD-MCNC: 81 MG/DL (ref 70–99)
POTASSIUM BLD-SCNC: 4.3 MMOL/L (ref 3.4–5.3)
PROT SERPL-MCNC: 6.9 G/DL (ref 6.8–8.8)
SODIUM SERPL-SCNC: 137 MMOL/L (ref 133–144)

## 2021-08-20 NOTE — TELEPHONE ENCOUNTER
Pt is calling.    Was seen in urgent care last night.  Test results came in and were abnormal. She is very concerned about the elevated creatinine, glucose whole blood being low. All other blood work and urine from both urgent care visits were normal from 08/17/2021 and yesterday.  Results have not been reviewed by the urgent care provider yet.  I advised her that they will need to be reviewed by the provider before we can give any results or recommendations.  Pt is very anxious and upset. She has been sick x 1 week and has missed 1 week of work. Not getting any better.  Heart racing, weakness, nausea, fatigue, blurry vision off and on.  Not feeling well. Feels that she is being dismissed and told it is anxiety, and she is frustrated with this.  Would like to speak to someone ASAP.  She is requesting that I send a message to her PCP and have them review the results and call her.  I advised her that I would send an urgent message to her PCP/care team, and the urgent care as well, since they ordered the testing, and have them give her a call back.    Angelica Schneider RN  [facility] Nurse Advisor  8/20/2021 at 9:07 AM    Reason for Disposition    Caller requesting lab results    Additional Information    Negative: Lab calling with strep throat test results and triager can call in prescription    Negative: Lab calling with urinalysis test results and triager can call in prescription    Negative: Medication questions    Negative: ED call to PCP    Negative: Physician call to PCP    Negative: Call about patient who is currently hospitalized    Negative: Lab or radiology calling with CRITICAL test results    Negative: [1] Prescription not at pharmacy AND [2] was prescribed today by PCP    Negative: [1] Follow-up call from patient regarding patient's clinical status AND [2] information urgent    Negative: [1] Caller requests to speak ONLY to PCP AND [2] urgent question    Negative: [1] Caller requests to speak to PCP  now AND [2] won't tell us reason for call  (Exception: if 10 pm to 6 am, caller must first discuss reason for the call)    Negative: Notification of hospital admission    Negative: Notification of death    Protocols used: PCP CALL - NO TRIAGE-A-AH

## 2021-08-20 NOTE — TELEPHONE ENCOUNTER
Patient given message from Zee Merrill.  Will see how her trip goes and if she feels she needs follow-up.  Alexandrea Rivera RN  [facility]

## 2021-10-02 ENCOUNTER — HEALTH MAINTENANCE LETTER (OUTPATIENT)
Age: 41
End: 2021-10-02

## 2022-03-03 ENCOUNTER — OFFICE VISIT (OUTPATIENT)
Dept: FAMILY MEDICINE | Facility: CLINIC | Age: 42
End: 2022-03-03
Payer: COMMERCIAL

## 2022-03-03 VITALS
BODY MASS INDEX: 27.32 KG/M2 | OXYGEN SATURATION: 97 % | TEMPERATURE: 98.2 F | HEART RATE: 77 BPM | HEIGHT: 69 IN | RESPIRATION RATE: 14 BRPM | DIASTOLIC BLOOD PRESSURE: 77 MMHG | SYSTOLIC BLOOD PRESSURE: 122 MMHG

## 2022-03-03 DIAGNOSIS — M46.1 SACROILIITIS (H): ICD-10-CM

## 2022-03-03 DIAGNOSIS — Z13.29 SCREENING FOR THYROID DISORDER: ICD-10-CM

## 2022-03-03 DIAGNOSIS — F41.1 GENERALIZED ANXIETY DISORDER: ICD-10-CM

## 2022-03-03 DIAGNOSIS — Z12.4 CERVICAL CANCER SCREENING: ICD-10-CM

## 2022-03-03 DIAGNOSIS — Z00.00 ROUTINE GENERAL MEDICAL EXAMINATION AT A HEALTH CARE FACILITY: Primary | ICD-10-CM

## 2022-03-03 DIAGNOSIS — F33.41 RECURRENT MAJOR DEPRESSIVE DISORDER, IN PARTIAL REMISSION (H): ICD-10-CM

## 2022-03-03 DIAGNOSIS — Z12.31 VISIT FOR SCREENING MAMMOGRAM: ICD-10-CM

## 2022-03-03 DIAGNOSIS — R10.9 ABDOMINAL DISCOMFORT: ICD-10-CM

## 2022-03-03 LAB
ERYTHROCYTE [DISTWIDTH] IN BLOOD BY AUTOMATED COUNT: 12.1 % (ref 10–15)
HCT VFR BLD AUTO: 43.2 % (ref 35–47)
HGB BLD-MCNC: 14.3 G/DL (ref 11.7–15.7)
MCH RBC QN AUTO: 31.7 PG (ref 26.5–33)
MCHC RBC AUTO-ENTMCNC: 33.1 G/DL (ref 31.5–36.5)
MCV RBC AUTO: 96 FL (ref 78–100)
PLATELET # BLD AUTO: 318 10E3/UL (ref 150–450)
RBC # BLD AUTO: 4.51 10E6/UL (ref 3.8–5.2)
WBC # BLD AUTO: 8.3 10E3/UL (ref 4–11)

## 2022-03-03 PROCEDURE — 80048 BASIC METABOLIC PNL TOTAL CA: CPT | Performed by: NURSE PRACTITIONER

## 2022-03-03 PROCEDURE — 99214 OFFICE O/P EST MOD 30 MIN: CPT | Mod: 25 | Performed by: NURSE PRACTITIONER

## 2022-03-03 PROCEDURE — 99396 PREV VISIT EST AGE 40-64: CPT | Performed by: NURSE PRACTITIONER

## 2022-03-03 PROCEDURE — 80061 LIPID PANEL: CPT | Performed by: NURSE PRACTITIONER

## 2022-03-03 PROCEDURE — 36415 COLL VENOUS BLD VENIPUNCTURE: CPT | Performed by: NURSE PRACTITIONER

## 2022-03-03 PROCEDURE — 96127 BRIEF EMOTIONAL/BEHAV ASSMT: CPT | Mod: 59 | Performed by: NURSE PRACTITIONER

## 2022-03-03 PROCEDURE — 84443 ASSAY THYROID STIM HORMONE: CPT | Performed by: NURSE PRACTITIONER

## 2022-03-03 PROCEDURE — 87624 HPV HI-RISK TYP POOLED RSLT: CPT | Performed by: NURSE PRACTITIONER

## 2022-03-03 PROCEDURE — G0145 SCR C/V CYTO,THINLAYER,RESCR: HCPCS | Performed by: NURSE PRACTITIONER

## 2022-03-03 PROCEDURE — 85027 COMPLETE CBC AUTOMATED: CPT | Performed by: NURSE PRACTITIONER

## 2022-03-03 RX ORDER — BUPROPION HYDROCHLORIDE 300 MG/1
300 TABLET ORAL EVERY MORNING
Qty: 90 TABLET | Refills: 3 | Status: SHIPPED | OUTPATIENT
Start: 2022-03-03 | End: 2023-04-07

## 2022-03-03 RX ORDER — SERTRALINE HYDROCHLORIDE 100 MG/1
100 TABLET, FILM COATED ORAL DAILY
Qty: 90 TABLET | Refills: 3 | Status: SHIPPED | OUTPATIENT
Start: 2022-03-03 | End: 2023-04-07

## 2022-03-03 ASSESSMENT — PATIENT HEALTH QUESTIONNAIRE - PHQ9
SUM OF ALL RESPONSES TO PHQ QUESTIONS 1-9: 3
10. IF YOU CHECKED OFF ANY PROBLEMS, HOW DIFFICULT HAVE THESE PROBLEMS MADE IT FOR YOU TO DO YOUR WORK, TAKE CARE OF THINGS AT HOME, OR GET ALONG WITH OTHER PEOPLE: NOT DIFFICULT AT ALL
SUM OF ALL RESPONSES TO PHQ QUESTIONS 1-9: 3

## 2022-03-03 ASSESSMENT — ANXIETY QUESTIONNAIRES
4. TROUBLE RELAXING: SEVERAL DAYS
GAD7 TOTAL SCORE: 4
1. FEELING NERVOUS, ANXIOUS, OR ON EDGE: SEVERAL DAYS
2. NOT BEING ABLE TO STOP OR CONTROL WORRYING: NOT AT ALL
3. WORRYING TOO MUCH ABOUT DIFFERENT THINGS: NOT AT ALL
GAD7 TOTAL SCORE: 4
GAD7 TOTAL SCORE: 4
7. FEELING AFRAID AS IF SOMETHING AWFUL MIGHT HAPPEN: NOT AT ALL
5. BEING SO RESTLESS THAT IT IS HARD TO SIT STILL: NOT AT ALL
7. FEELING AFRAID AS IF SOMETHING AWFUL MIGHT HAPPEN: NOT AT ALL
6. BECOMING EASILY ANNOYED OR IRRITABLE: MORE THAN HALF THE DAYS

## 2022-03-03 ASSESSMENT — ENCOUNTER SYMPTOMS
PARESTHESIAS: 0
HEMATOCHEZIA: 0
WEAKNESS: 0
NAUSEA: 1
FEVER: 0
FREQUENCY: 1
CHILLS: 0
NERVOUS/ANXIOUS: 0
DYSURIA: 0
BREAST MASS: 0
MYALGIAS: 0
HEARTBURN: 0
SORE THROAT: 0
HEMATURIA: 0
ABDOMINAL PAIN: 1
JOINT SWELLING: 0
CONSTIPATION: 1
SHORTNESS OF BREATH: 0
DIARRHEA: 0
DIZZINESS: 0
HEADACHES: 0
ARTHRALGIAS: 0
COUGH: 0
EYE PAIN: 0
PALPITATIONS: 0

## 2022-03-03 NOTE — PROGRESS NOTES
Answers for HPI/ROS submitted by the patient on 3/3/2022  If you checked off any problems, how difficult have these problems made it for you to do your work, take care of things at home, or get along with other people?: Not difficult at all  PHQ9 TOTAL SCORE: 3  RUBINA 7 TOTAL SCORE: 4       SUBJECTIVE:   CC: Марина Whitehead is an 42 year old woman who presents for preventive health visit.       Patient has been advised of split billing requirements and indicates understanding: Yes  Healthy Habits:     Getting at least 3 servings of Calcium per day:  Yes    Bi-annual eye exam:  Yes    Dental care twice a year:  Yes    Sleep apnea or symptoms of sleep apnea:  None    Diet:  Regular (no restrictions)    Frequency of exercise:  4-5 days/week    Duration of exercise:  45-60 minutes    Taking medications regularly:  Yes    Medication side effects:  Not applicable    PHQ-2 Total Score: 0    Additional concerns today:  No          Pt states been having a lot of cramping, especially in low back. Been having bloating and fullness and incontinence, constipation.  Certain foods aggravate her symptoms.  Feels like dairy contributes to her symptoms but she will initially tolerate it.    Periods are regular but she is notes shorter time periods between her cycles, approximately 23 days.  Feels this contributes to some of her back pain and abdominal cramping.    Mood has been stable  PHQ 5/29/2020 3/22/2021 3/3/2022   PHQ-9 Total Score 4 8 3   Q9: Thoughts of better off dead/self-harm past 2 weeks Not at all Not at all Not at all       Wondering about heart health.     Scheduling a mammo.    Today's PHQ-2 Score:   PHQ-2 ( 1999 [employer]) 3/3/2022   Q1: Little interest or pleasure in doing things 0   Q2: Feeling down, depressed or hopeless 0   PHQ-2 Score 0   PHQ-2 Total Score (12-17 Years)- Positive if 3 or more points; Administer PHQ-A if positive -   Q1: Little interest or pleasure in doing things Not at all   Q2: Feeling down,  depressed or hopeless Not at all   PHQ-2 Score 0       Abuse: Current or Past (Physical, Sexual or Emotional) - No  Do you feel safe in your environment? Yes        Social History     Tobacco Use     Smoking status: Never Smoker     Smokeless tobacco: Never Used   Substance Use Topics     Alcohol use: Yes     Alcohol/week: 2.0 standard drinks     If you drink alcohol do you typically have >3 drinks per day or >7 drinks per week? No    Alcohol Use 3/3/2022   Prescreen: >3 drinks/day or >7 drinks/week? No   Prescreen: >3 drinks/day or >7 drinks/week? -   AUDIT SCORE  -       Reviewed orders with patient.  Reviewed health maintenance and updated orders accordingly - Yes  Lab work is in process    Breast Cancer Screening:    Breast CA Risk Assessment (FHS-7) 3/3/2022   Do you have a family history of breast, colon, or ovarian cancer? No / Unknown         Mammogram Screening - Offered annual screening and updated Health Maintenance for mutual plan based on risk factor consideration    Pertinent mammograms are reviewed under the imaging tab.    History of abnormal Pap smear: Yes- over 8 years ago.  PAP / HPV Latest Ref Rng & Units 3/12/2019 3/10/2015   PAP (Historical) - NIL NIL   HPV16 NEG:Negative Negative -   HPV18 NEG:Negative Negative -   HRHPV NEG:Negative Negative -     Reviewed and updated as needed this visit by clinical staff                  Reviewed and updated as needed this visit by Provider                     Review of Systems   Constitutional: Negative for chills and fever.   HENT: Negative for congestion, ear pain, hearing loss and sore throat.    Eyes: Negative for pain and visual disturbance.   Respiratory: Negative for cough and shortness of breath.    Cardiovascular: Negative for chest pain, palpitations and peripheral edema.   Gastrointestinal: Positive for abdominal pain, constipation and nausea. Negative for diarrhea, heartburn and hematochezia.   Breasts:  Positive for tenderness. Negative for  breast mass and discharge.   Genitourinary: Positive for frequency, pelvic pain and urgency. Negative for dysuria, genital sores, hematuria, vaginal bleeding and vaginal discharge.   Musculoskeletal: Negative for arthralgias, joint swelling and myalgias.   Skin: Negative for rash.   Neurological: Negative for dizziness, weakness, headaches and paresthesias.   Psychiatric/Behavioral: Negative for mood changes. The patient is not nervous/anxious.           OBJECTIVE:   There were no vitals taken for this visit.  Physical Exam  GENERAL: healthy, alert and no distress  EYES: Eyes grossly normal to inspection, PERRL and conjunctivae and sclerae normal  HENT: ear canals and TM's normal, nose and mouth without ulcers or lesions  NECK: no adenopathy, no asymmetry, masses, or scars and thyroid normal to palpation  RESP: lungs clear to auscultation - no rales, rhonchi or wheezes  BREAST: normal without masses, tenderness or nipple discharge and no palpable axillary masses or adenopathy  CV: regular rate and rhythm, normal S1 S2, no S3 or S4, no murmur, click or rub, no peripheral edema and peripheral pulses strong  ABDOMEN: soft, nontender, no hepatosplenomegaly, no masses and bowel sounds normal   (female): normal female external genitalia, normal urethral meatus, vaginal mucosa pink, moist, well rugated, and normal cervix/adnexa/uterus without masses or discharge  MS: no gross musculoskeletal defects noted, no edema  SKIN: no suspicious lesions or rashes  NEURO: Normal strength and tone, mentation intact and speech normal  PSYCH: mentation appears normal, affect normal/bright        ASSESSMENT/PLAN:   (Z00.00) Routine general medical examination at a health care facility  (primary encounter diagnosis)  Comment: Reviewed medical history and health maintenance.  Mammogram ordered, up-to-date on immunizations.  Plan: Lipid panel reflex to direct LDL Non-fasting,         Basic metabolic panel  (Ca, Cl, CO2, Creat,          "Gluc, K, Na, BUN), CBC with platelets            (M46.1) Sacroiliitis (H)  Comment: Stable chronic issue.    Plan: Continue to monitor, refer to ortho/pt if symptoms worsen.      (F33.41) Recurrent major depressive disorder, in partial remission (H)  Comment:  Stable, tolerating med, no changes at this time  Plan: sertraline (ZOLOFT) 100 MG tablet, buPROPion         (WELLBUTRIN XL) 300 MG 24 hr tablet            (F41.1) Generalized anxiety disorder  Comment:  Stable, tolerating med, no changes at this time  Plan: buPROPion (WELLBUTRIN XL) 300 MG 24 hr tablet          (R10.9)  Abdominal discomfort  Comment: We discussed creating a food journal and tracking foods that she finds bothersome and avoiding those triggers.. If her symptoms continue or worsen we can refer to GI for additional work-up      (Z12.4) Cervical cancer screening  Comment:  Plan: Pap Screen with HPV - recommended age 30 - 65         years            (Z13.29) Screening for thyroid disorder  Comment:   Plan: TSH with free T4 reflex            (Z12.31) Visit for screening mammogram  Comment:   Plan: *MA Screening Digital Bilateral                  COUNSELING:  Reviewed preventive health counseling, as reflected in patient instructions    Estimated body mass index is 27.32 kg/m  as calculated from the following:    Height as of 8/17/21: 1.753 m (5' 9\").    Weight as of 8/19/21: 83.9 kg (185 lb).    Weight management plan: Discussed healthy diet and exercise guidelines    She reports that she has never smoked. She has never used smokeless tobacco.      Counseling Resources:  ATP IV Guidelines  Pooled Cohorts Equation Calculator  Breast Cancer Risk Calculator  BRCA-Related Cancer Risk Assessment: FHS-7 Tool  FRAX Risk Assessment  ICSI Preventive Guidelines  Dietary Guidelines for Americans, 2010  USDA's MyPlate  ASA Prophylaxis  Lung CA Screening    DAVIN Ayala CNP  [facility]    "

## 2022-03-04 ASSESSMENT — PATIENT HEALTH QUESTIONNAIRE - PHQ9: SUM OF ALL RESPONSES TO PHQ QUESTIONS 1-9: 3

## 2022-03-04 ASSESSMENT — ANXIETY QUESTIONNAIRES: GAD7 TOTAL SCORE: 4

## 2022-03-05 LAB
ANION GAP SERPL CALCULATED.3IONS-SCNC: 6 MMOL/L (ref 3–14)
BUN SERPL-MCNC: 15 MG/DL (ref 7–30)
CALCIUM SERPL-MCNC: 8.9 MG/DL (ref 8.5–10.1)
CHLORIDE BLD-SCNC: 109 MMOL/L (ref 94–109)
CHOLEST SERPL-MCNC: 215 MG/DL
CO2 SERPL-SCNC: 24 MMOL/L (ref 20–32)
CREAT SERPL-MCNC: 0.88 MG/DL (ref 0.52–1.04)
FASTING STATUS PATIENT QL REPORTED: NO
GFR SERPL CREATININE-BSD FRML MDRD: 84 ML/MIN/1.73M2
GLUCOSE BLD-MCNC: 67 MG/DL (ref 70–99)
HDLC SERPL-MCNC: 50 MG/DL
LDLC SERPL CALC-MCNC: 143 MG/DL
NONHDLC SERPL-MCNC: 165 MG/DL
POTASSIUM BLD-SCNC: 4.4 MMOL/L (ref 3.4–5.3)
SODIUM SERPL-SCNC: 139 MMOL/L (ref 133–144)
TRIGL SERPL-MCNC: 110 MG/DL
TSH SERPL DL<=0.005 MIU/L-ACNC: 1.96 MU/L (ref 0.4–4)

## 2022-03-07 LAB
BKR LAB AP GYN ADEQUACY: NORMAL
BKR LAB AP GYN INTERPRETATION: NORMAL
BKR LAB AP HPV REFLEX: NORMAL
BKR LAB AP PREVIOUS ABNORMAL: NORMAL
PATH REPORT.COMMENTS IMP SPEC: NORMAL
PATH REPORT.COMMENTS IMP SPEC: NORMAL
PATH REPORT.RELEVANT HX SPEC: NORMAL

## 2022-03-09 LAB
HUMAN PAPILLOMA VIRUS 16 DNA: NEGATIVE
HUMAN PAPILLOMA VIRUS 18 DNA: NEGATIVE
HUMAN PAPILLOMA VIRUS FINAL DIAGNOSIS: NORMAL
HUMAN PAPILLOMA VIRUS OTHER HR: NEGATIVE

## 2022-03-10 PROBLEM — R87.610 ATYPICAL SQUAMOUS CELLS OF UNDETERMINED SIGNIFICANCE (ASCUS) ON PAPANICOLAOU SMEAR OF CERVIX: Status: ACTIVE | Noted: 2019-03-19

## 2022-03-11 ENCOUNTER — E-VISIT (OUTPATIENT)
Dept: FAMILY MEDICINE | Facility: CLINIC | Age: 42
End: 2022-03-11
Payer: COMMERCIAL

## 2022-03-11 DIAGNOSIS — L98.9 SKIN LESION: Primary | ICD-10-CM

## 2022-03-11 PROCEDURE — 99421 OL DIG E/M SVC 5-10 MIN: CPT | Performed by: NURSE PRACTITIONER

## 2022-03-11 NOTE — PATIENT INSTRUCTIONS
Dear Марина Whitehead    Please schedule with dermatology at your convenience to complete a skin check.  I sent you a message via [employer], please let me know if you have other questions.    Thanks for choosing us as your health care partner,    DAVIN Ayala CNP

## 2022-03-17 ENCOUNTER — TRANSFERRED RECORDS (OUTPATIENT)
Dept: HEALTH INFORMATION MANAGEMENT | Facility: CLINIC | Age: 42
End: 2022-03-17
Payer: COMMERCIAL

## 2022-05-23 ENCOUNTER — ANCILLARY PROCEDURE (OUTPATIENT)
Dept: MAMMOGRAPHY | Facility: CLINIC | Age: 42
End: 2022-05-23
Attending: NURSE PRACTITIONER
Payer: COMMERCIAL

## 2022-05-23 DIAGNOSIS — Z12.31 VISIT FOR SCREENING MAMMOGRAM: ICD-10-CM

## 2022-05-23 PROCEDURE — 77067 SCR MAMMO BI INCL CAD: CPT | Mod: TC | Performed by: RADIOLOGY

## 2022-05-23 PROCEDURE — 77063 BREAST TOMOSYNTHESIS BI: CPT | Mod: TC | Performed by: RADIOLOGY

## 2022-05-24 NOTE — RESULT ENCOUNTER NOTE
Alirio Parish,  I am happy to see that you completed your mammogram and that your result is normal!  Pili Galvan MD for Maya Wen NP

## 2022-05-31 DIAGNOSIS — F33.41 RECURRENT MAJOR DEPRESSIVE DISORDER, IN PARTIAL REMISSION (H): ICD-10-CM

## 2022-05-31 DIAGNOSIS — F41.1 GENERALIZED ANXIETY DISORDER: ICD-10-CM

## 2022-06-01 RX ORDER — SERTRALINE HYDROCHLORIDE 100 MG/1
TABLET, FILM COATED ORAL
Qty: 135 TABLET | OUTPATIENT
Start: 2022-06-01

## 2022-06-01 RX ORDER — BUPROPION HYDROCHLORIDE 300 MG/1
TABLET ORAL
Qty: 90 TABLET | Refills: 3 | OUTPATIENT
Start: 2022-06-01

## 2022-09-02 ENCOUNTER — OFFICE VISIT (OUTPATIENT)
Dept: URGENT CARE | Facility: URGENT CARE | Age: 42
End: 2022-09-02
Payer: COMMERCIAL

## 2022-09-02 ENCOUNTER — E-VISIT (OUTPATIENT)
Dept: URGENT CARE | Facility: URGENT CARE | Age: 42
End: 2022-09-02
Payer: COMMERCIAL

## 2022-09-02 VITALS
TEMPERATURE: 98.1 F | HEART RATE: 69 BPM | BODY MASS INDEX: 28.44 KG/M2 | SYSTOLIC BLOOD PRESSURE: 120 MMHG | HEIGHT: 69 IN | RESPIRATION RATE: 16 BRPM | OXYGEN SATURATION: 98 % | WEIGHT: 192 LBS | DIASTOLIC BLOOD PRESSURE: 82 MMHG

## 2022-09-02 DIAGNOSIS — R07.0 THROAT PAIN: ICD-10-CM

## 2022-09-02 DIAGNOSIS — M54.2 NECK PAIN ON RIGHT SIDE: Primary | ICD-10-CM

## 2022-09-02 DIAGNOSIS — R51.9 NEW ONSET HEADACHE: Primary | ICD-10-CM

## 2022-09-02 DIAGNOSIS — R53.83 FATIGUE, UNSPECIFIED TYPE: ICD-10-CM

## 2022-09-02 LAB
BASOPHILS # BLD AUTO: 0.1 10E3/UL (ref 0–0.2)
BASOPHILS NFR BLD AUTO: 1 %
DEPRECATED S PYO AG THROAT QL EIA: NEGATIVE
EOSINOPHIL # BLD AUTO: 0.3 10E3/UL (ref 0–0.7)
EOSINOPHIL NFR BLD AUTO: 3 %
ERYTHROCYTE [DISTWIDTH] IN BLOOD BY AUTOMATED COUNT: 12.7 % (ref 10–15)
HCT VFR BLD AUTO: 43.8 % (ref 35–47)
HGB BLD-MCNC: 14.3 G/DL (ref 11.7–15.7)
IMM GRANULOCYTES # BLD: 0 10E3/UL
IMM GRANULOCYTES NFR BLD: 0 %
LYMPHOCYTES # BLD AUTO: 2.7 10E3/UL (ref 0.8–5.3)
LYMPHOCYTES NFR BLD AUTO: 34 %
MCH RBC QN AUTO: 32.1 PG (ref 26.5–33)
MCHC RBC AUTO-ENTMCNC: 32.6 G/DL (ref 31.5–36.5)
MCV RBC AUTO: 98 FL (ref 78–100)
MONOCYTES # BLD AUTO: 0.5 10E3/UL (ref 0–1.3)
MONOCYTES NFR BLD AUTO: 7 %
MONOCYTES NFR BLD AUTO: NEGATIVE %
NEUTROPHILS # BLD AUTO: 4.3 10E3/UL (ref 1.6–8.3)
NEUTROPHILS NFR BLD AUTO: 55 %
PLATELET # BLD AUTO: 355 10E3/UL (ref 150–450)
RBC # BLD AUTO: 4.46 10E6/UL (ref 3.8–5.2)
WBC # BLD AUTO: 7.8 10E3/UL (ref 4–11)

## 2022-09-02 PROCEDURE — 99207 PR NON-BILLABLE SERV PER CHARTING: CPT | Performed by: FAMILY MEDICINE

## 2022-09-02 PROCEDURE — 87651 STREP A DNA AMP PROBE: CPT | Performed by: INTERNAL MEDICINE

## 2022-09-02 PROCEDURE — 85025 COMPLETE CBC W/AUTO DIFF WBC: CPT | Performed by: INTERNAL MEDICINE

## 2022-09-02 PROCEDURE — 99213 OFFICE O/P EST LOW 20 MIN: CPT | Performed by: INTERNAL MEDICINE

## 2022-09-02 PROCEDURE — 86308 HETEROPHILE ANTIBODY SCREEN: CPT | Performed by: INTERNAL MEDICINE

## 2022-09-02 PROCEDURE — 36415 COLL VENOUS BLD VENIPUNCTURE: CPT | Performed by: INTERNAL MEDICINE

## 2022-09-02 NOTE — PATIENT INSTRUCTIONS
Your blood counts are all normal.  Mono test and strep test are negative.    Physical exam is showing tenderness primarily in the soft tissues associated with muscles in your neck and also at the spot where the neck muscles insert at the base of your skull.  This is a common spot to get inflammation and muscle pain which can also occur in response to a viral infection.    Use heat, stretching and pain relievers (acetaminophen 1,000 mg three times daily and/or ibuprofen 600 mg three times daily).  Should see improvement over the next week.

## 2022-09-02 NOTE — PROGRESS NOTES
Assessment & Plan     Neck pain on right side  Differential diagnosis for unilateral neck pain with associated fatigue in the setting of recent URI and residual sore throat includes mastoiditis, posterior lymph node abscess, myositis, reactive myalgias, reactive enthesitis.  There is no physical exam evidence of mastoiditis or lymphadenitis and CBC is reassuring.  Most likely a reactive enthesitis or myalgia which can be managed supportively.    Throat pain  - Mononucleosis screen; Future  - Streptococcus A Rapid Screen w/Reflex to PCR - Clinic Collect  - CBC with platelets and differential; Future  - Mononucleosis screen  - CBC with platelets and differential  - Group A Streptococcus PCR Throat Swab    Fatigue, unspecified type  - Mononucleosis screen; Future  - Streptococcus A Rapid Screen w/Reflex to PCR - Clinic Collect  - CBC with platelets and differential; Future  - Mononucleosis screen  - CBC with platelets and differential  - Group A Streptococcus PCR Throat Swab    Andreas King MD  [facility] [place]    Dotty Parish is a 42 year old, presenting for the following health issues:  Urgent Care and Fatigue (Pain back of head on the Rt side x2 days. )      HPI   Chief complaint of neck pain and pain behind the ear on the right side.  Was ill with URI symptoms last week.  These have largely cleared though still noting some sore throat on the right side.  Some ear discomfort on the right side. Primary issue now is pain behind the right ear and tenderness at the base of the skull on the right side.  Pain with neck movement.  No pain with jaw opening.  Denies fevers, chills, sweats.  Denies vertigo.  Noting some increase in fatigue and feeling a little weak.  Noting some persistent post- nasal drainage.    Review of Systems   Constitutional, HEENT, cardiovascular, pulmonary, gi and gu systems are negative, except as otherwise noted.      Objective    /82   Pulse 69    "Temp 98.1  F (36.7  C) (Temporal)   Resp 16   Ht 1.753 m (5' 9\")   Wt 87.1 kg (192 lb)   SpO2 98%   BMI 28.35 kg/m    Body mass index is 28.35 kg/m .  Physical Exam   GENERAL APPEARANCE: healthy, alert and no distress  HENT: ear canals and TM's normal and moderate right-sided posterior OP erythema without tonsillar hypertrophy or masses; no maxillary, frontal or mastoid sinus tenderness to percussion over these structures; no redness or warmth of the mastoid  NECK: no adenopathy, no asymmetry, masses, or scars and thyroid normal to palpation  RESP: lungs clear to auscultation - no rales, rhonchi or wheezes  CV: regular rates and rhythm, normal S1 S2, no S3 or S4 and no murmur, click or rub  LYMPH: no cervical lymphadenopathy   MS: tender to palpation in the right sternocleidomastoid and cervical paraspinal muscles and particularly at the occipital insertion of the cervical paraspinal muscles; some increase in pain on lateral bending to the right and chin rotation    Results for orders placed or performed in visit on 09/02/22 (from the past 24 hour(s))   Streptococcus A Rapid Screen w/Reflex to PCR - Clinic Collect    Specimen: Throat; Swab   Result Value Ref Range    Group A Strep antigen Negative Negative   Mononucleosis screen   Result Value Ref Range    Mononucleosis Screen Negative Negative   CBC with platelets and differential    Narrative    The following orders were created for panel order CBC with platelets and differential.  Procedure                               Abnormality         Status                     ---------                               -----------         ------                     CBC with platelets and d...[[id number]]                      Final result                 Please view results for these tests on the individual orders.   CBC with platelets and differential   Result Value Ref Range    WBC Count 7.8 4.0 - 11.0 10e3/uL    RBC Count 4.46 3.80 - 5.20 10e6/uL    Hemoglobin 14.3 11.7 " - 15.7 g/dL    Hematocrit 43.8 35.0 - 47.0 %    MCV 98 78 - 100 fL    MCH 32.1 26.5 - 33.0 pg    MCHC 32.6 31.5 - 36.5 g/dL    RDW 12.7 10.0 - 15.0 %    Platelet Count 355 150 - 450 10e3/uL    % Neutrophils 55 %    % Lymphocytes 34 %    % Monocytes 7 %    % Eosinophils 3 %    % Basophils 1 %    % Immature Granulocytes 0 %    Absolute Neutrophils 4.3 1.6 - 8.3 10e3/uL    Absolute Lymphocytes 2.7 0.8 - 5.3 10e3/uL    Absolute Monocytes 0.5 0.0 - 1.3 10e3/uL    Absolute Eosinophils 0.3 0.0 - 0.7 10e3/uL    Absolute Basophils 0.1 0.0 - 0.2 10e3/uL    Absolute Immature Granulocytes 0.0 <=0.4 10e3/uL

## 2022-09-03 ENCOUNTER — HEALTH MAINTENANCE LETTER (OUTPATIENT)
Age: 42
End: 2022-09-03

## 2022-09-03 LAB — GROUP A STREP BY PCR: NOT DETECTED

## 2022-10-24 ENCOUNTER — THERAPY VISIT (OUTPATIENT)
Dept: PHYSICAL THERAPY | Facility: CLINIC | Age: 42
End: 2022-10-24
Payer: COMMERCIAL

## 2022-10-24 DIAGNOSIS — M25.522 LEFT ELBOW PAIN: Primary | ICD-10-CM

## 2022-10-24 PROCEDURE — 97110 THERAPEUTIC EXERCISES: CPT | Mod: GP | Performed by: PHYSICAL THERAPIST

## 2022-10-24 PROCEDURE — 97161 PT EVAL LOW COMPLEX 20 MIN: CPT | Mod: GP | Performed by: PHYSICAL THERAPIST

## 2022-10-24 PROCEDURE — 97112 NEUROMUSCULAR REEDUCATION: CPT | Mod: GP | Performed by: PHYSICAL THERAPIST

## 2022-10-31 ENCOUNTER — THERAPY VISIT (OUTPATIENT)
Dept: PHYSICAL THERAPY | Facility: CLINIC | Age: 42
End: 2022-10-31
Payer: COMMERCIAL

## 2022-10-31 DIAGNOSIS — M25.522 LEFT ELBOW PAIN: Primary | ICD-10-CM

## 2022-10-31 PROCEDURE — 97112 NEUROMUSCULAR REEDUCATION: CPT | Mod: GP | Performed by: PHYSICAL THERAPIST

## 2022-10-31 PROCEDURE — 97140 MANUAL THERAPY 1/> REGIONS: CPT | Mod: GP | Performed by: PHYSICAL THERAPIST

## 2022-11-14 ENCOUNTER — THERAPY VISIT (OUTPATIENT)
Dept: PHYSICAL THERAPY | Facility: CLINIC | Age: 42
End: 2022-11-14
Payer: COMMERCIAL

## 2022-11-14 DIAGNOSIS — M25.522 LEFT ELBOW PAIN: Primary | ICD-10-CM

## 2022-11-14 PROCEDURE — 97110 THERAPEUTIC EXERCISES: CPT | Mod: GP | Performed by: PHYSICAL THERAPIST

## 2022-11-14 PROCEDURE — 97140 MANUAL THERAPY 1/> REGIONS: CPT | Mod: GP | Performed by: PHYSICAL THERAPIST

## 2022-11-21 ENCOUNTER — THERAPY VISIT (OUTPATIENT)
Dept: PHYSICAL THERAPY | Facility: CLINIC | Age: 42
End: 2022-11-21
Payer: COMMERCIAL

## 2022-11-21 DIAGNOSIS — M25.522 LEFT ELBOW PAIN: Primary | ICD-10-CM

## 2022-11-21 PROCEDURE — 97140 MANUAL THERAPY 1/> REGIONS: CPT | Mod: GP | Performed by: PHYSICAL THERAPIST

## 2022-11-21 NOTE — PROGRESS NOTES
DISCHARGE REPORT    Progress reporting period is from 10/24/2022 to 11/21/2022. Patient seen for 4 visits.        SUBJECTIVE  Subjective changes noted by patient: Subjective: Patient states that she was feeling good and noticed she wasn't doing her exercises as much. She states that she started to feel some neck discomfort, but when she added her exercises back in, she was able to lessen the pain. She states that her arm is feeling a lot better, and is able to perform her lifts with little to no pain. If she has pain, she is able to utilize exercises to decrease discomfort. She feels ready to discharge from PT with use of HEP    Current pain level is Current Pain level: 0/10.     Previous pain level was   .   Changes in function:  Yes (See Goal flowsheet attached for changes in current functional level)  Adverse reaction to treatment or activity: None    OBJECTIVE  Changes noted in objective findings:  Yes,   Objective: Stiffness C6-C7     ASSESSMENT/PLAN  Updated problem list and treatment plan: Diagnosis 1:  Signs and symptoms consistent with cubital tunnel syndrome  Pain -  home program  Decreased function - home program  STG/LTGs have been met or progress has been made towards goals:  Yes (See Goal flow sheet completed today.)  Assessment of Progress: The patient's condition is improving.  The patient has met all of their long term goals.  Self Management Plans:  Patient has been instructed in a home treatment program.  Patient is independent in a home treatment program.  Patient  has been instructed in self management of symptoms.  Patient is independent in self management of symptoms.  I have re-evaluated this patient and find that the nature, scope, duration and intensity of the therapy is appropriate for the medical condition of the patient.  Марина continues to require the following intervention to meet STG and LTG's:  PT intervention is no longer required to meet STG/LTG.    Recommendations:  This patient  is ready to be discharged from therapy and continue their home treatment program. Patient to follow up with PT or MD as needed    Please refer to the daily flowsheet for treatment today, total treatment time and time spent performing 1:1 timed codes.

## 2022-12-30 ENCOUNTER — HOSPITAL ENCOUNTER (EMERGENCY)
Facility: CLINIC | Age: 42
Discharge: HOME | End: 2022-12-30
Attending: EMERGENCY MEDICINE | Admitting: EMERGENCY MEDICINE
Payer: COMMERCIAL

## 2022-12-30 ENCOUNTER — APPOINTMENT (OUTPATIENT)
Dept: CT IMAGING | Facility: CLINIC | Age: 42
End: 2022-12-30
Attending: EMERGENCY MEDICINE
Payer: COMMERCIAL

## 2022-12-30 VITALS
WEIGHT: 190 LBS | OXYGEN SATURATION: 99 % | DIASTOLIC BLOOD PRESSURE: 86 MMHG | SYSTOLIC BLOOD PRESSURE: 144 MMHG | BODY MASS INDEX: 28.14 KG/M2 | HEART RATE: 82 BPM | TEMPERATURE: 97 F | HEIGHT: 69 IN | RESPIRATION RATE: 16 BRPM

## 2022-12-30 DIAGNOSIS — S30.1XXA CONTUSION OF ABDOMINAL WALL: ICD-10-CM

## 2022-12-30 DIAGNOSIS — R58 ECCHYMOSIS: ICD-10-CM

## 2022-12-30 DIAGNOSIS — R10.2 SUPRAPUBIC PAIN: ICD-10-CM

## 2022-12-30 LAB
ALBUMIN UR-MCNC: NEGATIVE MG/DL
ANION GAP SERPL CALCULATED.3IONS-SCNC: 12 MMOL/L (ref 7–15)
APPEARANCE UR: CLEAR
BACTERIA #/AREA URNS HPF: ABNORMAL /HPF
BASOPHILS # BLD AUTO: 0.1 10E3/UL (ref 0–0.2)
BASOPHILS NFR BLD AUTO: 1 %
BILIRUB UR QL STRIP: NEGATIVE
BUN SERPL-MCNC: 17.9 MG/DL (ref 6–20)
CALCIUM SERPL-MCNC: 9.1 MG/DL (ref 8.6–10)
CHLORIDE SERPL-SCNC: 105 MMOL/L (ref 98–107)
COLOR UR AUTO: ABNORMAL
CREAT SERPL-MCNC: 0.94 MG/DL (ref 0.51–0.95)
DEPRECATED HCO3 PLAS-SCNC: 19 MMOL/L (ref 22–29)
EOSINOPHIL # BLD AUTO: 0.3 10E3/UL (ref 0–0.7)
EOSINOPHIL NFR BLD AUTO: 3 %
ERYTHROCYTE [DISTWIDTH] IN BLOOD BY AUTOMATED COUNT: 13.3 % (ref 10–15)
GFR SERPL CREATININE-BSD FRML MDRD: 77 ML/MIN/1.73M2
GLUCOSE SERPL-MCNC: 91 MG/DL (ref 70–99)
GLUCOSE UR STRIP-MCNC: NEGATIVE MG/DL
HCG INTACT+B SERPL-ACNC: <1 MIU/ML
HCG UR QL: NEGATIVE
HCT VFR BLD AUTO: 44.2 % (ref 35–47)
HGB BLD-MCNC: 14.5 G/DL (ref 11.7–15.7)
HGB UR QL STRIP: NEGATIVE
IMM GRANULOCYTES # BLD: 0 10E3/UL
IMM GRANULOCYTES NFR BLD: 1 %
INTERNAL QC OK POCT: NORMAL
KETONES UR STRIP-MCNC: NEGATIVE MG/DL
LEUKOCYTE ESTERASE UR QL STRIP: NEGATIVE
LYMPHOCYTES # BLD AUTO: 2.5 10E3/UL (ref 0.8–5.3)
LYMPHOCYTES NFR BLD AUTO: 30 %
MCH RBC QN AUTO: 32.7 PG (ref 26.5–33)
MCHC RBC AUTO-ENTMCNC: 32.8 G/DL (ref 31.5–36.5)
MCV RBC AUTO: 100 FL (ref 78–100)
MONOCYTES # BLD AUTO: 0.6 10E3/UL (ref 0–1.3)
MONOCYTES NFR BLD AUTO: 7 %
MUCOUS THREADS #/AREA URNS LPF: PRESENT /LPF
NEUTROPHILS # BLD AUTO: 5 10E3/UL (ref 1.6–8.3)
NEUTROPHILS NFR BLD AUTO: 58 %
NITRATE UR QL: NEGATIVE
NRBC # BLD AUTO: 0 10E3/UL
NRBC BLD AUTO-RTO: 0 /100
PH UR STRIP: 6.5 [PH] (ref 5–7)
PLATELET # BLD AUTO: 400 10E3/UL (ref 150–450)
POCT KIT EXPIRATION DATE: NORMAL
POCT KIT LOT NUMBER: NORMAL
POTASSIUM SERPL-SCNC: 4.7 MMOL/L (ref 3.4–5.3)
RBC # BLD AUTO: 4.44 10E6/UL (ref 3.8–5.2)
RBC URINE: <1 /HPF
SODIUM SERPL-SCNC: 136 MMOL/L (ref 136–145)
SP GR UR STRIP: 1.01 (ref 1–1.03)
SQUAMOUS EPITHELIAL: <1 /HPF
UROBILINOGEN UR STRIP-MCNC: NORMAL MG/DL
WBC # BLD AUTO: 8.4 10E3/UL (ref 4–11)
WBC URINE: 1 /HPF

## 2022-12-30 PROCEDURE — 99285 EMERGENCY DEPT VISIT HI MDM: CPT | Mod: 25

## 2022-12-30 PROCEDURE — 99284 EMERGENCY DEPT VISIT MOD MDM: CPT | Performed by: EMERGENCY MEDICINE

## 2022-12-30 PROCEDURE — 85014 HEMATOCRIT: CPT | Performed by: EMERGENCY MEDICINE

## 2022-12-30 PROCEDURE — 36415 COLL VENOUS BLD VENIPUNCTURE: CPT | Performed by: EMERGENCY MEDICINE

## 2022-12-30 PROCEDURE — 82310 ASSAY OF CALCIUM: CPT | Performed by: EMERGENCY MEDICINE

## 2022-12-30 PROCEDURE — 84702 CHORIONIC GONADOTROPIN TEST: CPT | Performed by: EMERGENCY MEDICINE

## 2022-12-30 PROCEDURE — 74177 CT ABD & PELVIS W/CONTRAST: CPT

## 2022-12-30 PROCEDURE — 81025 URINE PREGNANCY TEST: CPT | Performed by: EMERGENCY MEDICINE

## 2022-12-30 PROCEDURE — 250N000011 HC RX IP 250 OP 636: Performed by: EMERGENCY MEDICINE

## 2022-12-30 PROCEDURE — 81001 URINALYSIS AUTO W/SCOPE: CPT | Performed by: EMERGENCY MEDICINE

## 2022-12-30 PROCEDURE — 74177 CT ABD & PELVIS W/CONTRAST: CPT | Mod: 26 | Performed by: RADIOLOGY

## 2022-12-30 RX ORDER — IOPAMIDOL 755 MG/ML
100 INJECTION, SOLUTION INTRAVASCULAR ONCE
Status: COMPLETED | OUTPATIENT
Start: 2022-12-30 | End: 2022-12-30

## 2022-12-30 RX ADMIN — IOPAMIDOL 100 ML: 755 INJECTION, SOLUTION INTRAVENOUS at 12:50

## 2022-12-30 ASSESSMENT — ACTIVITIES OF DAILY LIVING (ADL)
ADLS_ACUITY_SCORE: 33
ADLS_ACUITY_SCORE: 35

## 2022-12-30 NOTE — ED TRIAGE NOTES
"Triage Assessment & Note:    BP (!) 144/86   Pulse 82   Temp 97  F (36.1  C) (Temporal)   Resp 16   Ht 1.753 m (5' 9\")   Wt 86.2 kg (190 lb)   LMP 12/02/2022   SpO2 99%   BMI 28.06 kg/m        Patient presents with: Pt comes ambulatory to triage with reports of abdominal pain and bruising. Pt reports acupuncture (past week) for menstrual cramps. No reports of fever, cough, SOB, CP, or travel.     Home Treatments/Remedies: Home medications    Febrile / Afebrile: afebrile    Duration of C/o: < 48 hrs    Sera Davis RN  December 30, 2022          "

## 2022-12-30 NOTE — ED PROVIDER NOTES
[place] [facility] DEPARTMENT ([facility])    22       ED PROVIDER NOTE  History     Chief Complaint   Patient presents with     Abdominal Pain     Bleeding/Bruising     HPI  Марина Whitehead is a 42 year old female with no significant PMH presenting to the ED with abdominal pain and bruising. Patient reports doing acupuncture last week for menstrual cramps. She denies fever, cough, shortness of breath, or chest pain. She does admit that she recently went skiing and she had several falls. She states it feels like her menstrual cramps but she was more concerned about the bruising, which she has not had before with acupuncture. She has not tried anything for pain and does not want anything.    Past Medical History  Past Medical History:   Diagnosis Date     Degenerative arthritis of lumbar spine      Depressive disorder      NO ACTIVE PROBLEMS      Past Surgical History:   Procedure Laterality Date      SECTION  03/19/10     [facility]  DELIVERY ONLY      Description:  Section;  Recorded: 2011;  Comments: 2010     buPROPion (WELLBUTRIN XL) 300 MG 24 hr tablet  sertraline (ZOLOFT) 100 MG tablet      Allergies   Allergen Reactions     Amoxicillin      Hives.     Penicillins      Hives.     Family History  Family History   Problem Relation Age of Onset     Hyperlipidemia Mother      Hyperlipidemia Father      Hypertension Father      Coronary Artery Disease Father      Blood Disease Maternal Grandfather         polycythemia vera     Colon Cancer Paternal Grandmother      Heart Disease Paternal Grandfather      Social History   Social History     Tobacco Use     Smoking status: Never     Smokeless tobacco: Never   Substance Use Topics     Alcohol use: Yes     Alcohol/week: 2.0 standard drinks     Drug use: No      Past medical history, past surgical history, medications, allergies, family history, and social history were reviewed with the patient. No additional pertinent  "items.       Review of Systems  A complete review of systems was performed with pertinent positives and negatives noted in the HPI, and all other systems negative.    Physical Exam   BP: (!) 144/86  Pulse: 82  Temp: 97  F (36.1  C)  Resp: 16  Height: 175.3 cm (5' 9\")  Weight: 86.2 kg (190 lb)  SpO2: 99 %  Physical Exam  Vitals and nursing note reviewed.   Constitutional:       General: She is not in acute distress.     Appearance: She is well-developed. She is not toxic-appearing.   HENT:      Head: Normocephalic and atraumatic.      Mouth/Throat:      Mouth: Mucous membranes are moist.      Pharynx: Oropharynx is clear.   Eyes:      Extraocular Movements: Extraocular movements intact.      Pupils: Pupils are equal, round, and reactive to light.   Cardiovascular:      Rate and Rhythm: Normal rate and regular rhythm.      Heart sounds: No murmur heard.    No friction rub. No gallop.   Pulmonary:      Effort: Pulmonary effort is normal.      Breath sounds: Normal breath sounds. No wheezing, rhonchi or rales.   Abdominal:      General: Abdomen is flat. Bowel sounds are normal. There is no distension.      Palpations: Abdomen is soft. There is no mass.      Tenderness: There is no abdominal tenderness. There is no right CVA tenderness, left CVA tenderness, guarding or rebound.      Hernia: No hernia is present.   Genitourinary:     Comments: Pt declined a  exam.  Skin:     General: Skin is warm and dry.      Findings: Ecchymosis present. No rash.      Comments: 3 cm in diameter circular area of ecchymosis over the suprapubic area; nontender to palpation.   Neurological:      General: No focal deficit present.      Mental Status: She is alert and oriented to person, place, and time.      Motor: No weakness.   Psychiatric:         Mood and Affect: Mood normal.         Behavior: Behavior normal.         ED Course      Procedures       The medical record was reviewed and interpreted.  Current labs reviewed and " interpreted.  Current images reviewed and interpreted: normal CT abd/pelvis.              Results for orders placed or performed during the hospital encounter of 12/30/22   CT Abdomen Pelvis w Contrast     Status: None    Narrative    EXAMINATION: CT ABDOMEN PELVIS W CONTRAST, 12/30/2022 1:00 PM    INDICATION: abdominal bruising with recent falls, r/o traumatic injury    COMPARISON STUDY: None    TECHNIQUE: CT scan of the abdomen and pelvis was performed on  multidetector CT scanner using volumetric acquisition technique and  images were reconstructed in multiple planes with variable thickness  and reviewed on dedicated workstations.     CONTRAST: iopamidol (ISOVUE-370) solution 100 mL injected IV without  oral contrast    CT scan radiation dose is optimized to minimum requisite dose using  automated dose modulation techniques.    FINDINGS:    Lower thorax: Calcified granuloma in the lingula.    Liver: No intrahepatic biliary ductal dilation.  Too small to  characterize hepatic hypodensities within segment 7 and segment 8  (4/47-19) . Scattered calcified granulomas.    Biliary System: Normal gallbladder. No extrahepatic biliary ductal  dilation.    Pancreas: No mass or pancreatic ductal dilation.    Adrenal glands: No mass or nodules    Spleen: Not enlarged. Multifocal granulomas likely sequela from  granulomatous disease.    Kidneys: No suspicious mass, obstructing calculus or hydronephrosis.  Punctate calyceal stone within the left upper pole (4/117). Too small  to characterize hypodense lesion in left superior renal pole.    Gastrointestinal tract :Normal appendix. Normal caliber small bowel.    Mesentery/peritoneum/retroperitoneum: No mass. No free air.  Trace  free fluid in the pelvis is favored physiologic.    Lymph nodes: No significant lymphadenopathy.    Vasculature: Patent major abdominal vasculature.    Pelvis: Urinary bladder is suboptimally distended. Uterus (mild  retroverted and retroflexed) and  adnexa within normal limits.    Osseous structures: No aggressive or acute osseous lesion.  Degenerative changed at L3-4 level.  Small bone islands in the sacrum  and pelvic bones.      Soft tissues: Rectus diastasis.      Impression    IMPRESSION:     1. No acute intraabdominal pathology. Trace free fluid in the  dependent pelvis is favored physiologic.    2. Left sided punctate non-obstructing nephrolithiasis.    I have personally reviewed the examination and initial interpretation  and I agree with the findings.    LAUREN ROONEY MD         SYSTEM ID:  [id number]   Basic metabolic panel     Status: Abnormal   Result Value Ref Range    Sodium 136 136 - 145 mmol/L    Potassium 4.7 3.4 - 5.3 mmol/L    Chloride 105 98 - 107 mmol/L    Carbon Dioxide (CO2) 19 (L) 22 - 29 mmol/L    Anion Gap 12 7 - 15 mmol/L    Urea Nitrogen 17.9 6.0 - 20.0 mg/dL    Creatinine 0.94 0.51 - 0.95 mg/dL    Calcium 9.1 8.6 - 10.0 mg/dL    Glucose 91 70 - 99 mg/dL    GFR Estimate 77 >60 mL/min/1.73m2   CBC with platelets and differential     Status: None   Result Value Ref Range    WBC Count 8.4 4.0 - 11.0 10e3/uL    RBC Count 4.44 3.80 - 5.20 10e6/uL    Hemoglobin 14.5 11.7 - 15.7 g/dL    Hematocrit 44.2 35.0 - 47.0 %     78 - 100 fL    MCH 32.7 26.5 - 33.0 pg    MCHC 32.8 31.5 - 36.5 g/dL    RDW 13.3 10.0 - 15.0 %    Platelet Count 400 150 - 450 10e3/uL    % Neutrophils 58 %    % Lymphocytes 30 %    % Monocytes 7 %    % Eosinophils 3 %    % Basophils 1 %    % Immature Granulocytes 1 %    NRBCs per 100 WBC 0 <1 /100    Absolute Neutrophils 5.0 1.6 - 8.3 10e3/uL    Absolute Lymphocytes 2.5 0.8 - 5.3 10e3/uL    Absolute Monocytes 0.6 0.0 - 1.3 10e3/uL    Absolute Eosinophils 0.3 0.0 - 0.7 10e3/uL    Absolute Basophils 0.1 0.0 - 0.2 10e3/uL    Absolute Immature Granulocytes 0.0 <=0.4 10e3/uL    Absolute NRBCs 0.0 10e3/uL   UA with Microscopic reflex to Culture     Status: Abnormal    Specimen: Urine, NOS   Result Value Ref Range    Color  Urine Straw Colorless, Straw, Light Yellow, Yellow    Appearance Urine Clear Clear    Glucose Urine Negative Negative mg/dL    Bilirubin Urine Negative Negative    Ketones Urine Negative Negative mg/dL    Specific Gravity Urine 1.006 1.003 - 1.035    Blood Urine Negative Negative    pH Urine 6.5 5.0 - 7.0    Protein Albumin Urine Negative Negative mg/dL    Urobilinogen Urine Normal Normal, 2.0 mg/dL    Nitrite Urine Negative Negative    Leukocyte Esterase Urine Negative Negative    Bacteria Urine Few (A) None Seen /HPF    Mucus Urine Present (A) None Seen /LPF    RBC Urine <1 <=2 /HPF    WBC Urine 1 <=5 /HPF    Squamous Epithelials Urine <1 <=1 /HPF    Narrative    Urine Culture not indicated   HCG quantitative pregnancy (blood)     Status: Normal   Result Value Ref Range    hCG Quantitative <1 <5 mIU/mL   hCG qual urine POCT     Status: Normal   Result Value Ref Range    HCG Qual Urine Negative Negative    Internal QC Check POCT Valid Valid    POCT Kit Lot Number [id number]     POCT Kit Expiration Date 2023-11-30    CBC with platelets differential     Status: None    Narrative    The following orders were created for panel order CBC with platelets differential.  Procedure                               Abnormality         Status                     ---------                               -----------         ------                     CBC with platelets and d...[[id number]]                      Final result                 Please view results for these tests on the individual orders.     Medications   iopamidol (ISOVUE-370) solution 100 mL (100 mLs Intravenous Given 12/30/22 1250)   sodium chloride (PF) 0.9% PF flush 80 mL (80 mLs Intravenous Given 12/30/22 1251)        Assessments & Plan (with Medical Decision Making)   43 yo female here with suprapubic bruising after getting acupuncture one week prior but also having several falls while skiing since then. Her abdomen is benign and nontender on exam, however, with the  bruising I do think basic labs and a CT abd/pelvis are appropriate to r/o a traumatic injury.    Labs are relatively unremarkable. CT abd/pelvis shows    I have reviewed the nursing notes. I have reviewed the findings, diagnosis, plan and need for follow up with the patient.    Medical Decision Making  The patient presented with a problem that is acute and uncomplicated.    The patient's evaluation involved:  ordering and review of 3+ test(s) (see separate area of note for details)    The patient's management involved only simple and very low risk treatment.        New Prescriptions    No medications on file       Final diagnoses:   Suprapubic pain   Ecchymosis       --  Néstor Anderson MD  [facility] EMERGENCY DEPARTMENT  12/30/2022     Néstor Anderson MD  12/30/22 [id number]

## 2022-12-30 NOTE — DISCHARGE INSTRUCTIONS
Take acetaminophen or ibuprofen as needed for pain. Follow up with your primary care provider as needed.

## 2023-03-16 ENCOUNTER — E-VISIT (OUTPATIENT)
Dept: URGENT CARE | Facility: CLINIC | Age: 43
End: 2023-03-16
Payer: COMMERCIAL

## 2023-03-16 DIAGNOSIS — N39.0 ACUTE UTI (URINARY TRACT INFECTION): Primary | ICD-10-CM

## 2023-03-16 PROCEDURE — 99421 OL DIG E/M SVC 5-10 MIN: CPT | Performed by: PHYSICIAN ASSISTANT

## 2023-03-16 RX ORDER — NITROFURANTOIN 25; 75 MG/1; MG/1
100 CAPSULE ORAL 2 TIMES DAILY
Qty: 10 CAPSULE | Refills: 0 | Status: SHIPPED | OUTPATIENT
Start: 2023-03-16 | End: 2023-03-21

## 2023-03-16 NOTE — PATIENT INSTRUCTIONS
Dear Марина Whitehead    After reviewing your responses, I've been able to diagnose you with a urinary tract infection, which is a common infection of the bladder with bacteria.  This is not a sexually transmitted infection, though urinating immediately after intercourse can help prevent infections.  Drinking lots of fluids is also helpful to clear your current infection and prevent the next one.      I have sent a prescription for antibiotics to your pharmacy to treat this infection.    It is important that you take all of your prescribed medication even if your symptoms are improving after a few doses.  Taking all of your medicine helps prevent the symptoms from returning.     If your symptoms worsen, you develop pain in your back or stomach, develop fevers, or are not improving in 5 days, please contact your primary care provider for an appointment or visit any of our convenient Walk-in or Urgent Care Centers to be seen, which can be found on our website here.    Thanks again for choosing us as your health care partner,    Sera Jordan PA-C, [facility]    Urinary Tract Infections in Women  Urinary tract infections (UTIs) are most often caused by bacteria. These bacteria enter the urinary tract. The bacteria may come from inside the body. Or they may travel from the skin outside the rectum or vagina into the urethra. Female anatomy makes it easy for bacteria from the bowel to enter a woman s urinary tract, which is the most common source of UTI. This means women develop UTIs more often than men. Pain in or around the urinary tract is a common UTI symptom. But the only way to know for sure if you have a UTI for the healthcare provider to test your urine. The two tests that may be done are the urinalysis and urine culture.     Types of UTIs    Cystitis. A bladder infection (cystitis) is the most common UTI in women. You may have urgent or frequent need to pee. You may also have pain, burning when you pee, and  bloody urine.    Urethritis. This is an inflamed urethra, which is the tube that carries urine from the bladder to outside the body. You may have lower stomach or back pain. You may also have urgent or frequent need to pee.    Pyelonephritis. This is a kidney infection. If not treated, it can be serious and damage your kidneys. In severe cases, you may need to stay in the hospital. You may have a fever and lower back pain.    Medicines to treat a UTI  Most UTIs are treated with antibiotics. These kill the bacteria. The length of time you need to take them depends on the type of infection. It may be as short as 3 days. If you have repeated UTIs, you may need a low-dose antibiotic for several months. Take antibiotics exactly as directed. Don t stop taking them until all of the medicine is gone. If you stop taking the antibiotic too soon, the infection may not go away. You may also develop a resistance to the antibiotic. This can make it much harder to treat.   Lifestyle changes to treat and prevent UTIs   The lifestyle changes below will help get rid of your UTI. They may also help prevent future UTIs.     Drink plenty of fluids. This includes water, juice, or other caffeine-free drinks. Fluids help flush bacteria out of your body.    Empty your bladder. Always empty your bladder when you feel the urge to pee. And always pee before going to sleep. Urine that stays in your bladder can lead to infection. Try to pee before and after sex as well.    Practice good personal hygiene. Wipe yourself from front to back after using the toilet. This helps keep bacteria from getting into the urethra.    Use condoms during sex. These help prevent UTIs caused by sexually transmitted bacteria. Also don't use spermicides during sex. These can increase the risk for UTIs. Choose other forms of birth control instead. For women who tend to get UTIs after sex, a low-dose of a preventive antibiotic may be used. Be sure to discuss this  option with your healthcare provider.    Follow up with your healthcare provider as directed. He or she may test to make sure the infection has cleared. If needed, more treatment may be started.  Claudine last reviewed this educational content on 7/1/2019 2000-2021 [employer]. All rights reserved. This information is not intended as a substitute for professional medical care. Always follow your healthcare professional's instructions.

## 2023-04-07 ENCOUNTER — OFFICE VISIT (OUTPATIENT)
Dept: FAMILY MEDICINE | Facility: CLINIC | Age: 43
End: 2023-04-07
Payer: COMMERCIAL

## 2023-04-07 VITALS
WEIGHT: 196 LBS | OXYGEN SATURATION: 93 % | SYSTOLIC BLOOD PRESSURE: 125 MMHG | TEMPERATURE: 97.4 F | HEART RATE: 81 BPM | BODY MASS INDEX: 29.03 KG/M2 | RESPIRATION RATE: 17 BRPM | DIASTOLIC BLOOD PRESSURE: 84 MMHG | HEIGHT: 69 IN

## 2023-04-07 DIAGNOSIS — Z00.00 ROUTINE GENERAL MEDICAL EXAMINATION AT A HEALTH CARE FACILITY: Primary | ICD-10-CM

## 2023-04-07 DIAGNOSIS — M46.1 SACROILIITIS (H): ICD-10-CM

## 2023-04-07 DIAGNOSIS — F33.41 RECURRENT MAJOR DEPRESSIVE DISORDER, IN PARTIAL REMISSION (H): ICD-10-CM

## 2023-04-07 DIAGNOSIS — Z13.220 LIPID SCREENING: ICD-10-CM

## 2023-04-07 DIAGNOSIS — Z13.29 SCREENING FOR THYROID DISORDER: ICD-10-CM

## 2023-04-07 DIAGNOSIS — Z12.31 ENCOUNTER FOR SCREENING MAMMOGRAM FOR BREAST CANCER: ICD-10-CM

## 2023-04-07 DIAGNOSIS — F41.1 GENERALIZED ANXIETY DISORDER: ICD-10-CM

## 2023-04-07 DIAGNOSIS — Z13.1 SCREENING FOR DIABETES MELLITUS: ICD-10-CM

## 2023-04-07 DIAGNOSIS — Z13.0 SCREENING FOR IRON DEFICIENCY ANEMIA: ICD-10-CM

## 2023-04-07 DIAGNOSIS — N89.8 VAGINAL DISCHARGE: ICD-10-CM

## 2023-04-07 LAB
ALBUMIN SERPL BCG-MCNC: 4.4 G/DL (ref 3.5–5.2)
ALP SERPL-CCNC: 81 U/L (ref 35–104)
ALT SERPL W P-5'-P-CCNC: 19 U/L (ref 10–35)
ANION GAP SERPL CALCULATED.3IONS-SCNC: 15 MMOL/L (ref 7–15)
AST SERPL W P-5'-P-CCNC: 22 U/L (ref 10–35)
BILIRUB SERPL-MCNC: 0.4 MG/DL
BUN SERPL-MCNC: 12 MG/DL (ref 6–20)
CALCIUM SERPL-MCNC: 9.7 MG/DL (ref 8.6–10)
CHLORIDE SERPL-SCNC: 105 MMOL/L (ref 98–107)
CHOLEST SERPL-MCNC: 251 MG/DL
CLUE CELLS: ABNORMAL
CREAT SERPL-MCNC: 1.11 MG/DL (ref 0.51–0.95)
DEPRECATED HCO3 PLAS-SCNC: 23 MMOL/L (ref 22–29)
ERYTHROCYTE [DISTWIDTH] IN BLOOD BY AUTOMATED COUNT: 12.8 % (ref 10–15)
GFR SERPL CREATININE-BSD FRML MDRD: 63 ML/MIN/1.73M2
GLUCOSE SERPL-MCNC: 80 MG/DL (ref 70–99)
HCT VFR BLD AUTO: 43.6 % (ref 35–47)
HDLC SERPL-MCNC: 60 MG/DL
HGB BLD-MCNC: 14.3 G/DL (ref 11.7–15.7)
LDLC SERPL CALC-MCNC: 166 MG/DL
MCH RBC QN AUTO: 32.1 PG (ref 26.5–33)
MCHC RBC AUTO-ENTMCNC: 32.8 G/DL (ref 31.5–36.5)
MCV RBC AUTO: 98 FL (ref 78–100)
NONHDLC SERPL-MCNC: 191 MG/DL
PLATELET # BLD AUTO: 343 10E3/UL (ref 150–450)
POTASSIUM SERPL-SCNC: 4.2 MMOL/L (ref 3.4–5.3)
PROT SERPL-MCNC: 6.9 G/DL (ref 6.4–8.3)
RBC # BLD AUTO: 4.46 10E6/UL (ref 3.8–5.2)
SODIUM SERPL-SCNC: 143 MMOL/L (ref 136–145)
TRICHOMONAS, WET PREP: ABNORMAL
TRIGL SERPL-MCNC: 124 MG/DL
TSH SERPL DL<=0.005 MIU/L-ACNC: 3.19 UIU/ML (ref 0.3–4.2)
WBC # BLD AUTO: 9.3 10E3/UL (ref 4–11)
WBC'S/HIGH POWER FIELD, WET PREP: ABNORMAL
YEAST, WET PREP: ABNORMAL

## 2023-04-07 PROCEDURE — 87210 SMEAR WET MOUNT SALINE/INK: CPT | Performed by: NURSE PRACTITIONER

## 2023-04-07 PROCEDURE — 80053 COMPREHEN METABOLIC PANEL: CPT | Performed by: NURSE PRACTITIONER

## 2023-04-07 PROCEDURE — 80061 LIPID PANEL: CPT | Performed by: NURSE PRACTITIONER

## 2023-04-07 PROCEDURE — 86706 HEP B SURFACE ANTIBODY: CPT | Performed by: NURSE PRACTITIONER

## 2023-04-07 PROCEDURE — 84443 ASSAY THYROID STIM HORMONE: CPT | Performed by: NURSE PRACTITIONER

## 2023-04-07 PROCEDURE — 99213 OFFICE O/P EST LOW 20 MIN: CPT | Mod: 25 | Performed by: NURSE PRACTITIONER

## 2023-04-07 PROCEDURE — 85027 COMPLETE CBC AUTOMATED: CPT | Performed by: NURSE PRACTITIONER

## 2023-04-07 PROCEDURE — 99396 PREV VISIT EST AGE 40-64: CPT | Performed by: NURSE PRACTITIONER

## 2023-04-07 PROCEDURE — 36415 COLL VENOUS BLD VENIPUNCTURE: CPT | Performed by: NURSE PRACTITIONER

## 2023-04-07 RX ORDER — SERTRALINE HYDROCHLORIDE 100 MG/1
100 TABLET, FILM COATED ORAL DAILY
Qty: 90 TABLET | Refills: 3 | Status: SHIPPED | OUTPATIENT
Start: 2023-04-07 | End: 2023-06-19

## 2023-04-07 RX ORDER — BUPROPION HYDROCHLORIDE 300 MG/1
300 TABLET ORAL EVERY MORNING
Qty: 90 TABLET | Refills: 3 | Status: SHIPPED | OUTPATIENT
Start: 2023-04-07 | End: 2024-04-26

## 2023-04-07 ASSESSMENT — ENCOUNTER SYMPTOMS
SHORTNESS OF BREATH: 0
JOINT SWELLING: 0
HEMATOCHEZIA: 0
ABDOMINAL PAIN: 0
WEAKNESS: 0
HEARTBURN: 0
NERVOUS/ANXIOUS: 0
DIZZINESS: 0
HEADACHES: 0
DYSURIA: 0
CONSTIPATION: 1
SORE THROAT: 0
CHILLS: 0
MYALGIAS: 0
COUGH: 0
PALPITATIONS: 0
EYE PAIN: 0
HEMATURIA: 0
ARTHRALGIAS: 0
NAUSEA: 0
PARESTHESIAS: 0
DIARRHEA: 0
FREQUENCY: 0
FEVER: 0

## 2023-04-07 ASSESSMENT — PATIENT HEALTH QUESTIONNAIRE - PHQ9
SUM OF ALL RESPONSES TO PHQ QUESTIONS 1-9: 2
10. IF YOU CHECKED OFF ANY PROBLEMS, HOW DIFFICULT HAVE THESE PROBLEMS MADE IT FOR YOU TO DO YOUR WORK, TAKE CARE OF THINGS AT HOME, OR GET ALONG WITH OTHER PEOPLE: NOT DIFFICULT AT ALL
SUM OF ALL RESPONSES TO PHQ QUESTIONS 1-9: 2

## 2023-04-07 NOTE — PROGRESS NOTES
SUBJECTIVE:   CC: Марина is an 43 year old who presents for preventive health visit.       4/7/2023     2:15 PM   Additional Questions   Roomed by Trini   Patient has been advised of split billing requirements and indicates understanding: Yes  Healthy Habits:     Getting at least 3 servings of Calcium per day:  Yes    Bi-annual eye exam:  Yes    Dental care twice a year:  Yes    Sleep apnea or symptoms of sleep apnea:  None    Diet:  Regular (no restrictions)    Frequency of exercise:  4-5 days/week    Duration of exercise:  30-45 minutes    Taking medications regularly:  Yes    Medication side effects:  None    PHQ-2 Total Score: 1    Additional concerns today:  No            Today's PHQ-2 Score:       4/7/2023     2:14 PM   PHQ-2 ( 1999 [employer])   Q1: Little interest or pleasure in doing things 0   Q2: Feeling down, depressed or hopeless 1   PHQ-2 Score 1   Q1: Little interest or pleasure in doing things Not at all   Q2: Feeling down, depressed or hopeless Several days   PHQ-2 Score 1           Social History     Tobacco Use     Smoking status: Never     Smokeless tobacco: Never   Vaping Use     Vaping status: Not on file   Substance Use Topics     Alcohol use: Yes     Alcohol/week: 2.0 standard drinks of alcohol             4/7/2023     2:14 PM   Alcohol Use   Prescreen: >3 drinks/day or >7 drinks/week? Yes     Reviewed orders with patient.  Reviewed health maintenance and updated orders accordingly - Yes  Lab work is in process    Breast Cancer Screening:        3/3/2022    10:53 AM   Breast CA Risk Assessment (FHS-7)   Do you have a family history of breast, colon, or ovarian cancer? No / Unknown         Mammogram Screening - Offered annual screening and updated Health Maintenance for mutual plan based on risk factor consideration    Pertinent mammograms are reviewed under the imaging tab.    History of abnormal Pap smear: NO - age 30- 65 PAP every 3 years recommended      Latest Ref Rng & Units 3/3/2022      "2:15 PM 3/12/2019     7:45 AM 3/12/2019     7:40 AM   PAP / HPV   PAP  Negative for Intraepithelial Lesion or Malignancy (NILM)       PAP (Historical)   NIL      HPV 16 DNA Negative Negative    Negative     HPV 18 DNA Negative Negative    Negative     Other HR HPV Negative Negative    Negative       Reviewed and updated as needed this visit by clinical staff   Tobacco  Allergies  Meds              Reviewed and updated as needed this visit by Provider                     Review of Systems   Constitutional: Negative for chills and fever.   HENT: Positive for congestion. Negative for ear pain, hearing loss and sore throat.    Eyes: Negative for pain and visual disturbance.   Respiratory: Negative for cough and shortness of breath.    Cardiovascular: Negative for chest pain, palpitations and peripheral edema.   Gastrointestinal: Positive for constipation. Negative for abdominal pain, diarrhea, heartburn, hematochezia and nausea.   Genitourinary: Negative for dysuria, frequency, genital sores, hematuria and urgency.   Musculoskeletal: Negative for arthralgias, joint swelling and myalgias.   Skin: Negative for rash.   Neurological: Negative for dizziness, weakness, headaches and paresthesias.   Psychiatric/Behavioral: Negative for mood changes. The patient is not nervous/anxious.           OBJECTIVE:   /84 (BP Location: Right arm, Patient Position: Sitting, Cuff Size: Adult Regular)   Pulse 81   Temp 97.4  F (36.3  C) (Temporal)   Resp 17   Ht 1.75 m (5' 8.9\")   Wt 88.9 kg (196 lb)   LMP 03/17/2023   SpO2 93%   BMI 29.03 kg/m    Physical Exam  GENERAL: healthy, alert and no distress  EYES: Eyes grossly normal to inspection, PERRL and conjunctivae and sclerae normal  HENT: ear canals and TM's normal, nose and mouth without ulcers or lesions  NECK: no adenopathy, no asymmetry, masses, or scars and thyroid normal to palpation  RESP: lungs clear to auscultation - no rales, rhonchi or wheezes  BREAST: normal " without masses, tenderness or nipple discharge and no palpable axillary masses or adenopathy  CV: regular rate and rhythm, normal S1 S2, no S3 or S4, no murmur, click or rub, no peripheral edema and peripheral pulses strong  ABDOMEN: soft, nontender, no hepatosplenomegaly, no masses and bowel sounds normal  MS: no gross musculoskeletal defects noted, no edema  SKIN: no suspicious lesions or rashes  NEURO: Normal strength and tone, mentation intact and speech normal  PSYCH: mentation appears normal, affect normal/bright        ASSESSMENT/PLAN:   (Z00.00) Routine general medical examination at a health care facility  (primary encounter diagnosis)  Comment: Reviewed medical/social/family history and health maintenance  Plan: Hepatitis B Surface Antibody            (M46.1) Sacroiliitis (H)  Comment: Stable chronic issue, no recent flares.  Will refer to PT/Ortho if as needed.  Plan:     (F33.41) Recurrent major depressive disorder, in partial remission (H)  Comment: Stable, no changes to medications today.  Refills provided.    Plan: sertraline (ZOLOFT) 100 MG tablet, buPROPion         (WELLBUTRIN XL) 300 MG 24 hr tablet            (Z13.220) Lipid screening  Comment:   Plan: Lipid panel reflex to direct LDL Non-fasting            (Z13.1) Screening for diabetes mellitus  Comment:   Plan: Comprehensive metabolic panel (BMP + Alb, Alk         Phos, ALT, AST, Total. Bili, TP)            (Z13.29) Screening for thyroid disorder  Comment:   Plan: TSH with free T4 reflex            (Z13.0) Screening for iron deficiency anemia  Comment:   Plan: CBC with platelets            (F41.1) Generalized anxiety disorder  Comment:  Stable, tolerating med, no changes at this time  Plan: buPROPion (WELLBUTRIN XL) 300 MG 24 hr tablet            (Z12.31) Encounter for screening mammogram for breast cancer  Comment:   Plan: MA Screen Bilateral w/Felipe            (N89.8) Vaginal discharge  Comment: Noticing some vaginal changes, wondering if it  "is mehdi-menopausal.  Mother had early onset menopause.  Will check wetprep to rule out BV and treat accordingly  Plan: Wet prep - Clinic Collect              Patient has been advised of split billing requirements and indicates understanding: Yes      COUNSELING:  Reviewed preventive health counseling, as reflected in patient instructions      BMI:   Estimated body mass index is 29.03 kg/m  as calculated from the following:    Height as of this encounter: 1.75 m (5' 8.9\").    Weight as of this encounter: 88.9 kg (196 lb).   Weight management plan: Discussed healthy diet and exercise guidelines      She reports that she has never smoked. She has never used smokeless tobacco.          DAVIN Ayala [facility]  "

## 2023-04-08 LAB
HBV SURFACE AB SERPL IA-ACNC: 0 M[IU]/ML
HBV SURFACE AB SERPL IA-ACNC: NONREACTIVE M[IU]/ML

## 2023-04-11 ENCOUNTER — MYC MEDICAL ADVICE (OUTPATIENT)
Dept: FAMILY MEDICINE | Facility: CLINIC | Age: 43
End: 2023-04-11
Payer: COMMERCIAL

## 2023-04-13 NOTE — TELEPHONE ENCOUNTER
Writer responded via [employer].    DAY GroverN, RN-BC  [facility]

## 2023-06-14 ENCOUNTER — MYC MEDICAL ADVICE (OUTPATIENT)
Dept: FAMILY MEDICINE | Facility: CLINIC | Age: 43
End: 2023-06-14
Payer: COMMERCIAL

## 2023-06-14 DIAGNOSIS — K64.5 THROMBOSED EXTERNAL HEMORRHOIDS: Primary | ICD-10-CM

## 2023-06-16 NOTE — TELEPHONE ENCOUNTER
Maya: unsure of the answer but GI referral pended    I have a thrombosed hemorrhoid that isn t resolving on its own. Can I get treatment at the clinic or would i need a specialty provider?  Thank you     Aria MCADAMS RN  [facility]

## 2023-06-19 ENCOUNTER — VIRTUAL VISIT (OUTPATIENT)
Dept: FAMILY MEDICINE | Facility: CLINIC | Age: 43
End: 2023-06-19
Payer: COMMERCIAL

## 2023-06-19 DIAGNOSIS — K64.5 THROMBOSED EXTERNAL HEMORRHOIDS: ICD-10-CM

## 2023-06-19 DIAGNOSIS — F10.20 UNCOMPLICATED ALCOHOL DEPENDENCE (H): Primary | ICD-10-CM

## 2023-06-19 DIAGNOSIS — F33.41 RECURRENT MAJOR DEPRESSIVE DISORDER, IN PARTIAL REMISSION (H): ICD-10-CM

## 2023-06-19 PROCEDURE — 99214 OFFICE O/P EST MOD 30 MIN: CPT | Mod: VID | Performed by: NURSE PRACTITIONER

## 2023-06-19 RX ORDER — NALTREXONE HYDROCHLORIDE 50 MG/1
50 TABLET, FILM COATED ORAL DAILY
Qty: 90 TABLET | Refills: 0 | Status: SHIPPED | OUTPATIENT
Start: 2023-06-19 | End: 2024-02-19 | Stop reason: SINTOL

## 2023-06-19 RX ORDER — SERTRALINE HYDROCHLORIDE 100 MG/1
150 TABLET, FILM COATED ORAL DAILY
Qty: 180 TABLET | Refills: 1 | Status: SHIPPED | OUTPATIENT
Start: 2023-06-19 | End: 2024-04-26

## 2023-06-19 NOTE — PROGRESS NOTES
Марина is a 43 year old who is being evaluated via a billable video visit.      How would you like to obtain your AVS? MyChart  If the video visit is dropped, the invitation should be resent by: Text to cell phone: [phone]  Will anyone else be joining your video visit? No          Assessment & Plan     Uncomplicated alcohol dependence (H)  She is open to starting naltrexone.  We discussed starting it after he trip in the event she has side effects.  She is already working with a therapist.  We also discussed healthy habits to replace drinking so she develops new associations with stress relief.  Plan to follow up in 4-6 weeks.   - naltrexone (DEPADE/REVIA) 50 MG tablet; Take 1 tablet (50 mg) by mouth daily    Recurrent major depressive disorder, in partial remission (H)  Agreeable to increasing her Zoloft today to help manage situational stressors and anxiety that she is using alcohol to cope.  She will continue working with her therapist.   - sertraline (ZOLOFT) 100 MG tablet; Take 1.5 tablets (150 mg) by mouth daily    Thrombosed external hemorrhoids  Recommended BID topicals this week and while she is on her biking trip and adding Anusol suppositories.        Prescription drug management        DAVIN Ayala [facility]   Марина is a 43 year old, presenting for the following health issues:  Drug / Alcohol Assessment        6/19/2023     8:04 AM   Additional Questions   Roomed by Janna CHRIS     Drug / Alcohol Assessment    History of Present Illness       Reason for visit:  Alcohol use    She eats 4 or more servings of fruits and vegetables daily.She consumes 0 sweetened beverage(s) daily.She exercises with enough effort to increase her heart rate 60 or more minutes per day.  She exercises with enough effort to increase her heart rate 5 days per week.   She is taking medications regularly.       NO consequences at work or symptoms of withdrawal, but drinking  "2-4 drinks a day.  From a health, weight, and mental health perspective, she understands this is not helping    Has tried to cut back on her own, but has a lot of life stress and finds she is using alcohol to cope.    History of external hemorrhoids, overall not symptomatic but will be going on a biking trip.  Wondering what she should do to avoid problems.  She uses external creams intermittently.                Review of Systems         Objective    Vitals - Patient Reported  Weight (Patient Reported): 86.2 kg (190 lb)  Height (Patient Reported): 175.3 cm (5' 9\")  BMI (Based on Pt Reported Ht/Wt): 28.06  Pain Score: No Pain (0)        Physical Exam   GENERAL: Healthy, alert and no distress  EYES: Eyes grossly normal to inspection.  No discharge or erythema, or obvious scleral/conjunctival abnormalities.  RESP: No audible wheeze, cough, or visible cyanosis.  No visible retractions or increased work of breathing.    SKIN: Visible skin clear. No significant rash, abnormal pigmentation or lesions.  NEURO: Cranial nerves grossly intact.  Mentation and speech appropriate for age.  PSYCH: Mentation appears normal, affect normal/bright, judgement and insight intact, normal speech and appearance well-groomed.                Video-Visit Details    Type of service:  Video Visit     Originating Location (pt. Location): Home    Distant Location (provider location):  Off-site  Platform used for Video Visit: Floridal[employer]"

## 2023-08-07 ENCOUNTER — VIRTUAL VISIT (OUTPATIENT)
Dept: FAMILY MEDICINE | Facility: CLINIC | Age: 43
End: 2023-08-07
Payer: COMMERCIAL

## 2023-08-07 DIAGNOSIS — R10.2 PELVIC PAIN IN FEMALE: ICD-10-CM

## 2023-08-07 DIAGNOSIS — R10.31 RLQ ABDOMINAL PAIN: Primary | ICD-10-CM

## 2023-08-07 PROCEDURE — 99213 OFFICE O/P EST LOW 20 MIN: CPT | Mod: VID | Performed by: NURSE PRACTITIONER

## 2023-08-07 NOTE — PROGRESS NOTES
Марина is a 43 year old who is being evaluated via a billable video visit.      How would you like to obtain your AVS? MyChart  If the video visit is dropped, the invitation should be resent by: Text to cell phone: [phone]  Will anyone else be joining your video visit? No          Assessment & Plan     RLQ abdominal pain  Pelvic pain in female  Diagnosed with pyelonephritis at the [facility], however, she hasn't significantly improved after 6 days of antibiotics.  Still has 4 days left and advised to finish.  Will obtain CT to r/o ovarian cyst, appendectomy, diverticulosis, etc.  ALso repeating UA to ensure resolution of UA and obtain cultures if needed.    - UA Macroscopic with reflex to Microscopic and Culture - Lab Collect; Future  - CT Abdomen Pelvis w Contrast; Future        DAVIN Ayala [facility]    Subjective   Марина is a 43 year old, presenting for the following health issues:  No chief complaint on file.      HPI     ED/UC Followup:    Facility:  [facility] Urgency Room  Date of visit: 08/01/2023  Reason for visit: Abdominal Pain  Current Status: Same    Was seen in the urgency room for abdominal pain with diarrhea.  As the day went on, started to lower right abdominal pain and in her back.  Generally were she feels period pain.      Was dehydrated, did IV and antiinflammatory.      Was diagnosed with UTI.  Thought early kidney infection.  On batrim for now 6 days.  Up until last night still having pain in low back.  Last night was a little better.  Now having some pain in abdomen, lower right.  Still having some urgency.      Still having some diarrhea, tons of stress for the last few months.  Did not do imaging at [facility].        Review of Systems   Constitutional, HEENT, cardiovascular, pulmonary, gi and gu systems are negative, except as otherwise noted.      Objective           Vitals:  No vitals were obtained today due to virtual visit.    Physical Exam   GENERAL:  Healthy, alert and no distress  EYES: Eyes grossly normal to inspection.  No discharge or erythema, or obvious scleral/conjunctival abnormalities.  RESP: No audible wheeze, cough, or visible cyanosis.  No visible retractions or increased work of breathing.    SKIN: Visible skin clear. No significant rash, abnormal pigmentation or lesions.  NEURO: Cranial nerves grossly intact.  Mentation and speech appropriate for age.  PSYCH: Mentation appears normal, affect normal/bright, judgement and insight intact, normal speech and appearance well-groomed.                Video-Visit Details    Type of service:  Video Visit     Originating Location (pt. Location): Home    Distant Location (provider location):  Off-site  Platform used for Video Visit: [employer]

## 2023-08-08 ENCOUNTER — HOSPITAL ENCOUNTER (OUTPATIENT)
Dept: CT IMAGING | Facility: HOSPITAL | Age: 43
Discharge: HOME | End: 2023-08-08
Attending: NURSE PRACTITIONER | Admitting: NURSE PRACTITIONER
Payer: COMMERCIAL

## 2023-08-08 ENCOUNTER — LAB (OUTPATIENT)
Dept: LAB | Facility: CLINIC | Age: 43
End: 2023-08-08
Payer: COMMERCIAL

## 2023-08-08 DIAGNOSIS — R10.2 PELVIC PAIN IN FEMALE: ICD-10-CM

## 2023-08-08 DIAGNOSIS — R10.31 RLQ ABDOMINAL PAIN: ICD-10-CM

## 2023-08-08 LAB
ALBUMIN UR-MCNC: 30 MG/DL
APPEARANCE UR: CLEAR
BACTERIA #/AREA URNS HPF: ABNORMAL /HPF
BILIRUB UR QL STRIP: NEGATIVE
COLOR UR AUTO: YELLOW
GLUCOSE UR STRIP-MCNC: NEGATIVE MG/DL
HGB UR QL STRIP: ABNORMAL
KETONES UR STRIP-MCNC: NEGATIVE MG/DL
LEUKOCYTE ESTERASE UR QL STRIP: NEGATIVE
NITRATE UR QL: NEGATIVE
PH UR STRIP: 7.5 [PH] (ref 5–8)
RBC #/AREA URNS AUTO: ABNORMAL /HPF
SP GR UR STRIP: 1.01 (ref 1–1.03)
SQUAMOUS #/AREA URNS AUTO: ABNORMAL /LPF
UROBILINOGEN UR STRIP-ACNC: 0.2 E.U./DL
WBC #/AREA URNS AUTO: ABNORMAL /HPF

## 2023-08-08 PROCEDURE — 250N000011 HC RX IP 250 OP 636: Performed by: NURSE PRACTITIONER

## 2023-08-08 PROCEDURE — 81001 URINALYSIS AUTO W/SCOPE: CPT

## 2023-08-08 PROCEDURE — 74177 CT ABD & PELVIS W/CONTRAST: CPT

## 2023-08-08 RX ORDER — IOPAMIDOL 755 MG/ML
90 INJECTION, SOLUTION INTRAVASCULAR ONCE
Status: COMPLETED | OUTPATIENT
Start: 2023-08-08 | End: 2023-08-08

## 2023-08-08 RX ADMIN — IOPAMIDOL 90 ML: 755 INJECTION, SOLUTION INTRAVENOUS at 07:47

## 2023-08-09 ENCOUNTER — MYC MEDICAL ADVICE (OUTPATIENT)
Dept: FAMILY MEDICINE | Facility: CLINIC | Age: 43
End: 2023-08-09
Payer: COMMERCIAL

## 2023-08-09 DIAGNOSIS — B37.31 YEAST INFECTION OF THE VAGINA: Primary | ICD-10-CM

## 2023-08-10 RX ORDER — FLUCONAZOLE 150 MG/1
150 TABLET ORAL ONCE
Qty: 1 TABLET | Refills: 0 | Status: SHIPPED | OUTPATIENT
Start: 2023-08-10 | End: 2023-08-10

## 2023-08-10 NOTE — TELEPHONE ENCOUNTER
Blood is non-specific, can be related to trauma, menses, etc.  A few bacteria are likely contaminates, however, her sample showed no bacteria and a few squamous cells.  Leukocyte esterace presence would be more indicative of UTI and larger bacteria count.

## 2023-08-10 NOTE — TELEPHONE ENCOUNTER
Maya: as she was just seen 8/7, would you prescribe Diflucan or would you want an evisit?    Good afternoon. I know I m still waiting on test results to be interpreted, but feigning on the outcome, I m wondering if I could get an rx for diflucan? I m about to finish this round of abx and feel like I am getting a yeast infection. Thx!     Aria MCADAMS RN  [facility]

## 2023-08-10 NOTE — TELEPHONE ENCOUNTER
On UA you sent message that it did not indicate an infection but patient would like to know why it showed blood and few bacteria.  She is 18 days from her last period.  Please advise.  Alexandrea Rivera RN  [facility]

## 2023-08-22 ENCOUNTER — MYC MEDICAL ADVICE (OUTPATIENT)
Dept: FAMILY MEDICINE | Facility: CLINIC | Age: 43
End: 2023-08-22
Payer: COMMERCIAL

## 2023-08-24 NOTE — TELEPHONE ENCOUNTER
Referral Coordinators-Please advise if retro-referral for Physical Therapy at [facility] can be provided to patient.  If not, please contact patient to explain.    Thank you!  DAY GroverN, RN-[facility]

## 2023-08-24 NOTE — TELEPHONE ENCOUNTER
.[facility] does not submit retro or back dated referrals for services that have already been provided outside of the care system.    If pt's wishes to appeal this decision, pt's health plan has a specific policy to follow.     Thank you,     Karlee

## 2023-09-19 ENCOUNTER — E-VISIT (OUTPATIENT)
Dept: URGENT CARE | Facility: CLINIC | Age: 43
End: 2023-09-19
Payer: COMMERCIAL

## 2023-09-19 DIAGNOSIS — B37.31 CANDIDAL VULVOVAGINITIS: Primary | ICD-10-CM

## 2023-09-19 PROCEDURE — 99421 OL DIG E/M SVC 5-10 MIN: CPT | Performed by: NURSE PRACTITIONER

## 2023-09-19 RX ORDER — FLUCONAZOLE 150 MG/1
150 TABLET ORAL ONCE
Qty: 1 TABLET | Refills: 0 | Status: SHIPPED | OUTPATIENT
Start: 2023-09-19 | End: 2023-09-19

## 2023-09-20 NOTE — PATIENT INSTRUCTIONS
Thank you for choosing us for your care. I have placed an order for a prescription so that you can start treatment. View your full visit summary for details by clicking on the link below. Your pharmacist will able to address any questions you may have about the medication.     If you re not feeling better within 2-3 days, please schedule an appointment.  You can schedule an appointment right here in [facility], or call [phone]  If the visit is for the same symptoms as your eVisit, we ll refund the cost of your eVisit if seen within seven days.    Vaginal Yeast Infection: Care Instructions  Overview     A vaginal yeast infection is the growth of too many yeast cells in the vagina. This is a common problem. Itching, vaginal discharge and irritation, and other symptoms can bother you. But yeast infections don't often cause other health problems.  Some medicines can increase your risk of getting a yeast infection. These include antibiotics, hormones, and steroids. You may also be more likely to get a yeast infection if you are pregnant, have diabetes, douche, or wear tight clothes.  With treatment, most yeast infections get better in a few days.  Follow-up care is a key part of your treatment and safety. Be sure to make and go to all appointments, and call your doctor if you are having problems. It's also a good idea to know your test results and keep a list of the medicines you take.  How can you care for yourself at home?  Take your medicines exactly as prescribed. Call your doctor if you think you are having a problem with your medicine.  Ask your doctor about over-the-counter (OTC) medicines for yeast infections. If you use an OTC treatment, read and follow all instructions on the label.  Don't use tampons while using a vaginal cream or suppository. The tampons can absorb the medicine. Use pads instead.  Wear loose cotton clothing. Don't wear nylon or other fabric that holds body heat and moisture close to the  "skin.  Try sleeping without underwear.  Don't scratch. Relieve itching with a cold pack or a cool bath.  Don't wash your vulva more than once a day. Use plain water or a mild, unscented soap. Air-dry the vulva.  Change out of wet or damp clothes as soon as possible.  If you are using a vaginal medicine, don't have sex until you have finished your treatment. But if you do have sex, don't depend on a condom or diaphragm for birth control. The oil in some vaginal medicines weakens latex.  Don't douche or use powders, sprays, or perfumes in your vagina or on your vulva. These items can change the normal balance of organisms in your vagina.  When should you call for help?   Call your doctor now or seek immediate medical care if:    You have new or increased pain in your vagina or pelvis.   Watch closely for changes in your health, and be sure to contact your doctor if:    You have unexpected vaginal bleeding.     You have a fever.     You are not getting better after 2 days.     Your symptoms come back after you finish your medicines.   Where can you learn more?  Go to https://www.[employer].net/patiented  Enter F639 in the search box to learn more about \"Vaginal Yeast Infection: Care Instructions.\"  Current as of: August 2, 2022               Content Version: 13.7    8266-2547 [employer].   Care instructions adapted under license by your healthcare professional. If you have questions about a medical condition or this instruction, always ask your healthcare professional. [employer] disclaims any warranty or liability for your use of this information.      "

## 2023-10-11 ENCOUNTER — ANCILLARY PROCEDURE (OUTPATIENT)
Dept: MAMMOGRAPHY | Facility: CLINIC | Age: 43
End: 2023-10-11
Attending: NURSE PRACTITIONER
Payer: COMMERCIAL

## 2023-10-11 DIAGNOSIS — Z12.31 ENCOUNTER FOR SCREENING MAMMOGRAM FOR BREAST CANCER: ICD-10-CM

## 2023-10-11 PROCEDURE — 77067 SCR MAMMO BI INCL CAD: CPT | Mod: TC | Performed by: RADIOLOGY

## 2023-11-22 ENCOUNTER — MYC MEDICAL ADVICE (OUTPATIENT)
Dept: FAMILY MEDICINE | Facility: CLINIC | Age: 43
End: 2023-11-22
Payer: COMMERCIAL

## 2023-11-24 ENCOUNTER — E-VISIT (OUTPATIENT)
Dept: FAMILY MEDICINE | Facility: CLINIC | Age: 43
End: 2023-11-24
Payer: COMMERCIAL

## 2023-11-24 DIAGNOSIS — Z11.3 SCREENING EXAMINATION FOR VENEREAL DISEASE: Primary | ICD-10-CM

## 2023-11-24 PROCEDURE — 99421 OL DIG E/M SVC 5-10 MIN: CPT | Performed by: FAMILY MEDICINE

## 2023-11-24 NOTE — PATIENT INSTRUCTIONS
Thank you for choosing us for your care. Given your symptoms, I would like you to do a lab-only visit to determine what is causing them.  I have placed the orders.  Please schedule an appointment with the lab right here in [facility], or call [phone].  I will let you know when the results are back and next steps to take.  Safer Sex: Care Instructions  Overview  Safer sex is a way to reduce your risk of getting a sexually transmitted infection (STI). It can also help prevent pregnancy.  Several products can help you practice safer sex and reduce your chance of STIs. One of the best is a condom. There are internal and external condoms. You can use a special rubber sheet (dental dam) for protection during oral sex. Disposable gloves can keep your hands from touching blood, semen, or other body fluids that can carry infections.  Remember that birth control methods such as diaphragms, IUDs, foams, and birth control pills do not stop you from getting STIs.  Follow-up care is a key part of your treatment and safety. Be sure to make and go to all appointments, and call your doctor if you are having problems. It's also a good idea to know your test results and keep a list of the medicines you take.  How can you care for yourself at home?  Think about getting vaccinated to help prevent hepatitis A, hepatitis B, and human papillomavirus (HPV). They can be spread through sex.  Use a condom every time you have sex. Use an external condom, which goes on the penis. Or use an internal condom, which goes into the vagina or anus.  Make sure you use the right size external condom. A condom that's too small can break easily. A condom that's too big can slip off during sex.  Use a new condom each time you have sex. Be careful not to poke a hole in the condom when you open the wrapper.  Don't use an internal condom and an external condom at the same time.  Never use petroleum jelly (such as Vaseline), grease, hand lotion, baby oil, or  "anything with oil in it. These products can make holes in the condom.  After intercourse, hold the edge of the condom as you remove it. This will help keep semen from spilling out of the condom.  Do not have sex with anyone who has symptoms of an STI, such as sores on the genitals or mouth.  Do not drink a lot of alcohol or use drugs before sex.  Limit your sex partners. Sex with one partner who has sex only with you can reduce your risk of getting an STI.  Don't share sex toys. But if you do share them, use a condom and clean the sex toys between each use.  Talk to your partner(s) before you have sex. Talk about what you feel comfortable with and whether you have any boundaries with sex. And find out if your partner(s) may be at risk for any STI. Keep in mind that a person may be able to spread an STI even if they do not have symptoms. You and your partner(s) may want to get tested for STIs.  Where can you learn more?  Go to https://www.[employer].net/patiented  Enter B608 in the search box to learn more about \"Safer Sex: Care Instructions.\"  Current as of: April 19, 2023               Content Version: 13.8    7337-0442 [employer].   Care instructions adapted under license by your healthcare professional. If you have questions about a medical condition or this instruction, always ask your healthcare professional. [employer] disclaims any warranty or liability for your use of this information.      "

## 2023-11-27 DIAGNOSIS — F33.41 RECURRENT MAJOR DEPRESSIVE DISORDER, IN PARTIAL REMISSION (H): ICD-10-CM

## 2023-11-27 RX ORDER — SERTRALINE HYDROCHLORIDE 100 MG/1
150 TABLET, FILM COATED ORAL DAILY
Qty: 180 TABLET | Refills: 1 | Status: CANCELLED | OUTPATIENT
Start: 2023-11-27

## 2023-12-04 ENCOUNTER — LAB (OUTPATIENT)
Dept: LAB | Facility: CLINIC | Age: 43
End: 2023-12-04
Payer: COMMERCIAL

## 2023-12-04 DIAGNOSIS — Z11.3 SCREENING EXAMINATION FOR VENEREAL DISEASE: ICD-10-CM

## 2023-12-04 LAB
HIV 1+2 AB+HIV1 P24 AG SERPL QL IA: NONREACTIVE
T PALLIDUM AB SER QL: NONREACTIVE

## 2023-12-04 PROCEDURE — 36415 COLL VENOUS BLD VENIPUNCTURE: CPT

## 2023-12-04 PROCEDURE — 87389 HIV-1 AG W/HIV-1&-2 AB AG IA: CPT

## 2023-12-04 PROCEDURE — 86780 TREPONEMA PALLIDUM: CPT

## 2023-12-04 PROCEDURE — 87491 CHLMYD TRACH DNA AMP PROBE: CPT

## 2023-12-04 PROCEDURE — 87591 N.GONORRHOEAE DNA AMP PROB: CPT

## 2023-12-05 LAB
C TRACH DNA SPEC QL PROBE+SIG AMP: NEGATIVE
N GONORRHOEA DNA SPEC QL NAA+PROBE: NEGATIVE

## 2024-02-05 ENCOUNTER — VIRTUAL VISIT (OUTPATIENT)
Dept: FAMILY MEDICINE | Facility: CLINIC | Age: 44
End: 2024-02-05
Payer: COMMERCIAL

## 2024-02-05 DIAGNOSIS — R63.5 WEIGHT GAIN: Primary | ICD-10-CM

## 2024-02-05 PROCEDURE — 99214 OFFICE O/P EST MOD 30 MIN: CPT | Mod: 95 | Performed by: NURSE PRACTITIONER

## 2024-02-05 ASSESSMENT — PATIENT HEALTH QUESTIONNAIRE - PHQ9
10. IF YOU CHECKED OFF ANY PROBLEMS, HOW DIFFICULT HAVE THESE PROBLEMS MADE IT FOR YOU TO DO YOUR WORK, TAKE CARE OF THINGS AT HOME, OR GET ALONG WITH OTHER PEOPLE: SOMEWHAT DIFFICULT
SUM OF ALL RESPONSES TO PHQ QUESTIONS 1-9: 5
SUM OF ALL RESPONSES TO PHQ QUESTIONS 1-9: 5

## 2024-02-05 NOTE — PROGRESS NOTES
"Марина is a 44 year old who is being evaluated via a billable video visit.      How would you like to obtain your AVS? MyChart  If the video visit is dropped, the invitation should be resent by: Text to cell phone: [phone]  Will anyone else be joining your video visit? No          Assessment & Plan     Weight gain  Agreeable to MTM referral.  I do suspect stress and mehdi-menopause are contributing.  We have previously tried naltrexone in combination with her Wellbutrin.  She did not like the side effects of naltrexone.  I do not suspect she will be a candidate for GLP1, but could consider topamax.  She has had some success with intermittent fasting in the past, but had difficulty maintaining when weight lifting.    - Med Therapy Management Referral      30 minutes spent by me on the date of the encounter doing chart review, history and exam, documentation and further activities per the note      BMI  Estimated body mass index is 29.03 kg/m  as calculated from the following:    Height as of 4/7/23: 1.75 m (5' 8.9\").    Weight as of 4/7/23: 88.9 kg (196 lb).   Weight management plan: Patient referred to endocrine and/or weight management specialty          Subjective   Марина is a 44 year old, presenting for the following health issues:  Weight management      2/5/2024     3:38 PM   Additional Questions   Roomed by Janna DIEZ     History of Present Illness       Reason for visit:  Weight management, perimenopause,        Weight and menopause.    Getting -  Has been stressful, hectic.  Weight has creeped up.  Exercise  Eats healthy, very active,  Feels like she is creeping up.    Wanting to get on top of it.  Wondering if is hormonal.  Things that used to work are not working anymore.  Menopause has generally been early in her family  Cycles are shortening, but gets every month.      Had done some intermittent fasting- but couldn't maintain fasting with activity.  Worked with a weight lifting .  " "    Managing stress better.  Trying to do the old activities is not helping.  Weight continues to creep up.            Review of Systems  Constitutional, HEENT, cardiovascular, pulmonary, gi and gu systems are negative, except as otherwise noted.      Objective    Vitals - Patient Reported  Weight (Patient Reported): 91.6 kg (202 lb)  Height (Patient Reported): 175.3 cm (5' 9\")  BMI (Based on Pt Reported Ht/Wt): 29.83  Pain Score: No Pain (0)        Physical Exam   GENERAL: alert and no distress  EYES: Eyes grossly normal to inspection.  No discharge or erythema, or obvious scleral/conjunctival abnormalities.  RESP: No audible wheeze, cough, or visible cyanosis.    SKIN: Visible skin clear. No significant rash, abnormal pigmentation or lesions.  NEURO: Cranial nerves grossly intact.  Mentation and speech appropriate for age.  PSYCH: Appropriate affect, tone, and pace of words          Video-Visit Details    Type of service:  Video Visit     Originating Location (pt. Location): Home    Distant Location (provider location):  Off-site  Platform used for Video Visit: Floridalma  Signed Electronically by: DAVIN Ayala CNP    "

## 2024-02-09 ENCOUNTER — TELEPHONE (OUTPATIENT)
Dept: FAMILY MEDICINE | Facility: CLINIC | Age: 44
End: 2024-02-09
Payer: COMMERCIAL

## 2024-02-09 NOTE — TELEPHONE ENCOUNTER
MTM referral from: [facility] visit (referral by provider)    MTM referral outreach attempt #2 on February 9, 2024 at 11:01 AM      Outcome: Patient not reachable after several attempts, will route to MT Pharmacist/Provider as an FYI.  [facility] scheduling number is .  Thank you for the referral.    Use private pay for the carrier/Plan on the flowsheet      UXPin Message Sent    Jie Martinez CPhT  [facility]

## 2024-02-18 NOTE — PROGRESS NOTES
Medication Therapy Management (MTM) Encounter    ASSESSMENT:                            Medication Adherence/Access: No issues identified    Overweight (BMI 27-29.9):   Patient would benefit from  starting Topiramate -see plan for details and lifestyle modifications.       PLAN:                            1. Recent home weight is 201.0lbs --your goal weight is 175-180lbs, per our discussion --naltrexone doesn't seem to be working for weight loss so we won't use that anymore --lets trial Topiramate -start with 25mg. Tab once daily in the PM for week-1, if well tolerated then week-2 is 25mg. In Am and PM, week-3 then is 25mg. In AM and 50mg. In PM , then week-4 is 50mg. In AM and Pm daily.     Also --work at reducing weekly alcohol by 50% as best you can , and get back to a lower carb diet (paleo like but not keto-must have enough carbs/day to feel mentally happy), also get back to daily exercise -walking for sure, maybe strength training 1-2 x week as well).     No snacking , 2 main meals/day , control carbs is a good plan, weight loss of 2lbs./month is a great goal to strive for each month .       Follow-up: Return in about 4 weeks (around 3/18/2024), or @ 9 AM via video visit., for Medication Therapy Management Visit, Weight loss.          SUBJECTIVE/OBJECTIVE:                          Марина Whitehead is a 44 year old female contacted via secure video for an initial visit. She was referred to me from Maya Wen      Reason for visit:   Reason for Referral:  Weight management:  I do suspect stress and mehdi-menopause are contributing. We have previously tried naltrexone in combination with her Wellbutrin. She did not like the side effects of naltrexone. I do not suspect she will be a candidate for GLP1, but could consider topamax. She has had some success with intermittent fasting in the past, but had difficulty maintaining when weight lifting.       Allergies/ADRs: Reviewed in chart  Past Medical History:  "Reviewed in chart  Tobacco: She reports that she has never smoked. She has never used smokeless tobacco.  Alcohol: 16-18- drinks /week beer,wine. Hard liquor  Other Substance Use: thc edibles -2 x week low dose   E-cigarette Use: none   Caffeine: 2-3 cups/day   Social: software leadership -sit down mostly  Personal Healthcare Goals: wt. Goal - 175-180 with her muscle mass.   Activity: not lately , 6 months into amicable divorce , before that power lifting 4 x week for last 2-3 years , some ice skating and cardio in gym.         Medication Adherence/Access: no issues reported          Overweight (BMI 27-29.9): home weight today 2-19-24 =201lbs.   Felt groggy on naltrexone --doesn't want to retry it.     Naltrexone didn't help so she stopped it .   Bupropion -currently takes helps for depression but not weight loss.   Patient reports no current medication side effects.  Nutrition/Eating Habits:   Excess calories from alcohol.  Have tried a paleo diet /intermittent fasting -neither one was sustainable ..off both now.    Bfast: not working out she skip, if kids there -oatmeal low sugar with protein powder/berries is her go too. Admits ate a donut yesterday with protein milk.  No snacks  Lunch : chicken breast + pasta + raspberries today   Afternoon snacks --apple , beef stick   Dinner; eats out socially 2-3 x week- last night pizza, chicken wings, or 1/2 cheeseburger, fries,  at home- protein , veggies, grain     Late night snacks ;  none.         Exercise/Activity: not much now.    Medication History:  Naltrexone: failed to lose wt. Caused depression/grogginess.  Wt Readings from Last 4 Encounters:   04/07/23 196 lb (88.9 kg)   12/30/22 190 lb (86.2 kg)   09/02/22 192 lb (87.1 kg)   08/19/21 185 lb (83.9 kg)     Estimated body mass index is 29.03 kg/m  as calculated from the following:    Height as of 4/7/23: 5' 8.9\" (1.75 m).    Weight as of 4/7/23: 196 lb (88.9 " kg).      --------------------------------------------------------------------------------------------------------------------    I spent 30 minutes with this patient today. All changes were made via collaborative practice agreement with DAVIN Ayala CNP. A copy of the visit note was provided to the patient's provider(s).    A summary of these recommendations was sent via [employer].    Leigha Moore Rph.  Medication Therapy Management Provider  [phone]      Telemedicine Visit Details  Type of service:  Video Conference via [address]  Start Time: 1:00 PM  End Time: 1:30 PM     Medication Therapy Recommendations  Obesity    Current Medication: topiramate (TOPAMAX) 25 MG tablet   Rationale: Untreated condition - Needs additional medication therapy - Indication   Recommendation: Start Medication   Status: Accepted per CPA

## 2024-02-19 ENCOUNTER — VIRTUAL VISIT (OUTPATIENT)
Dept: PHARMACY | Facility: CLINIC | Age: 44
End: 2024-02-19
Payer: COMMERCIAL

## 2024-02-19 DIAGNOSIS — E66.9 OBESITY: Primary | ICD-10-CM

## 2024-02-19 DIAGNOSIS — E66.09 CLASS 1 OBESITY DUE TO EXCESS CALORIES WITHOUT SERIOUS COMORBIDITY WITH BODY MASS INDEX (BMI) OF 30.0 TO 30.9 IN ADULT: ICD-10-CM

## 2024-02-19 DIAGNOSIS — E66.811 CLASS 1 OBESITY DUE TO EXCESS CALORIES WITHOUT SERIOUS COMORBIDITY WITH BODY MASS INDEX (BMI) OF 30.0 TO 30.9 IN ADULT: ICD-10-CM

## 2024-02-19 PROCEDURE — 99605 MTMS BY PHARM NP 15 MIN: CPT | Mod: 95 | Performed by: PHARMACIST

## 2024-02-19 PROCEDURE — 99607 MTMS BY PHARM ADDL 15 MIN: CPT | Performed by: PHARMACIST

## 2024-02-19 RX ORDER — TOPIRAMATE 25 MG/1
TABLET, FILM COATED ORAL
Qty: 70 TABLET | Refills: 1 | Status: SHIPPED | OUTPATIENT
Start: 2024-02-19 | End: 2024-03-18 | Stop reason: DRUGHIGH

## 2024-02-19 NOTE — Clinical Note
Maya--thx for mtm referral --yes we decided on starting topiramate with lifestyle changes --recheck in 4 weeks.  Chauncey

## 2024-02-19 NOTE — PATIENT INSTRUCTIONS
"Recommendations from today's MTM visit:                                                    MTM (medication therapy management) is a service provided by a clinical pharmacist designed to help you get the most of out of your medicines.   Today we reviewed what your medicines are for, how to know if they are working, that your medicines are safe and how to make your medicine regimen as easy as possible.      1. Recent home weight is 201.0lbs --your goal weight is 175-180lbs, per our discussion --naltrexone doesn't seem to be working for weight loss so we won't use that anymore --lets trial Topiramate -start with 25mg. Tab once daily in the PM for week-1, if well tolerated then week-2 is 25mg. In Am and PM, week-3 then is 25mg. In AM and 50mg. In PM , then week-4 is 50mg. In AM and Pm daily.     Also --work at reducing weekly alcohol by 50% as best you can , and get back to a lower carb diet (paleo like but not keto-must have enough carbs/day to feel mentally happy), also get back to daily exercise -walking for sure, maybe strength training 1-2 x week as well).     No snacking , 2 main meals/day , control carbs is a good plan, weight loss of 2lbs./month is a great goal to strive for each month .       Follow-up: Return in about 4 weeks (around 3/18/2024), or @ 9 AM via video visit., for Medication Therapy Management Visit, Weight loss.    It was great speaking with you today.  I value your experience and would be very thankful for your time in providing feedback in our clinic survey. In the next few days, you may receive an email or text message from [employer] with a link to a survey related to your  clinical pharmacist.\"     To schedule another MTM appointment, please call the clinic directly or you may call the MTM scheduling line at [phone] or toll-free at [phone].     My Clinical Pharmacist's contact information:                                                      Please feel free to contact me with " any questions or concerns you have.      Leigha Moore Rph.  Medication Therapy Management Provider  [phone]

## 2024-03-16 NOTE — PROGRESS NOTES
Medication Therapy Management (MTM) Encounter    ASSESSMENT:                            Medication Adherence/Access: No issues identified    Overweight (BMI 27-29.9):   Doing well on 50mg.2 x day Topiramate --will give her option over next 8 weeks to slowly increase dose to 100mg. 2 x day as needed. See plan for details.       PLAN:                            1. Great to hear your down 4 + lbs.--to 197lbs today --and that your tolerating the 50mg 2 x day Topiramate fairly well--I will send in rx for 50mg tablets of Topiramate now --feel free over next 8 weeks to slowly increase dose but only if needed up to 150mg./day and then if needed 100mg. 2 x day or 200mg./day as max. Dose.     If no appetite at current dose no need to increase dose just yet --just keep excellent diet changes and get back to daily exercise as well.         Follow-up: Return in about 2 months (around 5/23/2024), or @ 9 AM via video, for Medication Therapy Management Visit, Weight loss.        SUBJECTIVE/OBJECTIVE:                          Марина Whitehead is a 44 year old female contacted via secure video for a follow-up (2-19-24) visit. She was referred to me from Maya Wen      Private pay MTM for 2024.     Reason for visit:   Topiramate f/up wt.loss.       Allergies/ADRs: Reviewed in chart  Past Medical History: Reviewed in chart  Tobacco: She reports that she has never smoked. She has never used smokeless tobacco.  Alcohol: 16-18- drinks /week beer,wine. Hard liquor  Other Substance Use: thc edibles -2 x week low dose   E-cigarette Use: none   Caffeine: 2-3 cups/day   Social: software leadership -sit down mostly  Personal Healthcare Goals: wt. Goal - 175-180 with her muscle mass.   Activity: not lately , 6 months into amicable divorce , before that power lifting 4 x week for last 2-3 years , some ice skating and cardio in gym.         Medication Adherence/Access: no issues reported          Overweight (BMI 27-29.9):   Home weight today  "= 197lbs. 3 weeks in (down 4 lbs) and now starting 50mg 2 x day - dry mouth , a little tingling , losing a word or 2 ---but those are mild side effects ..  Off naltrexone now.   She admits today very little appetite. Not working out as much but wants to up her activity...    Feels better fasting now and less sugar --feels drug is helping with better blood sugars...less food noise...      Previously :   home weight today 2-19-24 =201lbs.   Felt groggy on naltrexone --doesn't want to retry it.     Naltrexone didn't help so she stopped it .   Bupropion -currently takes helps for depression but not weight loss.   Patient reports no current medication side effects.  Nutrition/Eating Habits:   Excess calories from alcohol.  Have tried a paleo diet /intermittent fasting -neither one was sustainable ..off both now.    Bfast: not working out she skip, if kids there -oatmeal low sugar with protein powder/berries is her go too. Admits ate a donut yesterday with protein milk.  No snacks  Lunch : chicken breast + pasta + raspberries today   Afternoon snacks --apple , beef stick   Dinner; eats out socially 2-3 x week- last night pizza, chicken wings, or 1/2 cheeseburger, fries,  at home- protein , veggies, grain     Late night snacks ;  none.         Exercise/Activity: not much now.    Medication History:  Naltrexone: failed to lose wt. Caused depression/grogginess.  Wt Readings from Last 4 Encounters:   04/07/23 196 lb (88.9 kg)   12/30/22 190 lb (86.2 kg)   09/02/22 192 lb (87.1 kg)   08/19/21 185 lb (83.9 kg)     Estimated body mass index is 29.03 kg/m  as calculated from the following:    Height as of 4/7/23: 5' 8.9\" (1.75 m).    Weight as of 4/7/23: 196 lb (88.9 kg).      --------------------------------------------------------------------------------------------------------------    I spent 15 minutes with this patient today. All changes were made via collaborative practice agreement with DAVIN Ayala CNP. A copy of " the visit note was provided to the patient's provider(s).    A summary of these recommendations was sent via [employer].    Leigha Moore Rph.  Medication Therapy Management Provider  [phone]      Telemedicine Visit Details  Type of service:  Video Conference via [employer]  Start Time: 9:00 AM   End Time: 9:15 AM      Medication Therapy Recommendations  Obesity    Current Medication: topiramate (TOPAMAX) 25 MG tablet (Discontinued)   Rationale: Dose too low - Dosage too low - Effectiveness   Recommendation: Increase Dose - topiramate 50 MG tablet   Status: Accepted per CPA

## 2024-03-18 ENCOUNTER — VIRTUAL VISIT (OUTPATIENT)
Dept: PHARMACY | Facility: CLINIC | Age: 44
End: 2024-03-18
Payer: COMMERCIAL

## 2024-03-18 DIAGNOSIS — E66.09 CLASS 1 OBESITY DUE TO EXCESS CALORIES WITHOUT SERIOUS COMORBIDITY WITH BODY MASS INDEX (BMI) OF 30.0 TO 30.9 IN ADULT: Primary | ICD-10-CM

## 2024-03-18 DIAGNOSIS — E66.811 CLASS 1 OBESITY DUE TO EXCESS CALORIES WITHOUT SERIOUS COMORBIDITY WITH BODY MASS INDEX (BMI) OF 30.0 TO 30.9 IN ADULT: Primary | ICD-10-CM

## 2024-03-18 PROCEDURE — 99606 MTMS BY PHARM EST 15 MIN: CPT | Performed by: PHARMACIST

## 2024-03-18 RX ORDER — TOPIRAMATE 50 MG/1
100 TABLET, FILM COATED ORAL 2 TIMES DAILY
Qty: 120 TABLET | Refills: 5 | Status: SHIPPED | OUTPATIENT
Start: 2024-03-18 | End: 2024-05-05 | Stop reason: DRUGHIGH

## 2024-03-18 NOTE — Clinical Note
Maya--guillermo off to good start on topiramate for wt.loss --I did increase dose and will allow her over next 8 weeks to self titrate...  Chauncey

## 2024-03-18 NOTE — PATIENT INSTRUCTIONS
"Recommendations from today's MTM visit:                                                         1. Great to hear your down 4 + lbs.--to 197lbs today --and that your tolerating the 50mg 2 x day Topiramate fairly well--I will send in rx for 50mg tablets of Topiramate now --feel free over next 8 weeks to slowly increase dose but only if needed up to 150mg./day and then if needed 100mg. 2 x day or 200mg./day as max. Dose.     If no appetite at current dose no need to increase dose just yet --just keep excellent diet changes and get back to daily exercise as well.         Follow-up: Return in about 2 months (around 5/23/2024), or @ 9 AM via video, for Medication Therapy Management Visit, Weight loss.    It was great speaking with you today.  I value your experience and would be very thankful for your time in providing feedback in our clinic survey. In the next few days, you may receive an email or text message from [employer] with a link to a survey related to your  clinical pharmacist.\"     To schedule another MTM appointment, please call the clinic directly or you may call the MTM scheduling line at [phone] or toll-free at [phone].     My Clinical Pharmacist's contact information:                                                      Please feel free to contact me with any questions or concerns you have.      Leigha Moore Rph.  Medication Therapy Management Provider  [phone]    "

## 2024-04-02 ENCOUNTER — E-VISIT (OUTPATIENT)
Dept: FAMILY MEDICINE | Facility: CLINIC | Age: 44
End: 2024-04-02
Payer: COMMERCIAL

## 2024-04-02 DIAGNOSIS — R30.0 DYSURIA: Primary | ICD-10-CM

## 2024-04-02 DIAGNOSIS — R31.21 ASYMPTOMATIC MICROSCOPIC HEMATURIA: ICD-10-CM

## 2024-04-02 DIAGNOSIS — B37.31 YEAST INFECTION OF THE VAGINA: ICD-10-CM

## 2024-04-02 PROCEDURE — 99421 OL DIG E/M SVC 5-10 MIN: CPT | Performed by: NURSE PRACTITIONER

## 2024-04-02 NOTE — PATIENT INSTRUCTIONS
Dear Марина Whitehead,     After reviewing your responses, I would like you to come in for a urine test to make sure we treat you correctly. This urine test is to evaluate you for a possible urinary tract infection, and should be scheduled for today or tomorrow. Schedule a Lab Only appointment here.     Lab appointments are not available at most locations on the weekends. If no Lab Only appointment is available, you should be seen in any of our convenient Walk-in or Urgent Care Centers, which can be found on our website here.     You will receive instructions with your results in [employer] once they are available.     If your symptoms worsen, you develop pain in your back or stomach, develop fevers, or are not improving in 5 days, please contact your primary care provider for an appointment or visit a Walk-in or Urgent Care Center to be seen.     Thanks again for choosing us as your health care partner,     DAVIN Ayala CNP

## 2024-04-03 ENCOUNTER — LAB (OUTPATIENT)
Dept: LAB | Facility: CLINIC | Age: 44
End: 2024-04-03
Payer: COMMERCIAL

## 2024-04-03 DIAGNOSIS — R30.0 DYSURIA: ICD-10-CM

## 2024-04-03 LAB
ALBUMIN UR-MCNC: NEGATIVE MG/DL
AMORPH CRY #/AREA URNS HPF: ABNORMAL /HPF
APPEARANCE UR: CLEAR
BACTERIA #/AREA URNS HPF: ABNORMAL /HPF
BILIRUB UR QL STRIP: NEGATIVE
CLUE CELLS: ABNORMAL
COLOR UR AUTO: YELLOW
GLUCOSE UR STRIP-MCNC: NEGATIVE MG/DL
HGB UR QL STRIP: ABNORMAL
KETONES UR STRIP-MCNC: NEGATIVE MG/DL
LEUKOCYTE ESTERASE UR QL STRIP: NEGATIVE
NITRATE UR QL: NEGATIVE
PH UR STRIP: 7.5 [PH] (ref 5–7)
RBC #/AREA URNS AUTO: ABNORMAL /HPF
SP GR UR STRIP: 1.02 (ref 1–1.03)
SQUAMOUS #/AREA URNS AUTO: ABNORMAL /LPF
TRICHOMONAS, WET PREP: ABNORMAL
UROBILINOGEN UR STRIP-ACNC: 1 E.U./DL
WBC #/AREA URNS AUTO: ABNORMAL /HPF
WBC CLUMPS #/AREA URNS HPF: PRESENT /HPF
WBC'S/HIGH POWER FIELD, WET PREP: ABNORMAL
YEAST, WET PREP: PRESENT

## 2024-04-03 PROCEDURE — 81001 URINALYSIS AUTO W/SCOPE: CPT

## 2024-04-03 PROCEDURE — 87210 SMEAR WET MOUNT SALINE/INK: CPT

## 2024-04-04 RX ORDER — FLUCONAZOLE 150 MG/1
150 TABLET ORAL ONCE
Qty: 1 TABLET | Refills: 0 | Status: SHIPPED | OUTPATIENT
Start: 2024-04-04 | End: 2024-04-08

## 2024-04-08 ENCOUNTER — MYC REFILL (OUTPATIENT)
Dept: FAMILY MEDICINE | Facility: CLINIC | Age: 44
End: 2024-04-08
Payer: COMMERCIAL

## 2024-04-08 DIAGNOSIS — B37.31 YEAST INFECTION OF THE VAGINA: ICD-10-CM

## 2024-04-09 RX ORDER — FLUCONAZOLE 150 MG/1
150 TABLET ORAL ONCE
Qty: 2 TABLET | Refills: 0 | Status: SHIPPED | OUTPATIENT
Start: 2024-04-09 | End: 2024-04-09

## 2024-04-26 ENCOUNTER — MYC REFILL (OUTPATIENT)
Dept: FAMILY MEDICINE | Facility: CLINIC | Age: 44
End: 2024-04-26
Payer: COMMERCIAL

## 2024-04-26 DIAGNOSIS — F33.41 RECURRENT MAJOR DEPRESSIVE DISORDER, IN PARTIAL REMISSION (H): ICD-10-CM

## 2024-04-26 DIAGNOSIS — F41.1 GENERALIZED ANXIETY DISORDER: ICD-10-CM

## 2024-04-26 RX ORDER — BUPROPION HYDROCHLORIDE 300 MG/1
300 TABLET ORAL EVERY MORNING
Qty: 90 TABLET | Refills: 1 | Status: SHIPPED | OUTPATIENT
Start: 2024-04-26

## 2024-04-26 RX ORDER — SERTRALINE HYDROCHLORIDE 100 MG/1
150 TABLET, FILM COATED ORAL DAILY
Qty: 180 TABLET | Refills: 1 | OUTPATIENT
Start: 2024-04-26

## 2024-04-26 RX ORDER — SERTRALINE HYDROCHLORIDE 100 MG/1
150 TABLET, FILM COATED ORAL DAILY
Qty: 180 TABLET | Refills: 1 | Status: SHIPPED | OUTPATIENT
Start: 2024-04-26

## 2024-05-04 DIAGNOSIS — E66.811 CLASS 1 OBESITY DUE TO EXCESS CALORIES WITHOUT SERIOUS COMORBIDITY WITH BODY MASS INDEX (BMI) OF 30.0 TO 30.9 IN ADULT: ICD-10-CM

## 2024-05-04 DIAGNOSIS — E66.09 CLASS 1 OBESITY DUE TO EXCESS CALORIES WITHOUT SERIOUS COMORBIDITY WITH BODY MASS INDEX (BMI) OF 30.0 TO 30.9 IN ADULT: ICD-10-CM

## 2024-05-06 RX ORDER — TOPIRAMATE 50 MG/1
100 TABLET, FILM COATED ORAL 2 TIMES DAILY
Qty: 360 TABLET | Refills: 1 | OUTPATIENT
Start: 2024-05-06

## 2024-05-28 DIAGNOSIS — E66.09 CLASS 1 OBESITY DUE TO EXCESS CALORIES WITHOUT SERIOUS COMORBIDITY WITH BODY MASS INDEX (BMI) OF 30.0 TO 30.9 IN ADULT: ICD-10-CM

## 2024-05-28 DIAGNOSIS — E66.811 CLASS 1 OBESITY DUE TO EXCESS CALORIES WITHOUT SERIOUS COMORBIDITY WITH BODY MASS INDEX (BMI) OF 30.0 TO 30.9 IN ADULT: ICD-10-CM

## 2024-05-28 RX ORDER — TOPIRAMATE 25 MG/1
25 TABLET, FILM COATED ORAL 2 TIMES DAILY
Qty: 180 TABLET | Refills: 1 | OUTPATIENT
Start: 2024-05-28

## 2024-07-06 ENCOUNTER — HEALTH MAINTENANCE LETTER (OUTPATIENT)
Age: 44
End: 2024-07-06

## 2024-08-11 ENCOUNTER — MYC MEDICAL ADVICE (OUTPATIENT)
Dept: FAMILY MEDICINE | Facility: CLINIC | Age: 44
End: 2024-08-11
Payer: COMMERCIAL

## 2024-08-11 DIAGNOSIS — S82.831S: ICD-10-CM

## 2024-08-11 DIAGNOSIS — S82.409A FIBULA FRACTURE: Primary | ICD-10-CM

## 2024-08-13 NOTE — TELEPHONE ENCOUNTER
Maya - patient requesting [facility] referral for UC follow up of distal fibular fx, pended below for review and signing if in agreement.    SYMONE Richey, DAYN, RN (she/her)  [facility] RN

## 2024-08-15 ENCOUNTER — OFFICE VISIT (OUTPATIENT)
Dept: FAMILY MEDICINE | Facility: CLINIC | Age: 44
End: 2024-08-15
Payer: COMMERCIAL

## 2024-08-15 VITALS
SYSTOLIC BLOOD PRESSURE: 115 MMHG | WEIGHT: 193 LBS | BODY MASS INDEX: 28.58 KG/M2 | HEART RATE: 86 BPM | OXYGEN SATURATION: 98 % | HEIGHT: 69 IN | DIASTOLIC BLOOD PRESSURE: 83 MMHG | TEMPERATURE: 97.3 F | RESPIRATION RATE: 16 BRPM

## 2024-08-15 DIAGNOSIS — F41.1 GENERALIZED ANXIETY DISORDER: ICD-10-CM

## 2024-08-15 DIAGNOSIS — S82.832D CLOSED AVULSION FRACTURE OF DISTAL END OF LEFT FIBULA WITH ROUTINE HEALING, SUBSEQUENT ENCOUNTER: ICD-10-CM

## 2024-08-15 DIAGNOSIS — F32.0 MAJOR DEPRESSIVE DISORDER, SINGLE EPISODE, MILD (H): ICD-10-CM

## 2024-08-15 DIAGNOSIS — Z01.818 PREOP GENERAL PHYSICAL EXAM: Primary | ICD-10-CM

## 2024-08-15 LAB
ANION GAP SERPL CALCULATED.3IONS-SCNC: 11 MMOL/L (ref 7–15)
BUN SERPL-MCNC: 12 MG/DL (ref 6–20)
CALCIUM SERPL-MCNC: 9.2 MG/DL (ref 8.8–10.4)
CHLORIDE SERPL-SCNC: 101 MMOL/L (ref 98–107)
CREAT SERPL-MCNC: 0.87 MG/DL (ref 0.51–0.95)
EGFRCR SERPLBLD CKD-EPI 2021: 84 ML/MIN/1.73M2
ERYTHROCYTE [DISTWIDTH] IN BLOOD BY AUTOMATED COUNT: 12.8 % (ref 10–15)
GLUCOSE SERPL-MCNC: 87 MG/DL (ref 70–99)
HCO3 SERPL-SCNC: 22 MMOL/L (ref 22–29)
HCT VFR BLD AUTO: 42.5 % (ref 35–47)
HGB BLD-MCNC: 13.6 G/DL (ref 11.7–15.7)
MCH RBC QN AUTO: 32.3 PG (ref 26.5–33)
MCHC RBC AUTO-ENTMCNC: 32 G/DL (ref 31.5–36.5)
MCV RBC AUTO: 101 FL (ref 78–100)
PLATELET # BLD AUTO: 391 10E3/UL (ref 150–450)
POTASSIUM SERPL-SCNC: 4.3 MMOL/L (ref 3.4–5.3)
RBC # BLD AUTO: 4.21 10E6/UL (ref 3.8–5.2)
SODIUM SERPL-SCNC: 134 MMOL/L (ref 135–145)
WBC # BLD AUTO: 8 10E3/UL (ref 4–11)

## 2024-08-15 PROCEDURE — 80048 BASIC METABOLIC PNL TOTAL CA: CPT | Performed by: NURSE PRACTITIONER

## 2024-08-15 PROCEDURE — 36415 COLL VENOUS BLD VENIPUNCTURE: CPT | Performed by: NURSE PRACTITIONER

## 2024-08-15 PROCEDURE — 99214 OFFICE O/P EST MOD 30 MIN: CPT | Mod: 25 | Performed by: NURSE PRACTITIONER

## 2024-08-15 PROCEDURE — 96127 BRIEF EMOTIONAL/BEHAV ASSMT: CPT | Performed by: NURSE PRACTITIONER

## 2024-08-15 PROCEDURE — 90715 TDAP VACCINE 7 YRS/> IM: CPT | Performed by: NURSE PRACTITIONER

## 2024-08-15 PROCEDURE — 90471 IMMUNIZATION ADMIN: CPT | Performed by: NURSE PRACTITIONER

## 2024-08-15 PROCEDURE — 85027 COMPLETE CBC AUTOMATED: CPT | Performed by: NURSE PRACTITIONER

## 2024-08-15 ASSESSMENT — PATIENT HEALTH QUESTIONNAIRE - PHQ9
SUM OF ALL RESPONSES TO PHQ QUESTIONS 1-9: 2
SUM OF ALL RESPONSES TO PHQ QUESTIONS 1-9: 2
10. IF YOU CHECKED OFF ANY PROBLEMS, HOW DIFFICULT HAVE THESE PROBLEMS MADE IT FOR YOU TO DO YOUR WORK, TAKE CARE OF THINGS AT HOME, OR GET ALONG WITH OTHER PEOPLE: NOT DIFFICULT AT ALL

## 2024-08-15 ASSESSMENT — PAIN SCALES - GENERAL: PAINLEVEL: SEVERE PAIN (6)

## 2024-08-15 NOTE — PROGRESS NOTES
Preoperative Evaluation  [address]  [address]  [place] [address]  Phone: [phone]  Fax: [phone]  Primary Provider: Maya Wen DNP  Pre-op Performing Provider: Maya Wen DNP  Aug 15, 2024             8/15/2024   Surgical Information   What procedure is being done? fibula ankle surgery   Facility or Hospital where procedure/surgery will be performed: [facility]   Who is doing the procedure / surgery? dr montemayor   Date of surgery / procedure: 36789   Time of surgery / procedure: morning   Where do you plan to recover after surgery? at home with family        Fax number for surgical facility: [phone]    Assessment & Plan     The proposed surgical procedure is considered INTERMEDIATE risk.    Preop general physical exam  Reviewed medical/social/family history and health maintenance  Continue to hold GLP1, can resume 1 week after surgery   - CBC with platelets; Future  - Basic metabolic panel  (Ca, Cl, CO2, Creat, Gluc, K, Na, BUN); Future  - CBC with platelets  - Basic metabolic panel  (Ca, Cl, CO2, Creat, Gluc, K, Na, BUN)    Closed avulsion fracture of distal end of left fibula with routine healing, subsequent encounter  - Rolling Knee Walker/Scooter Order    Generalized anxiety disorder   Stable, tolerating med, no changes at this time    Major depressive disorder, single episode, mild (H24)   Stable, tolerating med, no changes at this time            - No identified additional risk factors other than previously addressed    Antiplatelet or Anticoagulation Medication Instructions   - Patient is on no antiplatelet or anticoagulation medications.    Additional Medication Instructions  Take all scheduled medications on the day of surgery   - GLP-1 Injectable (exenitide, liraglutide, semaglutide, dulaglutide, etc.): DO NOT TAKE 7 days before surgery     Recommendation  Approval given to proceed with proposed procedure, without  further diagnostic evaluation.    Dotty Parish is a 44 year old, presenting for the following:  Pre-Op Exam          8/15/2024    12:48 PM   Additional Questions   Roomed by Janna DIEZ         8/15/2024    12:48 PM   Patient Reported Additional Medications   Patient reports taking the following new medications Zepbound 5mg, stopped for surgery tho     HPI related to upcoming procedure: ORIF Left distal fibula        8/15/2024   Pre-Op Questionnaire   Have you ever had a heart attack or stroke? No   Have you ever had surgery on your heart or blood vessels, such as a stent placement, a coronary artery bypass, or surgery on an artery in your head, neck, heart, or legs? No   Do you have chest pain with activity? No   Do you have a history of heart failure? No   Do you currently have a cold, bronchitis or symptoms of other infection? No   Do you have a cough, shortness of breath, or wheezing? No   Do you or anyone in your family have previous history of blood clots? No   Do you or does anyone in your family have a serious bleeding problem such as prolonged bleeding following surgeries or cuts? No   Have you ever had problems with anemia or been told to take iron pills? No   Have you had any abnormal blood loss such as black, tarry or bloody stools, or abnormal vaginal bleeding? No   Have you ever had a blood transfusion? No   Are you willing to have a blood transfusion if it is medically needed before, during, or after your surgery? Yes   Have you or any of your relatives ever had problems with anesthesia? No   Do you have sleep apnea, excessive snoring or daytime drowsiness? No   Do you have any artifical heart valves or other implanted medical devices like a pacemaker, defibrillator, or continuous glucose monitor? No   Do you have artificial joints? No   Are you allergic to latex? No        Health Care Directive  Patient does not have a Health Care Directive or Living Will: Discussed advance care planning with  patient; information given to patient to review.    Preoperative Review of    reviewed - controlled substances reflected in medication list.          Patient Active Problem List    Diagnosis Date Noted    Abdominal pain 08/17/2021     Priority: Medium     Formatting of this note might be different from the original.  Created by Conversion    Replacement Utility updated for latest IMO load      Acute sinusitis 08/17/2021     Priority: Medium     Formatting of this note might be different from the original.  Created by Conversion      Anxiety state 08/17/2021     Priority: Medium     Formatting of this note might be different from the original.  Created by Conversion    Replacement Utility updated for latest IMO load      Benign neoplasm of skin 08/17/2021     Priority: Medium     Formatting of this note might be different from the original.  Created by Conversion    Replacement Utility updated for latest IMO load      Bruxism 08/17/2021     Priority: Medium     Formatting of this note might be different from the original.  Created by Conversion      Disease of jaw 08/17/2021     Priority: Medium     Formatting of this note might be different from the original.  Created by Conversion    Replacement Utility updated for latest IMO load      Dysmenorrhea 08/17/2021     Priority: Medium     Formatting of this note might be different from the original.  Created by Conversion      Mittelschmerz 08/17/2021     Priority: Medium     Formatting of this note might be different from the original.  Created by Conversion      Other abnormal Papanicolaou smear of cervix and cervical HPV(795.09) 08/17/2021     Priority: Medium     Formatting of this note might be different from the original.  Created by Conversion      Palpitations 08/17/2021     Priority: Medium     Formatting of this note might be different from the original.  Created by Conversion      Sacroiliitis (H24) 08/17/2021     Priority: Medium     Formatting of this  note might be different from the original.  Created by Conversion      Syncope and collapse 2021     Priority: Medium     Formatting of this note might be different from the original.  Created by Conversion      Umbilical hernia 2021     Priority: Medium     Formatting of this note might be different from the original.  Created by Conversion    Replacement Utility updated for latest IMO load      Neck pain 2021     Priority: Medium    Shoulder pain, unspecified chronicity, unspecified laterality 2021     Priority: Medium    Atypical squamous cells of undetermined significance (ASCUS) on Papanicolaou smear of cervix 2019     Priority: Medium     11: Ascus pap with pos low risk HPV type 83. ECC benign (care everywhere)  10/22/12: Ascus pap with pos low risk HPV type 83. (care everywhere)  13: [place] B[facility] I. (care everywhere)  14: NIL pap, (pt reported)  03/10/15: NIL pap  19: NIL pap, Neg HR HPV result. Plan cotest in 3 years.   3/3/22 NIL pap, neg HPV      Family history of polycythemia vera 2016     Priority: Medium    Irritable bowel syndrome with diarrhea 2016     Priority: Medium    Major depressive disorder, single episode, mild (H24) 2015     Priority: Medium    Generalized anxiety disorder 10/20/2015     Priority: Medium     Diagnosis updated by automated process. Provider to review and confirm.      Pes anserine bursitis 2015     Priority: Medium    Degenerative arthritis of lumbar spine 2014     Priority: Medium    Chronic SI joint pain 2014     Priority: Medium    Obesity 2014     Priority: Medium     Problem list name updated by automated process. Provider to review        Past Medical History:   Diagnosis Date    Degenerative arthritis of lumbar spine     Depressive disorder     NO ACTIVE PROBLEMS      Past Surgical History:   Procedure Laterality Date     SECTION  03/19/10    [facility]  DELIVERY  "ONLY      Description:  Section;  Recorded: 2011;  Comments: 2010     Current Outpatient Medications   Medication Sig Dispense Refill    buPROPion (WELLBUTRIN XL) 300 MG 24 hr tablet TAKE 1 TABLET BY MOUTH EVERY MORNING 90 tablet 1    sertraline (ZOLOFT) 100 MG tablet Take 1.5 tablets (150 mg) by mouth daily 180 tablet 1    topiramate (TOPAMAX) 25 MG tablet Take 1 tablet (25 mg) by mouth 2 times daily (Patient not taking: Reported on 8/15/2024) 60 tablet 1       Allergies   Allergen Reactions    Amoxicillin      Hives.    Penicillins      Hives.        Social History     Tobacco Use    Smoking status: Never    Smokeless tobacco: Never   Substance Use Topics    Alcohol use: Yes     Alcohol/week: 2.0 standard drinks of alcohol       History   Drug Use No               Objective    /83   Pulse 86   Temp 97.3  F (36.3  C) (Temporal)   Resp 16   Ht 1.753 m (5' 9\")   Wt 87.5 kg (193 lb)   LMP 2024 (Exact Date)   SpO2 98%   BMI 28.50 kg/m     Estimated body mass index is 28.5 kg/m  as calculated from the following:    Height as of this encounter: 1.753 m (5' 9\").    Weight as of this encounter: 87.5 kg (193 lb).  Physical Exam  GENERAL: alert and no distress  EYES: Eyes grossly normal to inspection, PERRL and conjunctivae and sclerae normal  HENT: ear canals and TM's normal, nose and mouth without ulcers or lesions  NECK: no adenopathy, no asymmetry, masses, or scars  RESP: lungs clear to auscultation - no rales, rhonchi or wheezes  CV: regular rate and rhythm, normal S1 S2, no S3 or S4, no murmur, click or rub, no peripheral edema  ABDOMEN: soft, nontender, no hepatosplenomegaly, no masses and bowel sounds normal  MS: no gross musculoskeletal defects noted, no edema    No results for input(s): \"HGB\", \"PLT\", \"INR\", \"NA\", \"POTASSIUM\", \"CR\", \"A1C\" in the last 8760 hours.     Diagnostics  Labs pending at this time.  Results will be reviewed when available.   No EKG required, no history of " coronary heart disease, significant arrhythmia, peripheral arterial disease or other structural heart disease.    Revised Cardiac Risk Index (RCRI)  The patient has the following serious cardiovascular risks for perioperative complications:   - No serious cardiac risks = 0 points     RCRI Interpretation: 0 points: Class I (very low risk - 0.4% complication rate)         Signed Electronically by: Maya Wen DNP  A copy of this evaluation report is provided to the requesting physician.        Answers submitted by the patient for this visit:  Patient Health Questionnaire (Submitted on 8/15/2024)  If you checked off any problems, how difficult have these problems made it for you to do your work, take care of things at home, or get along with other people?: Not difficult at all  PHQ9 TOTAL SCORE: 2  DME (Durable Medical Equipment) Orders and Documentation  Orders Placed This Encounter   Procedures    Rolling Knee Walker/Scooter Order        The patient was assessed and it was determined the patient is in need of the following listed DME Supplies/Equipment. Please complete supporting documentation below to demonstrate medical necessity.

## 2024-08-15 NOTE — NURSING NOTE
Prior to immunization administration, verified patients identity using patient s name and date of birth. Please see Immunization Activity for additional information.     Screening Questionnaire for Adult Immunization    Are you sick today?   No   Do you have allergies to medications, food, a vaccine component or latex?   No   Have you ever had a serious reaction after receiving a vaccination?   No   Do you have a long-term health problem with heart, lung, kidney, or metabolic disease (e.g., diabetes), asthma, a blood disorder, no spleen, complement component deficiency, a cochlear implant, or a spinal fluid leak?  Are you on long-term aspirin therapy?   No   Do you have cancer, leukemia, HIV/AIDS, or any other immune system problem?   No   Do you have a parent, brother, or sister with an immune system problem?   No   In the past 3 months, have you taken medications that affect  your immune system, such as prednisone, other steroids, or anticancer drugs; drugs for the treatment of rheumatoid arthritis, Crohn s disease, or psoriasis; or have you had radiation treatments?   No   Have you had a seizure, or a brain or other nervous system problem?   No   During the past year, have you received a transfusion of blood or blood    products, or been given immune (gamma) globulin or antiviral drug?   No   For women: Are you pregnant or is there a chance you could become       pregnant during the next month?   No   Have you received any vaccinations in the past 4 weeks?   No     Immunization questionnaire answers were all negative.          Patient instructed to remain in clinic for 15 minutes afterwards, and to report any adverse reactions.     Screening performed by Trini Mendieta MA on 8/15/2024 at 1:27 PM.

## 2024-08-15 NOTE — LETTER
8/15/2024      Марина Whitehead  [address]  [place] [address]        Preoperative Evaluation  [facility]  [address]  [address]  [place] [address]  Phone: [phone]  Fax: [phone]  Primary Provider: Maya Wen DNP  Pre-op Performing Provider: Maya Wen DNP  Aug 15, 2024             8/15/2024   Surgical Information   What procedure is being done? fibula ankle surgery   Facility or Hospital where procedure/surgery will be performed[facility]   Who is doing the procedure / surgery? dr montemayor   Date of surgery / procedure: 70215   Time of surgery / procedure: morning   Where do you plan to recover after surgery? at home with family        Fax number for surgical facility: [phone]    Assessment & Plan    The proposed surgical procedure is considered INTERMEDIATE risk.    Preop general physical exam  Reviewed medical/social/family history and health maintenance   - CBC with platelets; Future  - Basic metabolic panel  (Ca, Cl, CO2, Creat, Gluc, K, Na, BUN); Future  - CBC with platelets  - Basic metabolic panel  (Ca, Cl, CO2, Creat, Gluc, K, Na, BUN)    Closed avulsion fracture of distal end of left fibula with routine healing, subsequent encounter  - Rolling Knee Walker/Scooter Order    Generalized anxiety disorder   Stable, tolerating med, no changes at this time    Major depressive disorder, single episode, mild (H24)   Stable, tolerating med, no changes at this time            - No identified additional risk factors other than previously addressed    Antiplatelet or Anticoagulation Medication Instructions   - Patient is on no antiplatelet or anticoagulation medications.    Additional Medication Instructions  Take all scheduled medications on the day of surgery    Recommendation  Approval given to proceed with proposed procedure, without further diagnostic evaluation.    Dotty Parish is a 44 year old, presenting for the  following:  Pre-Op Exam          8/15/2024    12:48 PM   Additional Questions   Roomed by Janna DIEZ         8/15/2024    12:48 PM   Patient Reported Additional Medications   Patient reports taking the following new medications Zepbound 5mg, stopped for surgery tho     HPI related to upcoming procedure: ORIF Left distal fibula        8/15/2024   Pre-Op Questionnaire   Have you ever had a heart attack or stroke? No   Have you ever had surgery on your heart or blood vessels, such as a stent placement, a coronary artery bypass, or surgery on an artery in your head, neck, heart, or legs? No   Do you have chest pain with activity? No   Do you have a history of heart failure? No   Do you currently have a cold, bronchitis or symptoms of other infection? No   Do you have a cough, shortness of breath, or wheezing? No   Do you or anyone in your family have previous history of blood clots? No   Do you or does anyone in your family have a serious bleeding problem such as prolonged bleeding following surgeries or cuts? No   Have you ever had problems with anemia or been told to take iron pills? No   Have you had any abnormal blood loss such as black, tarry or bloody stools, or abnormal vaginal bleeding? No   Have you ever had a blood transfusion? No   Are you willing to have a blood transfusion if it is medically needed before, during, or after your surgery? Yes   Have you or any of your relatives ever had problems with anesthesia? No   Do you have sleep apnea, excessive snoring or daytime drowsiness? No   Do you have any artifical heart valves or other implanted medical devices like a pacemaker, defibrillator, or continuous glucose monitor? No   Do you have artificial joints? No   Are you allergic to latex? No        Health Care Directive  Patient does not have a Health Care Directive or Living Will: Discussed advance care planning with patient; information given to patient to review.    Preoperative Review of    reviewed -  controlled substances reflected in medication list.          Patient Active Problem List    Diagnosis Date Noted     Abdominal pain 08/17/2021     Priority: Medium     Formatting of this note might be different from the original.  Created by Conversion    Replacement Utility updated for latest IMO load       Acute sinusitis 08/17/2021     Priority: Medium     Formatting of this note might be different from the original.  Created by Conversion       Anxiety state 08/17/2021     Priority: Medium     Formatting of this note might be different from the original.  Created by Conversion    Replacement Utility updated for latest IMO load       Benign neoplasm of skin 08/17/2021     Priority: Medium     Formatting of this note might be different from the original.  Created by Conversion    Replacement Utility updated for latest IMO load       Bruxism 08/17/2021     Priority: Medium     Formatting of this note might be different from the original.  Created by Conversion       Disease of jaw 08/17/2021     Priority: Medium     Formatting of this note might be different from the original.  Created by Conversion    Replacement Utility updated for latest IMO load       Dysmenorrhea 08/17/2021     Priority: Medium     Formatting of this note might be different from the original.  Created by Conversion       Mittelschmerz 08/17/2021     Priority: Medium     Formatting of this note might be different from the original.  Created by Conversion       Other abnormal Papanicolaou smear of cervix and cervical HPV(795.09) 08/17/2021     Priority: Medium     Formatting of this note might be different from the original.  Created by Conversion       Palpitations 08/17/2021     Priority: Medium     Formatting of this note might be different from the original.  Created by Conversion       Sacroiliitis (H24) 08/17/2021     Priority: Medium     Formatting of this note might be different from the original.  Created by Conversion       Syncope and  collapse 2021     Priority: Medium     Formatting of this note might be different from the original.  Created by Conversion       Umbilical hernia 2021     Priority: Medium     Formatting of this note might be different from the original.  Created by Conversion    Replacement Utility updated for latest IMO load       Neck pain 2021     Priority: Medium     Shoulder pain, unspecified chronicity, unspecified laterality 2021     Priority: Medium     Atypical squamous cells of undetermined significance (ASCUS) on Papanicolaou smear of cervix 2019     Priority: Medium     11: Ascus pap with pos low risk HPV type 83. ECC benign (care everywhere)  10/22/12: Ascus pap with pos low risk HPV type 83. (care everywhere)  13: [place] BART I. (care everywhere)  14: NIL pap, (pt reported)  03/10/15: NIL pap  19: NIL pap, Neg HR HPV result. Plan cotest in 3 years.   3/3/22 NIL pap, neg HPV       Family history of polycythemia vera 2016     Priority: Medium     Irritable bowel syndrome with diarrhea 2016     Priority: Medium     Major depressive disorder, single episode, mild (H24) 2015     Priority: Medium     Generalized anxiety disorder 10/20/2015     Priority: Medium     Diagnosis updated by automated process. Provider to review and confirm.       Pes anserine bursitis 2015     Priority: Medium     Degenerative arthritis of lumbar spine 2014     Priority: Medium     Chronic SI joint pain 2014     Priority: Medium     Obesity 2014     Priority: Medium     Problem list name updated by automated process. Provider to review        Past Medical History:   Diagnosis Date     Degenerative arthritis of lumbar spine      Depressive disorder 2004     NO ACTIVE PROBLEMS      Past Surgical History:   Procedure Laterality Date      SECTION  03/19/10     [facility]  DELIVERY ONLY      Description:  Section;  Recorded: 2011;   "Comments: 03/2010     Current Outpatient Medications   Medication Sig Dispense Refill     buPROPion (WELLBUTRIN XL) 300 MG 24 hr tablet TAKE 1 TABLET BY MOUTH EVERY MORNING 90 tablet 1     sertraline (ZOLOFT) 100 MG tablet Take 1.5 tablets (150 mg) by mouth daily 180 tablet 1     topiramate (TOPAMAX) 25 MG tablet Take 1 tablet (25 mg) by mouth 2 times daily (Patient not taking: Reported on 8/15/2024) 60 tablet 1       Allergies   Allergen Reactions     Amoxicillin      Hives.     Penicillins      Hives.        Social History     Tobacco Use     Smoking status: Never     Smokeless tobacco: Never   Substance Use Topics     Alcohol use: Yes     Alcohol/week: 2.0 standard drinks of alcohol       History   Drug Use No               Objective   /83   Pulse 86   Temp 97.3  F (36.3  C) (Temporal)   Resp 16   Ht 1.753 m (5' 9\")   Wt 87.5 kg (193 lb)   LMP 08/08/2024 (Exact Date)   SpO2 98%   BMI 28.50 kg/m     Estimated body mass index is 28.5 kg/m  as calculated from the following:    Height as of this encounter: 1.753 m (5' 9\").    Weight as of this encounter: 87.5 kg (193 lb).  Physical Exam  GENERAL: alert and no distress  EYES: Eyes grossly normal to inspection, PERRL and conjunctivae and sclerae normal  HENT: ear canals and TM's normal, nose and mouth without ulcers or lesions  NECK: no adenopathy, no asymmetry, masses, or scars  RESP: lungs clear to auscultation - no rales, rhonchi or wheezes  CV: regular rate and rhythm, normal S1 S2, no S3 or S4, no murmur, click or rub, no peripheral edema  ABDOMEN: soft, nontender, no hepatosplenomegaly, no masses and bowel sounds normal  MS: no gross musculoskeletal defects noted, no edema    No results for input(s): \"HGB\", \"PLT\", \"INR\", \"NA\", \"POTASSIUM\", \"CR\", \"A1C\" in the last 8760 hours.     Diagnostics  Labs pending at this time.  Results will be reviewed when available.   No EKG required, no history of coronary heart disease, significant arrhythmia, peripheral " arterial disease or other structural heart disease.    Revised Cardiac Risk Index (RCRI)  The patient has the following serious cardiovascular risks for perioperative complications:   - No serious cardiac risks = 0 points     RCRI Interpretation: 0 points: Class I (very low risk - 0.4% complication rate)         Signed Electronically by: Maya Wen DNP  A copy of this evaluation report is provided to the requesting physician.         Answers submitted by the patient for this visit:  Patient Health Questionnaire (Submitted on 8/15/2024)  If you checked off any problems, how difficult have these problems made it for you to do your work, take care of things at home, or get along with other people?: Not difficult at all  PHQ9 TOTAL SCORE: 2  DME (Durable Medical Equipment) Orders and Documentation  Orders Placed This Encounter   Procedures     Rolling Knee Walker/Scooter Order        The patient was assessed and it was determined the patient is in need of the following listed DME Supplies/Equipment. Please complete supporting documentation below to demonstrate medical necessity.             Sincerely,        Maya Wen DNP

## 2024-08-15 NOTE — PATIENT INSTRUCTIONS

## 2024-10-04 ENCOUNTER — THERAPY VISIT (OUTPATIENT)
Dept: PHYSICAL THERAPY | Facility: CLINIC | Age: 44
End: 2024-10-04
Payer: COMMERCIAL

## 2024-10-04 DIAGNOSIS — S82.832D CLOSED FRACTURE OF DISTAL END OF LEFT FIBULA WITH ROUTINE HEALING, UNSPECIFIED FRACTURE MORPHOLOGY, SUBSEQUENT ENCOUNTER: Primary | ICD-10-CM

## 2024-10-04 PROBLEM — S82.832A CLOSED FRACTURE OF DISTAL END OF LEFT FIBULA: Status: ACTIVE | Noted: 2024-10-04

## 2024-10-04 PROCEDURE — 97110 THERAPEUTIC EXERCISES: CPT | Mod: GP

## 2024-10-04 PROCEDURE — 97161 PT EVAL LOW COMPLEX 20 MIN: CPT | Mod: GP

## 2024-10-04 ASSESSMENT — ACTIVITIES OF DAILY LIVING (ADL)
MAKING_SHARP_TURNS_WHILE_RUNNING_FAST: EXTREME DIFFICULTY OR UNABLE TO PERFORM ACTIVITY
RUNNING_ON_UNEVEN_GROUND: EXTREME DIFFICULTY OR UNABLE TO PERFORM ACTIVITY
ROLLING_OVER_IN_BED: NO DIFFICULTY
LEFS_RAW_SCORE: 0
SHOPPING: EXTREME DIFFICULTY OR UNABLE TO PERFORM ACTIVITY
SITTING_FOR_1_HOUR: NO DIFFICULTY
ANY_OF_YOUR_USUAL_WORK,_HOUSEWORK_OR_SCHOOL_ACTIVITIES: QUITE A BIT OF DIFFICULTY
PLEASE_INDICATE_YOR_PRIMARY_REASON_FOR_REFERRAL_TO_THERAPY:: FOOT AND/OR ANKLE
PERFORMING_HEAVY_ACTIVITIES_AROUND_YOUR_HOME: EXTREME DIFFICULTY OR UNABLE TO PERFORM ACTIVITY
GETTING_INTO_OR_OUT_OF_A_CAR: A LITTLE BIT OF DIFFICULTY
YOUR_USUAL_HOBBIES,_RECREATIONAL_OR_SPORTING_ACTIVITIES: EXTREME DIFFICULTY OR UNABLE TO PERFORM ACTIVITY
WALKING_2_BLOCKS: QUITE A BIT OF DIFFICULTY
GETTING_INTO_AND_OUT_OF_A_BATH: MODERATE DIFFICULTY
PUTTING_ON_YOUR_SHOES_OR_SOCKS: A LITTLE BIT OF DIFFICULTY
LEFS_SCORE(%): 0
STANDING_FOR_1_HOUR: EXTREME DIFFICULTY OR UNABLE TO PERFORM ACTIVITY
LIFTING_AN_OBJECT,_LIKE_A_BAG_OF_GROCERIES_FROM_THE_FLOOR: QUITE A BIT OF DIFFICULTY
PERFORMING_LIGHT_ACTIVITIES_AROUND_YOUR_HOME: MODERATE DIFFICULTY
SQUATTING: EXTREME DIFFICULTY OR UNABLE TO PERFORM ACTIVITY
WALKING_A_MILE: EXTREME DIFFICULTY OR UNABLE TO PERFORM ACTIVITY
RUNNING_ON_EVEN_GROUND: EXTREME DIFFICULTY OR UNABLE TO PERFORM ACTIVITY
WALKING_BETWEEN_ROOMS: A LITTLE BIT OF DIFFICULTY
GOING_UP_OR_DOWN_10_STAIRS: QUITE A BIT OF DIFFICULTY

## 2024-10-04 NOTE — PROGRESS NOTES
PHYSICAL THERAPY EVALUATION  Type of Visit: Evaluation        Fall Risk Screen:  Fall screen completed by: PT  Have you fallen 2 or more times in the past year?: Yes  Have you fallen and had an injury in the past year?: Yes  Is patient a fall risk?: No    Subjective       Presenting condition or subjective complaint: broke fibula  Patient presents s/p ORIF of the left fibula. Her injury initially occurred on 8/9/2024 while wearing heels and rolling her ankle and falling. She is a month post op. She had her post op follow up this week and was cleared, she was in a boot until Wednesday when she cleared to starting weaning out of it. She was NWBing until 2-3 weeks ago.   Date of onset:      Relevant medical history:     Dates & types of surgery: early sept,repair of my fibula and ankle    Prior diagnostic imaging/testing results: X-ray     Prior therapy history for the same diagnosis, illness or injury: No      Prior Level of Function  Transfers: Independent  Ambulation: Independent  ADL: Independent  IADL:  Independent    Living Environment  Social support: With family members   Type of home: Multi-level   Stairs to enter the home: Yes       Ramp: No   Stairs inside the home: Yes 20 Is there a railing: Yes     Help at home: None  Equipment owned: Crutches     Employment: Yes it  Hobbies/Interests: walking weighifting ice skating, used to power lift and speed skate - wants to eventually get back to these.    Patient goals for therapy: lift weights, ice skate    Pain assessment: See objective evaluation for additional pain details     Objective   KEY FINDINGS:  Limited ankle ROM and strength  Impaired gait    FOOT/ANKLE EVALUATION  PAIN: Pain Level at Best: 2/10  Pain Level at Worst: 7/10  Pain Location: incision, R lateral ankle  Pain Quality: Aching, Dull, and Sharp  Other symptoms: numbness and tingling whole foot, stiffness, weakness, swelling  Pain Frequency: constant  Time Dependent?: no  Pain is Exacerbated By:  walking too quickly, certain movements can tweak it, stairs  Pain is Relieved By: rest, stretch, and elevation  Pain Progression: Improved overall though a little more achey since weaning out of boot  INTEGUMENTARY (edema, incisions):   Swelling noted L ankle, not notable into foot  POSTURE:   GAIT:   Weightbearing Status:   Assistive Device(s):   Gait Deviations:  decreased step length, decreased weight shift, decreased heel strike/push off on L, slowed gait speed  BALANCE/PROPRIOCEPTION: Single Leg Stance Eyes Open (seconds): unable to maintain SL stance on left without UE support (pain, АНДРЕЙ), WNL on R  WEIGHT BEARING ALIGNMENT: WNL  NON-WEIGHTBEARING ALIGNMENT:    ROM:   (Degrees) Left AROM Left PROM  Right AROM Right PROM   Ankle   Dorsiflexion  Neutral  12   CKC Ankle DF Knee to Wall Test       Ankle Plantarflexion  30  WNL   Ankle   Inversion  22  WNL   Ankle   Eversion  5  WNL   Great Toe   Flexion       Great Toe Extension       Pain:   End feel:     STRENGTH:   R ankle 5/5  L ankle did not formally assess d/t post op status, but able to activate each direction w/ mild discomfort  FLEXIBILITY:   Gastroc and soleus tightness  SPECIAL TESTS:   FUNCTIONAL TESTS:   PALPATION:  TTP lateral ankle  JOINT MOBILITY:       Assessment & Plan   CLINICAL IMPRESSIONS  Medical Diagnosis: s/p ORIF left fibula    Treatment Diagnosis: s/p ORIF left fibula   Impression/Assessment: Patient is a 44 year old female with L ankle complaints.  The following significant findings have been identified: Pain, Decreased ROM/flexibility, Decreased joint mobility, Decreased strength, Impaired balance, Decreased proprioception, Impaired sensation, Edema, Impaired gait, and Decreased activity tolerance. These impairments interfere with their ability to perform self care tasks, work tasks, recreational activities, household chores, driving , household mobility, and community mobility as compared to previous level of function.     Clinical  Decision Making (Complexity):  Clinical Presentation: Stable/Uncomplicated  Clinical Presentation Rationale: based on medical and personal factors listed in PT evaluation  Clinical Decision Making (Complexity): Low complexity    PLAN OF CARE  Treatment Interventions:  Modalities: Cryotherapy, Dry Needling  Interventions: Gait Training, Manual Therapy, Neuromuscular Re-education, Therapeutic Activity, Therapeutic Exercise, Self-Care/Home Management    Long Term Goals     PT Goal 1  Goal Identifier: Walking  Goal Description: Patient will be able to walk for at least 30 min w/o increase in pain, normal mechanics, no AD  Rationale: to maximize safety and independence with performance of ADLs and functional tasks;to maximize safety and independence within the home;to maximize safety and independence within the community  Target Date: 11/29/24      Frequency of Treatment: 1x/week  Duration of Treatment: 8 weeks    Recommended Referrals to Other Professionals:  none at this time  Education Assessment:   Learner/Method: Patient;Demonstration;Pictures/Video;No Barriers to Learning    Risks and benefits of evaluation/treatment have been explained.   Patient/Family/caregiver agrees with Plan of Care.     Evaluation Time:     PT Eval, Low Complexity Minutes (93134): 20     Signing Clinician: Breonna Kelly PT

## 2024-10-09 ENCOUNTER — TRANSCRIBE ORDERS (OUTPATIENT)
Dept: OTHER | Age: 44
End: 2024-10-09

## 2024-10-09 DIAGNOSIS — Z87.81 S/P ORIF (OPEN REDUCTION INTERNAL FIXATION) FRACTURE: Primary | ICD-10-CM

## 2024-10-09 DIAGNOSIS — Z98.890 S/P ORIF (OPEN REDUCTION INTERNAL FIXATION) FRACTURE: Primary | ICD-10-CM

## 2024-10-10 ENCOUNTER — E-VISIT (OUTPATIENT)
Dept: URGENT CARE | Facility: CLINIC | Age: 44
End: 2024-10-10
Payer: COMMERCIAL

## 2024-10-10 ENCOUNTER — MYC MEDICAL ADVICE (OUTPATIENT)
Dept: FAMILY MEDICINE | Facility: CLINIC | Age: 44
End: 2024-10-10

## 2024-10-10 DIAGNOSIS — R39.89 GENITAL SORE: Primary | ICD-10-CM

## 2024-10-10 PROCEDURE — 99207 PR NON-BILLABLE SERV PER CHARTING: CPT | Performed by: NURSE PRACTITIONER

## 2024-10-10 NOTE — PATIENT INSTRUCTIONS
Dear Марина Whitehead,    We are sorry you are not feeling well. Based on the responses you provided, it is recommended that you be seen in-person in urgent care so we can better evaluate your symptoms. Please click here to find the nearest urgent care location to you.   You will not be charged for this Visit. Thank you for trusting us with your care.    DAVIN Torres CNP

## 2024-10-10 NOTE — TELEPHONE ENCOUNTER
Writer replied to patient via [employer].  DAY ManzanaresN, RN (she/her)  [facility] RN

## 2024-11-12 ENCOUNTER — E-VISIT (OUTPATIENT)
Dept: FAMILY MEDICINE | Facility: CLINIC | Age: 44
End: 2024-11-12
Payer: COMMERCIAL

## 2024-11-12 DIAGNOSIS — A60.04 HERPES SIMPLEX VULVOVAGINITIS: Primary | ICD-10-CM

## 2024-11-12 RX ORDER — VALACYCLOVIR HYDROCHLORIDE 500 MG/1
500 TABLET, FILM COATED ORAL 2 TIMES DAILY
Qty: 6 TABLET | Refills: 0 | Status: SHIPPED | OUTPATIENT
Start: 2024-11-12 | End: 2024-11-15

## 2024-11-15 ENCOUNTER — VIRTUAL VISIT (OUTPATIENT)
Dept: FAMILY MEDICINE | Facility: CLINIC | Age: 44
End: 2024-11-15
Payer: COMMERCIAL

## 2024-11-15 DIAGNOSIS — A60.04 HERPES SIMPLEX VULVOVAGINITIS: ICD-10-CM

## 2024-11-15 DIAGNOSIS — N95.2 VAGINAL ATROPHY: Primary | ICD-10-CM

## 2024-11-15 RX ORDER — VALACYCLOVIR HYDROCHLORIDE 500 MG/1
500 TABLET, FILM COATED ORAL 2 TIMES DAILY
Qty: 6 TABLET | Refills: 3 | Status: SHIPPED | OUTPATIENT
Start: 2024-11-15

## 2024-11-15 RX ORDER — ESTRADIOL 0.1 MG/G
2 CREAM VAGINAL
Qty: 42.5 G | Refills: 4 | Status: SHIPPED | OUTPATIENT
Start: 2024-11-18

## 2024-11-15 NOTE — PROGRESS NOTES
"Марина is a 44 year old who is being evaluated via a billable video visit.    How would you like to obtain your AVS? MyChart  If the video visit is dropped, the invitation should be resent by: Text to cell phone: [phone]  Will anyone else be joining your video visit? No      Assessment & Plan     Herpes simplex vulvovaginitis  Isolated outbreak following recent treatment for BV.  Will send in refills to have on hand for any future outbreaks.  She has been struggling with recurrent yeast and bacterial vaginitis.  We are going to start estrace today.  If herpes outbreaks become more frequent, will consider suppressive therapy.    - valACYclovir (VALTREX) 500 MG tablet; Take 1 tablet (500 mg) by mouth 2 times daily.    Vaginal atrophy  See above.   - estradiol (ESTRACE) 0.1 MG/GM vaginal cream; Place 2 g vaginally twice a week.    The longitudinal plan of care for the diagnosis(es)/condition(s) as documented were addressed during this visit. Due to the added complexity in care, I will continue to support Марина in the subsequent management and with ongoing continuity of care.    BMI  Estimated body mass index is 28.5 kg/m  as calculated from the following:    Height as of 8/15/24: 1.753 m (5' 9\").    Weight as of 8/15/24: 87.5 kg (193 lb).             Subjective   Марина is a 44 year old, presenting for the following health issues:  Follow Up      11/15/2024    11:39 AM   Additional Questions   Roomed by Nery puri   Accompanied by self         11/15/2024    11:39 AM   Patient Reported Additional Medications   Patient reports taking the following new medications no     HPI     Went to [facility], was given valtrex for outbreak.  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  Has been struggling with recurrent yeast infections and had BV precipitation outbreak.  Symptoms are improved and outbreak has cleared.              Objective    Vitals - Patient Reported  Weight (Patient Reported): 82.1 kg (181 lb)  Height (Patient Reported): 175.3 cm (5' " "9\")  BMI (Based on Pt Reported Ht/Wt): 26.73  Pain Score: No Pain (0)        Physical Exam   GENERAL: alert and no distress  EYES: Eyes grossly normal to inspection.  No discharge or erythema, or obvious scleral/conjunctival abnormalities.  RESP: No audible wheeze, cough, or visible cyanosis.    SKIN: Visible skin clear. No significant rash, abnormal pigmentation or lesions.  NEURO: Cranial nerves grossly intact.  Mentation and speech appropriate for age.  PSYCH: Appropriate affect, tone, and pace of words          Video-Visit Details    Type of service:  Video Visit   Originating Location (pt. Location): Home    Distant Location (provider location):  Off-site  Platform used for Video Visit: Floridalma  Signed Electronically by: Maya Wen DNP    "

## 2024-12-03 ENCOUNTER — ANCILLARY PROCEDURE (OUTPATIENT)
Dept: MAMMOGRAPHY | Facility: CLINIC | Age: 44
End: 2024-12-03
Attending: NURSE PRACTITIONER
Payer: COMMERCIAL

## 2024-12-03 DIAGNOSIS — Z12.31 VISIT FOR SCREENING MAMMOGRAM: ICD-10-CM

## 2025-01-21 ENCOUNTER — ANCILLARY PROCEDURE (OUTPATIENT)
Dept: MAMMOGRAPHY | Facility: CLINIC | Age: 45
End: 2025-01-21
Attending: NURSE PRACTITIONER
Payer: COMMERCIAL

## 2025-01-21 DIAGNOSIS — Z12.31 VISIT FOR SCREENING MAMMOGRAM: ICD-10-CM

## 2025-01-23 ENCOUNTER — OFFICE VISIT (OUTPATIENT)
Dept: FAMILY MEDICINE | Facility: CLINIC | Age: 45
End: 2025-01-23
Payer: COMMERCIAL

## 2025-01-23 VITALS
WEIGHT: 186.1 LBS | HEIGHT: 69 IN | RESPIRATION RATE: 16 BRPM | TEMPERATURE: 97.9 F | OXYGEN SATURATION: 98 % | BODY MASS INDEX: 27.56 KG/M2 | SYSTOLIC BLOOD PRESSURE: 131 MMHG | DIASTOLIC BLOOD PRESSURE: 88 MMHG | HEART RATE: 70 BPM

## 2025-01-23 DIAGNOSIS — B37.31 YEAST INFECTION OF THE VAGINA: ICD-10-CM

## 2025-01-23 DIAGNOSIS — Z12.4 CERVICAL CANCER SCREENING: ICD-10-CM

## 2025-01-23 DIAGNOSIS — F33.41 RECURRENT MAJOR DEPRESSIVE DISORDER, IN PARTIAL REMISSION: ICD-10-CM

## 2025-01-23 DIAGNOSIS — R31.21 ASYMPTOMATIC MICROSCOPIC HEMATURIA: ICD-10-CM

## 2025-01-23 DIAGNOSIS — Z00.00 ROUTINE GENERAL MEDICAL EXAMINATION AT A HEALTH CARE FACILITY: Primary | ICD-10-CM

## 2025-01-23 DIAGNOSIS — F41.1 GENERALIZED ANXIETY DISORDER: ICD-10-CM

## 2025-01-23 DIAGNOSIS — E78.5 HYPERLIPIDEMIA LDL GOAL <100: ICD-10-CM

## 2025-01-23 DIAGNOSIS — Z11.3 SCREENING EXAMINATION FOR STI: ICD-10-CM

## 2025-01-23 LAB
ALBUMIN UR-MCNC: NEGATIVE MG/DL
APPEARANCE UR: CLEAR
BACTERIA #/AREA URNS HPF: ABNORMAL /HPF
BILIRUB UR QL STRIP: NEGATIVE
CHOLEST SERPL-MCNC: 254 MG/DL
CLUE CELLS: NORMAL
COLOR UR AUTO: YELLOW
FASTING STATUS PATIENT QL REPORTED: YES
GLUCOSE UR STRIP-MCNC: NEGATIVE MG/DL
HDLC SERPL-MCNC: 61 MG/DL
HGB UR QL STRIP: ABNORMAL
HIV 1+2 AB+HIV1 P24 AG SERPL QL IA: NONREACTIVE
KETONES UR STRIP-MCNC: NEGATIVE MG/DL
LDLC SERPL CALC-MCNC: 175 MG/DL
LEUKOCYTE ESTERASE UR QL STRIP: NEGATIVE
NITRATE UR QL: NEGATIVE
NONHDLC SERPL-MCNC: 193 MG/DL
PH UR STRIP: 7 [PH] (ref 5–7)
RBC #/AREA URNS AUTO: ABNORMAL /HPF
SP GR UR STRIP: 1.01 (ref 1–1.03)
T PALLIDUM AB SER QL: NONREACTIVE
TRICHOMONAS, WET PREP: NORMAL
TRIGL SERPL-MCNC: 90 MG/DL
UROBILINOGEN UR STRIP-ACNC: 0.2 E.U./DL
WBC #/AREA URNS AUTO: ABNORMAL /HPF
WBC'S/HIGH POWER FIELD, WET PREP: NORMAL
YEAST, WET PREP: NORMAL

## 2025-01-23 RX ORDER — SERTRALINE HYDROCHLORIDE 100 MG/1
150 TABLET, FILM COATED ORAL DAILY
Qty: 180 TABLET | Refills: 3 | Status: SHIPPED | OUTPATIENT
Start: 2025-01-23

## 2025-01-23 RX ORDER — BUPROPION HYDROCHLORIDE 300 MG/1
300 TABLET ORAL EVERY MORNING
Qty: 90 TABLET | Refills: 3 | Status: SHIPPED | OUTPATIENT
Start: 2025-01-23

## 2025-01-23 SDOH — HEALTH STABILITY: PHYSICAL HEALTH: ON AVERAGE, HOW MANY DAYS PER WEEK DO YOU ENGAGE IN MODERATE TO STRENUOUS EXERCISE (LIKE A BRISK WALK)?: 3 DAYS

## 2025-01-23 ASSESSMENT — PATIENT HEALTH QUESTIONNAIRE - PHQ9
10. IF YOU CHECKED OFF ANY PROBLEMS, HOW DIFFICULT HAVE THESE PROBLEMS MADE IT FOR YOU TO DO YOUR WORK, TAKE CARE OF THINGS AT HOME, OR GET ALONG WITH OTHER PEOPLE: SOMEWHAT DIFFICULT
SUM OF ALL RESPONSES TO PHQ QUESTIONS 1-9: 4
SUM OF ALL RESPONSES TO PHQ QUESTIONS 1-9: 4

## 2025-01-23 ASSESSMENT — PAIN SCALES - GENERAL: PAINLEVEL_OUTOF10: NO PAIN (0)

## 2025-01-23 ASSESSMENT — SOCIAL DETERMINANTS OF HEALTH (SDOH): HOW OFTEN DO YOU GET TOGETHER WITH FRIENDS OR RELATIVES?: MORE THAN THREE TIMES A WEEK

## 2025-01-23 NOTE — PROGRESS NOTES
Preventive Care Visit  [facility]  Maya Wen DNP, Nurse Practitioner Primary Care  Jan 23, 2025      Assessment & Plan     Routine general medical examination at a health care facility  Reviewed medical/social/family history and health maintenance     Yeast infection of the vagina  History of recurrent infections.  Has not started estrace yet.  Wet prep negative today.  Could also be secondary to GLP-1. Would consider prophylactic treatment with antibiotic use as this is a trigger. Will continue to monitor.      Hyperlipidemia LDL goal <100  Low ASCVD risk, will continue to monitor.    - Lipid panel reflex to direct LDL Fasting; Future  - HIV Antigen Antibody Combo; Future  - Lipid panel reflex to direct LDL Fasting  - HIV Antigen Antibody Combo    Generalized anxiety disorder   Stable, tolerating med, no changes at this time   - buPROPion (WELLBUTRIN XL) 300 MG 24 hr tablet; Take 1 tablet (300 mg) by mouth every morning.    Recurrent major depressive disorder, in partial remission   Stable, tolerating med, no changes at this time  - buPROPion (WELLBUTRIN XL) 300 MG 24 hr tablet; Take 1 tablet (300 mg) by mouth every morning.  - sertraline (ZOLOFT) 100 MG tablet; Take 1.5 tablets (150 mg) by mouth daily.  Asymptomatic microscopic hematuria  - UA Macroscopic with reflex to Microscopic and Culture - Lab Collect  - UA Microscopic with Reflex to Culture    Cervical cancer screening  - HPV and Gynecologic Cytology Panel - Recommended Age 30 - 65 Years    Screening examination for STI  - NEISSERIA GONORRHOEA PCR; Future  - CHLAMYDIA TRACHOMATIS PCR; Future  - Wet prep - lab collect; Future  - Treponema Abs w Reflex to RPR and Titer; Future  - Wet prep - lab collect  - NEISSERIA GONORRHOEA PCR  - CHLAMYDIA TRACHOMATIS PCR  - Treponema Abs w Reflex to RPR and Titer    Patient has been advised of split billing requirements and indicates understanding: Yes      The longitudinal plan of care  for the diagnosis(es)/condition(s) as documented were addressed during this visit. Due to the added complexity in care, I will continue to support Марина in the subsequent management and with ongoing continuity of care.    Counseling  Appropriate preventive services were addressed with this patient via screening, questionnaire, or discussion as appropriate for fall prevention, nutrition, physical activity, Tobacco-use cessation, social engagement, weight loss and cognition.  Checklist reviewing preventive services available has been given to the patient.  Reviewed patient's diet, addressing concerns and/or questions.   She is at risk for lack of exercise and has been provided with information to increase physical activity for the benefit of her well-being.   She is at risk for psychosocial distress and has been provided with information to reduce risk.   The patient reports drinking more than 3 alcoholic drinks per day and/or more than 7 drhnks per week. The patient was counseled and given information about possible harmful effects of excessive alcohol intake.        Dotty Parish is a 45 year old, presenting for the following:  Physical (STD screening./Colonoscopy.)        1/23/2025     7:43 AM   Additional Questions   Roomed by Janna DIEZ          HPI    Doing well overall.  Did not start estrace.  Recent yeast infection secondary to antibiotics  Continues on zepbound, going well.    Mood is good in general.        Health Care Directive  Patient does not have a Health Care Directive: Discussed advance care planning with patient; information given to patient to review.      1/23/2025   General Health   How would you rate your overall physical health? Good   Feel stress (tense, anxious, or unable to sleep) To some extent   (!) STRESS CONCERN      1/23/2025   Nutrition   Three or more servings of calcium each day? Yes   Diet: Regular (no restrictions)   How many servings of fruit and vegetables per day? (!) 2-3    How many sweetened beverages each day? 0-1         1/23/2025   Exercise   Days per week of moderate/strenous exercise 3 days         1/23/2025   Social Factors   Frequency of gathering with friends or relatives More than three times a week   Worry food won't last until get money to buy more No   Food not last or not have enough money for food? No   Do you have housing? (Housing is defined as stable permanent housing and does not include staying ouside in a car, in a tent, in an abandoned building, in an overnight shelter, or couch-surfing.) Yes   Are you worried about losing your housing? No   Lack of transportation? No   Unable to get utilities (heat,electricity)? No         1/23/2025   Dental   Dentist two times every year? Yes         1/23/2025   TB Screening   Were you born outside of the [place]? No       Today's PHQ-9 Score:       1/23/2025     7:19 AM   PHQ-9 SCORE   PHQ-9 Total Score MyChart 4 (Minimal depression)   PHQ-9 Total Score 4        Patient-reported         1/23/2025   Substance Use   Alcohol more than 3/day or more than 7/wk Yes   How often do you have a drink containing alcohol 2 to 3 times a week   How many alcohol drinks on typical day 3 or 4   How often do you have 5+ drinks at one occasion Never   Audit 2/3 Score 1   How often not able to stop drinking once started Never   How often failed to do what normally expected Never   How often needed first drink in am after a heavy drinking session Never   How often feeling of guilt or remorse after drinking Never   How often unable to remember what happened the night before Never   Have you or someone else been injured because of your drinking No   Has anyone been concerned or suggested you cut down on drinking No   TOTAL SCORE - AUDIT 4   Do you use any other substances recreationally? (!) CANNABIS PRODUCTS     Social History     Tobacco Use    Smoking status: Never    Smokeless tobacco: Never   Vaping Use    Vaping status: Never Used   Substance Use  "Topics    Alcohol use: Yes     Alcohol/week: 2.0 standard drinks of alcohol    Drug use: No           10/11/2023   LAST FHS-7 RESULTS   1st degree relative breast or ovarian cancer No   Any relative bilateral breast cancer No   Any male have breast cancer No   Any ONE woman have BOTH breast AND ovarian cancer No   Any woman with breast cancer before 50yrs No   2 or more relatives with breast AND/OR ovarian cancer No   2 or more relatives with breast AND/OR bowel cancer Yes        Mammogram Screening - Mammogram every 1-2 years updated in Health Maintenance based on mutual decision making        1/23/2025   STI Screening   New sexual partner(s) since last STI/HIV test? (!) YES      History of abnormal Pap smear: No - age 30- 64 PAP with HPV every 5 years recommended        Latest Ref Rng & Units 3/3/2022     2:15 PM 3/12/2019     7:45 AM 3/12/2019     7:40 AM   PAP / HPV   PAP  Negative for Intraepithelial Lesion or Malignancy (NILM)      PAP (Historical)   NIL     HPV 16 DNA Negative Negative   Negative    HPV 18 DNA Negative Negative   Negative    Other HR HPV Negative Negative   Negative      ASCVD Risk   The 10-year ASCVD risk score (Olegario KATHLEEN, et al., 2019) is: 1.1%    Values used to calculate the score:      Age: 45 years      Sex: Female      Is Non- : No      Diabetic: No      Tobacco smoker: No      Systolic Blood Pressure: 131 mmHg      Is BP treated: No      HDL Cholesterol: 60 mg/dL      Total Cholesterol: 251 mg/dL        1/23/2025   Contraception/Family Planning   Questions about contraception or family planning No        Reviewed and updated as needed this visit by Provider                             Objective    Exam  /88   Pulse 70   Temp 97.9  F (36.6  C) (Temporal)   Resp 16   Ht 1.74 m (5' 8.5\")   Wt 84.4 kg (186 lb 1.6 oz)   LMP 01/09/2025 (Approximate)   SpO2 98%   BMI 27.88 kg/m     Estimated body mass index is 27.88 kg/m  as calculated from the " "following:    Height as of this encounter: 1.74 m (5' 8.5\").    Weight as of this encounter: 84.4 kg (186 lb 1.6 oz).    Physical Exam  GENERAL: alert and no distress  EYES: Eyes grossly normal to inspection, PERRL and conjunctivae and sclerae normal  HENT: ear canals and TM's normal, nose and mouth without ulcers or lesions  NECK: no adenopathy, no asymmetry, masses, or scars  RESP: lungs clear to auscultation - no rales, rhonchi or wheezes  BREAST: normal without masses, tenderness or nipple discharge and no palpable axillary masses or adenopathy  CV: regular rate and rhythm, normal S1 S2, no S3 or S4, no murmur, click or rub, no peripheral edema  ABDOMEN: soft, nontender, no hepatosplenomegaly, no masses and bowel sounds normal   (female) w/bimanual: normal female external genitalia, normal urethral meatus, normal vaginal mucosa, and normal cervix/adnexa/uterus without masses or discharge  MS: no gross musculoskeletal defects noted, no edema  SKIN: no suspicious lesions or rashes  NEURO: Normal strength and tone, mentation intact and speech normal  PSYCH: mentation appears normal, affect normal/bright        Signed Electronically by: Maya Wen DNP    Answers submitted by the patient for this visit:  Patient Health Questionnaire (Submitted on 1/23/2025)  If you checked off any problems, how difficult have these problems made it for you to do your work, take care of things at home, or get along with other people?: Somewhat difficult  PHQ9 TOTAL SCORE: 4    "

## 2025-01-23 NOTE — NURSING NOTE
Prior to immunization administration, verified patients identity using patient s name and date of birth. Please see Immunization Activity for additional information.     Screening Questionnaire for Adult Immunization    Are you sick today?   No   Do you have allergies to medications, food, a vaccine component or latex?   Yes   Have you ever had a serious reaction after receiving a vaccination?   No   Do you have a long-term health problem with heart, lung, kidney, or metabolic disease (e.g., diabetes), asthma, a blood disorder, no spleen, complement component deficiency, a cochlear implant, or a spinal fluid leak?  Are you on long-term aspirin therapy?   No   Do you have cancer, leukemia, HIV/AIDS, or any other immune system problem?   No   Do you have a parent, brother, or sister with an immune system problem?   No   In the past 3 months, have you taken medications that affect  your immune system, such as prednisone, other steroids, or anticancer drugs; drugs for the treatment of rheumatoid arthritis, Crohn s disease, or psoriasis; or have you had radiation treatments?   No   Have you had a seizure, or a brain or other nervous system problem?   No   During the past year, have you received a transfusion of blood or blood    products, or been given immune (gamma) globulin or antiviral drug?   No   For women: Are you pregnant or is there a chance you could become       pregnant during the next month?   No   Have you received any vaccinations in the past 4 weeks?   No     Immunization questionnaire was positive for at least one answer.  Notified Maya Wen DNP.      Patient instructed to remain in clinic for 15 minutes afterwards, and to report any adverse reactions.     Screening performed by Nery Stevenson MA on 1/23/2025 at 8:21 AM.

## 2025-01-23 NOTE — PATIENT INSTRUCTIONS
Patient Education   Preventive Care Advice   This is general advice given by our system to help you stay healthy. However, your care team may have specific advice just for you. Please talk to your care team about your preventive care needs.  Nutrition  Eat 5 or more servings of fruits and vegetables each day.  Try wheat bread, brown rice and whole grain pasta (instead of white bread, rice, and pasta).  Get enough calcium and vitamin D. Check the label on foods and aim for 100% of the RDA (recommended daily allowance).  Lifestyle  Exercise at least 150 minutes each week  (30 minutes a day, 5 days a week).  Do muscle strengthening activities 2 days a week. These help control your weight and prevent disease.  No smoking.  Wear sunscreen to prevent skin cancer.  Have a dental exam and cleaning every 6 months.  Yearly exams  See your health care team every year to talk about:  Any changes in your health.  Any medicines your care team has prescribed.  Preventive care, family planning, and ways to prevent chronic diseases.  Shots (vaccines)   HPV shots (up to age 26), if you've never had them before.  Hepatitis B shots (up to age 59), if you've never had them before.  COVID-19 shot: Get this shot when it's due.  Flu shot: Get a flu shot every year.  Tetanus shot: Get a tetanus shot every 10 years.  Pneumococcal, hepatitis A, and RSV shots: Ask your care team if you need these based on your risk.  Shingles shot (for age 50 and up)  General health tests  Diabetes screening:  Starting at age 35, Get screened for diabetes at least every 3 years.  If you are younger than age 35, ask your care team if you should be screened for diabetes.  Cholesterol test: At age 39, start having a cholesterol test every 5 years, or more often if advised.  Bone density scan (DEXA): At age 50, ask your care team if you should have this scan for osteoporosis (brittle bones).  Hepatitis C: Get tested at least once in your life.  STIs (sexually  transmitted infections)  Before age 24: Ask your care team if you should be screened for STIs.  After age 24: Get screened for STIs if you're at risk. You are at risk for STIs (including HIV) if:  You are sexually active with more than one person.  You don't use condoms every time.  You or a partner was diagnosed with a sexually transmitted infection.  If you are at risk for HIV, ask about PrEP medicine to prevent HIV.  Get tested for HIV at least once in your life, whether you are at risk for HIV or not.  Cancer screening tests  Cervical cancer screening: If you have a cervix, begin getting regular cervical cancer screening tests starting at age 21.  Breast cancer scan (mammogram): If you've ever had breasts, begin having regular mammograms starting at age 40. This is a scan to check for breast cancer.  Colon cancer screening: It is important to start screening for colon cancer at age 45.  Have a colonoscopy test every 10 years (or more often if you're at risk) Or, ask your provider about stool tests like a FIT test every year or Cologuard test every 3 years.  To learn more about your testing options, visit:   .  For help making a decision, visit:   https://bit.ly/lp22121.  Prostate cancer screening test: If you have a prostate, ask your care team if a prostate cancer screening test (PSA) at age 55 is right for you.  Lung cancer screening: If you are a current or former smoker ages 50 to 80, ask your care team if ongoing lung cancer screenings are right for you.  For informational purposes only. Not to replace the advice of your health care provider. Copyright   2023 [facility]. All rights reserved. Clinically reviewed by the [facility]. [employer] 515107 - REV 01/24.  Learning About Stress  What is stress?     Stress is your body's response to a hard situation. Your body can have a physical, emotional, or mental response. Stress is a fact of life for most people, and it  affects everyone differently. What causes stress for you may not be stressful for someone else.  A lot of things can cause stress. You may feel stress when you go on a job interview, take a test, or run a race. This kind of short-term stress is normal and even useful. It can help you if you need to work hard or react quickly. For example, stress can help you finish an important job on time.  Long-term stress is caused by ongoing stressful situations or events. Examples of long-term stress include long-term health problems, ongoing problems at work, or conflicts in your family. Long-term stress can harm your health.  How does stress affect your health?  When you are stressed, your body responds as though you are in danger. It makes hormones that speed up your heart, make you breathe faster, and give you a burst of energy. This is called the fight-or-flight stress response. If the stress is over quickly, your body goes back to normal and no harm is done.  But if stress happens too often or lasts too long, it can have bad effects. Long-term stress can make you more likely to get sick, and it can make symptoms of some diseases worse. If you tense up when you are stressed, you may develop neck, shoulder, or low back pain. Stress is linked to high blood pressure and heart disease.  Stress also harms your emotional health. It can make you ayala, tense, or depressed. Your relationships may suffer, and you may not do well at work or school.  What can you do to manage stress?  You can try these things to help manage stress:   Do something active. Exercise or activity can help reduce stress. Walking is a great way to get started. Even everyday activities such as housecleaning or yard work can help.  Try yoga or blanca chi. These techniques combine exercise and meditation. You may need some training at first to learn them.  Do something you enjoy. For example, listen to music or go to a movie. Practice your hobby or do volunteer  "work.  Meditate. This can help you relax, because you are not worrying about what happened before or what may happen in the future.  Do guided imagery. Imagine yourself in any setting that helps you feel calm. You can use online videos, books, or a teacher to guide you.  Do breathing exercises. For example:  From a standing position, bend forward from the waist with your knees slightly bent. Let your arms dangle close to the floor.  Breathe in slowly and deeply as you return to a standing position. Roll up slowly and lift your head last.  Hold your breath for just a few seconds in the standing position.  Breathe out slowly and bend forward from the waist.  Let your feelings out. Talk, laugh, cry, and express anger when you need to. Talking with supportive friends or family, a counselor, or a riky leader about your feelings is a healthy way to relieve stress. Avoid discussing your feelings with people who make you feel worse.  Write. It may help to write about things that are bothering you. This helps you find out how much stress you feel and what is causing it. When you know this, you can find better ways to cope.  What can you do to prevent stress?  You might try some of these things to help prevent stress:  Manage your time. This helps you find time to do the things you want and need to do.  Get enough sleep. Your body recovers from the stresses of the day while you are sleeping.  Get support. Your family, friends, and community can make a difference in how you experience stress.  Limit your news feed. Avoid or limit time on social media or news that may make you feel stressed.  Do something active. Exercise or activity can help reduce stress. Walking is a great way to get started.  Where can you learn more?  Go to https://www.qcue.net/patiented  Enter N032 in the search box to learn more about \"Learning About Stress.\"  Current as of: October 24, 2023  Content Version: 14.3    2024 [employer]. " "  Care instructions adapted under license by your healthcare professional. If you have questions about a medical condition or this instruction, always ask your healthcare professional. [employer] disclaims any warranty or liability for your use of this information.    9 Ways to Cut Back on Drinking  Maybe you've found yourself drinking more alcohol than you'd prefer. If you want to cut back, here are some ideas to try.    Think before you drink.  Do you really want a drink, or is it just a habit? If you're used to having a drink at a certain time, try doing something else then.     Look for substitutes.  Find some no-alcohol drinks that you enjoy, like flavored seltzer water, tea with honey, or tonic with a slice of lime. Or try alcohol-free beer or \"virgin\" cocktails (without the alcohol).     Drink more water.  Use water to quench your thirst. Drink a glass of water before you have any alcohol. Have another glass along with every drink or between drinks.     Shrink your drink.  For example, have a bottle of beer instead of a pint. Use a smaller glass for wine. Choose drinks with lower alcohol content (ABV%). Or use less liquor and more mixer in cocktails.     Slow down.  It's easy to drink quickly and without thinking about it. Pay attention, and make each drink last longer.     Do the math.  Total up how much you spend on alcohol each month. How much is that a year? If you cut back, what could you do with the money you save?     Take a break.  Choose a day or two each week when you won't drink at all. Notice how you feel on those days, physically and emotionally. How did you sleep? Do you feel better? Over time, add more break days.     Count calories.  Would you like to lose some weight? For some people that's a good motivator for cutting back. Figure out how many calories are in each drink. How many does that add up to in a day? In a week? In a month?     Practice saying no.  Be ready when someone " "offers you a drink. Try: \"Thanks, I've had enough.\" Or \"Thanks, but I'm cutting back.\" Or \"No, thanks. I feel better when I drink less.\"   Current as of: November 15, 2023  Content Version: 14.3    2024 [employer].   Care instructions adapted under license by your healthcare professional. If you have questions about a medical condition or this instruction, always ask your healthcare professional. [employer] disclaims any warranty or liability for your use of this information.  Substance Use Disorder: Care Instructions  Overview     You can improve your life and health by stopping your use of alcohol or drugs. When you don't drink or use drugs, you may feel and sleep better. You may get along better with your family, friends, and coworkers. There are medicines and programs that can help with substance use disorder.  How can you care for yourself at home?  Here are some ways to help you stay sober and prevent relapse.  If you have been given medicine to help keep you sober or reduce your cravings, be sure to take it exactly as prescribed.  Talk to your doctor about programs that can help you stop using drugs or drinking alcohol.  Do not keep alcohol or drugs in your home.  Plan ahead. Think about what you'll say if other people ask you to drink or use drugs. Try not to spend time with people who drink or use drugs.  Use the time and money spent on drinking or drugs to do something that's important to you.  Preventing a relapse  Have a plan to deal with relapse. Learn to recognize changes in your thinking that lead you to drink or use drugs. Get help before you start to drink or use drugs again.  Try to stay away from situations, friends, or places that may lead you to drink or use drugs.  If you feel the need to drink alcohol or use drugs again, seek help right away. Call a trusted friend or family member. Some people get support from organizations such as Narcotics Anonymous or Jaco Solarsi " or from treatment facilities.  If you relapse, get help as soon as you can. Some people make a plan with another person that outlines what they want that person to do for them if they relapse. The plan usually includes how to handle the relapse and who to notify in case of relapse.  Don't give up. Remember that a relapse doesn't mean that you have failed. Use the experience to learn the triggers that lead you to drink or use drugs. Then quit again. Recovery is a lifelong process. Many people have several relapses before they are able to quit for good.  Follow-up care is a key part of your treatment and safety. Be sure to make and go to all appointments, and call your doctor if you are having problems. It's also a good idea to know your test results and keep a list of the medicines you take.  When should you call for help?   Call 561  anytime you think you may need emergency care. For example, call if you or someone else:    Has overdosed or has withdrawal signs. Be sure to tell the emergency workers that you are or someone else is using or trying to quit using drugs. Overdose or withdrawal signs may include:  Losing consciousness.  Seizure.  Seeing or hearing things that aren't there (hallucinations).     Is thinking or talking about suicide or harming others.   Where to get help 24 hours a day, 7 days a week   If you or someone you know talks about suicide, self-harm, a mental health crisis, a substance use crisis, or any other kind of emotional distress, get help right away. You can:    Call the Suicide and Crisis Lifeline at 988.     Call 2-781-626-TALK (1-304.640.9737).     Text HOME to 855119 to access the Crisis Text Line.   Consider saving these numbers in your phone.  Go to [email or web address] for more information or to chat online.  Call your doctor now or seek immediate medical care if:    You are having withdrawal symptoms. These may include nausea or vomiting, sweating, shakiness, and anxiety.   Watch  "closely for changes in your health, and be sure to contact your doctor if:    You have a relapse.     You need more help or support to stop.   Where can you learn more?  Go to https://www.Entrecard.net/patiented  Enter H573 in the search box to learn more about \"Substance Use Disorder: Care Instructions.\"  Current as of: November 15, 2023  Content Version: 14.3    2024 [employer].   Care instructions adapted under license by your healthcare professional. If you have questions about a medical condition or this instruction, always ask your healthcare professional. [employer] disclaims any warranty or liability for your use of this information.    Safer Sex: Care Instructions  Overview  Safer sex is a way to reduce your risk of getting a sexually transmitted infection (STI). It can also help prevent pregnancy.  Several products can help you practice safer sex and reduce your chance of STIs. One of the best is a condom. There are internal and external condoms. You can use a special rubber sheet (dental dam) for protection during oral sex. Disposable gloves can keep your hands from touching blood, semen, or other body fluids that can carry infections.  Remember that birth control methods such as diaphragms, IUDs, foams, and birth control pills do not stop you from getting STIs.  Follow-up care is a key part of your treatment and safety. Be sure to make and go to all appointments, and call your doctor if you are having problems. It's also a good idea to know your test results and keep a list of the medicines you take.  How can you care for yourself at home?  Think about getting vaccinated to help prevent hepatitis A, hepatitis B, and human papillomavirus (HPV). They can be spread through sex.  Use a condom every time you have sex. Use an external condom, which goes on the penis. Or use an internal condom, which goes into the vagina or anus.  Make sure you use the right size external condom. A " "condom that's too small can break easily. A condom that's too big can slip off during sex.  Use a new condom each time you have sex. Be careful not to poke a hole in the condom when you open the wrapper.  Don't use an internal condom and an external condom at the same time.  Never use petroleum jelly (such as Vaseline), grease, hand lotion, baby oil, or anything with oil in it. These products can make holes in the condom.  After intercourse, hold the edge of the condom as you remove it. This will help keep semen from spilling out of the condom.  Do not have sex with anyone who has symptoms of an STI, such as sores on the genitals or mouth.  Do not drink a lot of alcohol or use drugs before sex.  Limit your sex partners. Sex with one partner who has sex only with you can reduce your risk of getting an STI.  Don't share sex toys. But if you do share them, use a condom and clean the sex toys between each use.  Talk to any partners before you have sex. Talk about what you feel comfortable with and whether you have any boundaries with sex. And find out if your partner or partners may be at risk for any STI. Keep in mind that a person may be able to spread an STI even if they do not have symptoms. You and any partners may want to get tested for STIs.  Where can you learn more?  Go to https://www.Edufii.net/patiented  Enter B608 in the search box to learn more about \"Safer Sex: Care Instructions.\"  Current as of: April 30, 2024  Content Version: 14.3    2024 [employer].   Care instructions adapted under license by your healthcare professional. If you have questions about a medical condition or this instruction, always ask your healthcare professional. [employer] disclaims any warranty or liability for your use of this information.       "

## 2025-01-28 LAB
HPV HR 12 DNA CVX QL NAA+PROBE: POSITIVE
HPV16 DNA CVX QL NAA+PROBE: NEGATIVE
HPV18 DNA CVX QL NAA+PROBE: NEGATIVE
HUMAN PAPILLOMA VIRUS FINAL DIAGNOSIS: ABNORMAL

## 2025-01-29 LAB
BKR AP ASSOCIATED HPV REPORT: NORMAL
BKR LAB AP GYN ADEQUACY: NORMAL
BKR LAB AP GYN INTERPRETATION: NORMAL
BKR LAB AP PREVIOUS ABNORMAL: NORMAL
PATH REPORT.COMMENTS IMP SPEC: NORMAL
PATH REPORT.COMMENTS IMP SPEC: NORMAL
PATH REPORT.RELEVANT HX SPEC: NORMAL

## 2025-01-30 ENCOUNTER — PATIENT OUTREACH (OUTPATIENT)
Dept: FAMILY MEDICINE | Facility: CLINIC | Age: 45
End: 2025-01-30
Payer: COMMERCIAL

## 2025-01-30 PROBLEM — R87.610 ATYPICAL SQUAMOUS CELLS OF UNDETERMINED SIGNIFICANCE (ASCUS) ON PAPANICOLAOU SMEAR OF CERVIX: Status: ACTIVE | Noted: 2019-03-19

## 2025-02-07 ENCOUNTER — ANCILLARY PROCEDURE (OUTPATIENT)
Dept: MAMMOGRAPHY | Facility: CLINIC | Age: 45
End: 2025-02-07
Attending: NURSE PRACTITIONER
Payer: COMMERCIAL

## 2025-02-07 DIAGNOSIS — Z12.31 VISIT FOR SCREENING MAMMOGRAM: ICD-10-CM

## 2025-02-07 PROCEDURE — 77063 BREAST TOMOSYNTHESIS BI: CPT | Mod: TC | Performed by: RADIOLOGY

## 2025-02-07 PROCEDURE — 77067 SCR MAMMO BI INCL CAD: CPT | Mod: TC | Performed by: RADIOLOGY

## 2025-03-17 ENCOUNTER — MYC MEDICAL ADVICE (OUTPATIENT)
Dept: FAMILY MEDICINE | Facility: CLINIC | Age: 45
End: 2025-03-17
Payer: COMMERCIAL

## 2025-04-08 ENCOUNTER — E-VISIT (OUTPATIENT)
Dept: FAMILY MEDICINE | Facility: CLINIC | Age: 45
End: 2025-04-08
Payer: COMMERCIAL

## 2025-04-08 DIAGNOSIS — L30.9 DERMATITIS: Primary | ICD-10-CM

## 2025-05-05 DIAGNOSIS — L30.9 DERMATITIS: ICD-10-CM

## 2025-05-06 RX ORDER — CLINDAMYCIN PHOSPHATE 10 MG/G
GEL TOPICAL 2 TIMES DAILY
Qty: 90 G | Refills: 4 | Status: SHIPPED | OUTPATIENT
Start: 2025-05-06